# Patient Record
Sex: MALE | Race: WHITE | Employment: OTHER | URBAN - METROPOLITAN AREA
[De-identification: names, ages, dates, MRNs, and addresses within clinical notes are randomized per-mention and may not be internally consistent; named-entity substitution may affect disease eponyms.]

---

## 2017-01-17 ENCOUNTER — OFFICE VISIT (OUTPATIENT)
Dept: INTERNAL MEDICINE CLINIC | Facility: CLINIC | Age: 62
End: 2017-01-17

## 2017-01-17 VITALS
DIASTOLIC BLOOD PRESSURE: 103 MMHG | SYSTOLIC BLOOD PRESSURE: 173 MMHG | WEIGHT: 315 LBS | HEIGHT: 69 IN | TEMPERATURE: 98 F | HEART RATE: 99 BPM | BODY MASS INDEX: 46.65 KG/M2

## 2017-01-17 DIAGNOSIS — IMO0001 COLD: Primary | ICD-10-CM

## 2017-01-17 DIAGNOSIS — E66.01 MORBID OBESITY, UNSPECIFIED OBESITY TYPE (HCC): ICD-10-CM

## 2017-01-17 DIAGNOSIS — I10 ESSENTIAL HYPERTENSION: ICD-10-CM

## 2017-01-17 PROCEDURE — 99213 OFFICE O/P EST LOW 20 MIN: CPT | Performed by: INTERNAL MEDICINE

## 2017-01-17 PROCEDURE — 99212 OFFICE O/P EST SF 10 MIN: CPT | Performed by: INTERNAL MEDICINE

## 2017-01-17 RX ORDER — METOPROLOL SUCCINATE 25 MG/1
25 TABLET, EXTENDED RELEASE ORAL DAILY
Qty: 90 TABLET | Refills: 2 | Status: SHIPPED | OUTPATIENT
Start: 2017-01-17 | End: 2017-10-17

## 2017-01-17 RX ORDER — PHENTERMINE HYDROCHLORIDE 15 MG/1
15 CAPSULE ORAL EVERY MORNING
Qty: 30 CAPSULE | Refills: 0 | Status: SHIPPED | OUTPATIENT
Start: 2017-01-17 | End: 2017-03-28

## 2017-01-17 RX ORDER — AMOXICILLIN AND CLAVULANATE POTASSIUM 500; 125 MG/1; MG/1
1 TABLET, FILM COATED ORAL
Qty: 20 TABLET | Refills: 0 | Status: SHIPPED | OUTPATIENT
Start: 2017-01-17 | End: 2017-02-17 | Stop reason: ALTCHOICE

## 2017-01-17 NOTE — PROGRESS NOTES
Has a cold  Dec 4 lbs  Out of metoprolol for 4 weeks    Blood pressure 173/103, pulse 99, temperature 98.3 °F (36.8 °C), temperature source Oral, height 5' 9\" (1.753 m), weight 322 lb (146.058 kg).     HEENT-NC/AT [facility], eom intact, sclerae non icteric, o

## 2017-01-31 ENCOUNTER — TELEPHONE (OUTPATIENT)
Dept: INTERNAL MEDICINE CLINIC | Facility: CLINIC | Age: 62
End: 2017-01-31

## 2017-02-08 ENCOUNTER — TELEPHONE (OUTPATIENT)
Dept: INTERNAL MEDICINE CLINIC | Facility: CLINIC | Age: 62
End: 2017-02-08

## 2017-02-17 ENCOUNTER — OFFICE VISIT (OUTPATIENT)
Dept: INTERNAL MEDICINE CLINIC | Facility: CLINIC | Age: 62
End: 2017-02-17

## 2017-02-17 VITALS
HEART RATE: 88 BPM | SYSTOLIC BLOOD PRESSURE: 133 MMHG | DIASTOLIC BLOOD PRESSURE: 86 MMHG | HEIGHT: 69 IN | TEMPERATURE: 99 F | BODY MASS INDEX: 46.65 KG/M2 | WEIGHT: 315 LBS | RESPIRATION RATE: 22 BRPM

## 2017-02-17 DIAGNOSIS — Z01.818 PRE-OP EVALUATION: ICD-10-CM

## 2017-02-17 DIAGNOSIS — R60.0 LEG EDEMA, RIGHT: ICD-10-CM

## 2017-02-17 DIAGNOSIS — M17.0 PRIMARY OSTEOARTHRITIS OF BOTH KNEES: Primary | ICD-10-CM

## 2017-02-17 DIAGNOSIS — E11.9 WELL CONTROLLED DIABETES MELLITUS (HCC): ICD-10-CM

## 2017-02-17 PROCEDURE — 99212 OFFICE O/P EST SF 10 MIN: CPT | Performed by: INTERNAL MEDICINE

## 2017-02-17 PROCEDURE — 99213 OFFICE O/P EST LOW 20 MIN: CPT | Performed by: INTERNAL MEDICINE

## 2017-02-17 NOTE — H&P
Pre op  The patient is to have r knee surgery 4/10 with dr Jayesh Magana  Past Medical History   Diagnosis Date   • Obesity, unspecified    • Ear problems    • High blood pressure    • High cholesterol    • Diabetes [facility])    • Pulmonary emphysema ([address] MILD   • and sensation  Mental Status-Appropriate  r leg 1+ edema  1. Primary osteoarthritis of both knees      2. Pre-op evaluation    - CBC With Diff; Future  - CMP; Future  - TSH,  Reflex Free T4; Future  - UA at Lab;  Future  - UA dipstick (POC)  - PTT, Activate

## 2017-02-22 ENCOUNTER — HOSPITAL ENCOUNTER (OUTPATIENT)
Dept: ULTRASOUND IMAGING | Facility: HOSPITAL | Age: 62
Discharge: HOME | End: 2017-02-22
Attending: INTERNAL MEDICINE
Payer: COMMERCIAL

## 2017-02-22 DIAGNOSIS — R60.0 LEG EDEMA, RIGHT: ICD-10-CM

## 2017-02-22 PROCEDURE — 93971 EXTREMITY STUDY: CPT

## 2017-03-09 ENCOUNTER — HOSPITAL ENCOUNTER (OUTPATIENT)
Dept: CV DIAGNOSTICS | Facility: HOSPITAL | Age: 62
Discharge: HOME | End: 2017-03-09
Attending: INTERNAL MEDICINE
Payer: COMMERCIAL

## 2017-03-09 ENCOUNTER — HOSPITAL ENCOUNTER (OUTPATIENT)
Dept: GENERAL RADIOLOGY | Facility: HOSPITAL | Age: 62
Discharge: HOME | End: 2017-03-09
Attending: INTERNAL MEDICINE
Payer: COMMERCIAL

## 2017-03-09 DIAGNOSIS — Z01.818 PRE-OP EVALUATION: ICD-10-CM

## 2017-03-09 DIAGNOSIS — E11.9 WELL CONTROLLED DIABETES MELLITUS (HCC): ICD-10-CM

## 2017-03-09 LAB
ALBUMIN SERPL BCP-MCNC: 3.7 G/DL (ref 3.5–4.8)
ALBUMIN/GLOB SERPL: 0.9 {RATIO} (ref 1–2)
ALP SERPL-CCNC: 78 U/L (ref 32–100)
ALT SERPL-CCNC: 44 U/L (ref 17–63)
ANION GAP SERPL CALC-SCNC: 10 MMOL/L (ref 0–18)
APTT PPP: 31 SECONDS (ref 23.2–35.3)
AST SERPL-CCNC: 27 U/L (ref 15–41)
BASOPHILS # BLD: 0 K/UL (ref 0–0.2)
BASOPHILS NFR BLD: 0 %
BILIRUB SERPL-MCNC: 0.7 MG/DL (ref 0.3–1.2)
BILIRUB UR QL: NEGATIVE
BUN SERPL-MCNC: 20 MG/DL (ref 8–20)
BUN/CREAT SERPL: 15.9 (ref 10–20)
CALCIUM SERPL-MCNC: 9.3 MG/DL (ref 8.5–10.5)
CHLORIDE SERPL-SCNC: 106 MMOL/L (ref 95–110)
CHOLEST SERPL-MCNC: 162 MG/DL (ref 110–200)
CO2 SERPL-SCNC: 23 MMOL/L (ref 22–32)
COLOR UR: YELLOW
CREAT SERPL-MCNC: 1.26 MG/DL (ref 0.5–1.5)
EOSINOPHIL # BLD: 0.2 K/UL (ref 0–0.7)
EOSINOPHIL NFR BLD: 3 %
ERYTHROCYTE [DISTWIDTH] IN BLOOD BY AUTOMATED COUNT: 13 % (ref 11–15)
GLOBULIN PLAS-MCNC: 3.9 G/DL (ref 2.5–3.7)
GLUCOSE SERPL-MCNC: 130 MG/DL (ref 70–99)
GLUCOSE UR-MCNC: NEGATIVE MG/DL
HCT VFR BLD AUTO: 45.1 % (ref 41–52)
HDLC SERPL-MCNC: 36 MG/DL
HGB BLD-MCNC: 15.3 G/DL (ref 13.5–17.5)
HGB UR QL STRIP.AUTO: NEGATIVE
HYALINE CASTS #/AREA URNS AUTO: 3 /LPF
INR BLD: 1 (ref 0.9–1.2)
LDLC SERPL CALC-MCNC: 98 MG/DL (ref 0–99)
LEUKOCYTE ESTERASE UR QL STRIP.AUTO: NEGATIVE
LYMPHOCYTES # BLD: 2.5 K/UL (ref 1–4)
LYMPHOCYTES NFR BLD: 33 %
MCH RBC QN AUTO: 30.9 PG (ref 27–32)
MCHC RBC AUTO-ENTMCNC: 34 G/DL (ref 32–37)
MCV RBC AUTO: 90.9 FL (ref 80–100)
MONOCYTES # BLD: 0.6 K/UL (ref 0–1)
MONOCYTES NFR BLD: 8 %
NEUTROPHILS # BLD AUTO: 4.3 K/UL (ref 1.8–7.7)
NEUTROPHILS NFR BLD: 56 %
NITRITE UR QL STRIP.AUTO: NEGATIVE
NONHDLC SERPL-MCNC: 126 MG/DL
OSMOLALITY UR CALC.SUM OF ELEC: 292 MOSM/KG (ref 275–295)
PH UR: 5 [PH] (ref 5–8)
PLATELET # BLD AUTO: 283 K/UL (ref 140–400)
PMV BLD AUTO: 8.4 FL (ref 7.4–10.3)
POTASSIUM SERPL-SCNC: 4.5 MMOL/L (ref 3.3–5.1)
PROT SERPL-MCNC: 7.6 G/DL (ref 5.9–8.4)
PROT UR-MCNC: 30 MG/DL
PROTHROMBIN TIME: 13.2 SECONDS (ref 11.8–14.5)
RBC # BLD AUTO: 4.96 M/UL (ref 4.5–5.9)
RBC #/AREA URNS AUTO: 1 /HPF
SODIUM SERPL-SCNC: 139 MMOL/L (ref 136–144)
SP GR UR STRIP: 1.03 (ref 1–1.03)
TRIGL SERPL-MCNC: 142 MG/DL (ref 1–149)
TSH SERPL-ACNC: 3.28 UIU/ML (ref 0.34–5.6)
UROBILINOGEN UR STRIP-ACNC: <2
VIT C UR-MCNC: NEGATIVE MG/DL
WBC # BLD AUTO: 7.6 K/UL (ref 4–11)
WBC #/AREA URNS AUTO: 2 /HPF

## 2017-03-09 PROCEDURE — 93010 ELECTROCARDIOGRAM REPORT: CPT

## 2017-03-09 PROCEDURE — 85610 PROTHROMBIN TIME: CPT | Performed by: INTERNAL MEDICINE

## 2017-03-09 PROCEDURE — 84443 ASSAY THYROID STIM HORMONE: CPT

## 2017-03-09 PROCEDURE — 80053 COMPREHEN METABOLIC PANEL: CPT

## 2017-03-09 PROCEDURE — 87086 URINE CULTURE/COLONY COUNT: CPT | Performed by: INTERNAL MEDICINE

## 2017-03-09 PROCEDURE — 80061 LIPID PANEL: CPT

## 2017-03-09 PROCEDURE — 36415 COLL VENOUS BLD VENIPUNCTURE: CPT | Performed by: INTERNAL MEDICINE

## 2017-03-09 PROCEDURE — 93005 ELECTROCARDIOGRAM TRACING: CPT

## 2017-03-09 PROCEDURE — 81001 URINALYSIS AUTO W/SCOPE: CPT

## 2017-03-09 PROCEDURE — 85025 COMPLETE CBC W/AUTO DIFF WBC: CPT

## 2017-03-09 PROCEDURE — 85730 THROMBOPLASTIN TIME PARTIAL: CPT

## 2017-03-09 PROCEDURE — 71020 XR CHEST PA + LAT CHEST (CPT=71020): CPT

## 2017-03-28 ENCOUNTER — OFFICE VISIT (OUTPATIENT)
Dept: INTERNAL MEDICINE CLINIC | Facility: CLINIC | Age: 62
End: 2017-03-28

## 2017-03-28 VITALS
BODY MASS INDEX: 46.65 KG/M2 | SYSTOLIC BLOOD PRESSURE: 150 MMHG | HEART RATE: 76 BPM | DIASTOLIC BLOOD PRESSURE: 93 MMHG | WEIGHT: 315 LBS | RESPIRATION RATE: 18 BRPM | HEIGHT: 69 IN

## 2017-03-28 DIAGNOSIS — E66.01 MORBID OBESITY, UNSPECIFIED OBESITY TYPE (HCC): ICD-10-CM

## 2017-03-28 DIAGNOSIS — M17.0 PRIMARY OSTEOARTHRITIS OF BOTH KNEES: Primary | ICD-10-CM

## 2017-03-28 DIAGNOSIS — I10 ESSENTIAL HYPERTENSION: ICD-10-CM

## 2017-03-28 DIAGNOSIS — Z01.818 PRE-OP EVALUATION: ICD-10-CM

## 2017-03-28 PROCEDURE — 99212 OFFICE O/P EST SF 10 MIN: CPT | Performed by: INTERNAL MEDICINE

## 2017-03-28 PROCEDURE — 99215 OFFICE O/P EST HI 40 MIN: CPT | Performed by: INTERNAL MEDICINE

## 2017-03-28 NOTE — H&P
Pre op for r knee tkr with Dr Laurence Read  Patient has failed non operative care for end stage DJD He is in constant pain  Past Medical History   Diagnosis Date   • Obesity, unspecified    • Ear problems    • High blood pressure    • High cholesterol    • Cici ONETOUCH LANCETS Does not apply Misc, Test twice daily prn, Disp: 100 each, Rfl: 3  •  ibuprofen 600 MG Oral Tab, Take 1 tablet (600 mg total) by mouth every 8 (eight) hours as needed for Pain., Disp: 30 tablet, Rfl: 0    Lamisil [Terbinafin*    Unknown

## 2017-05-02 ENCOUNTER — OFFICE VISIT (OUTPATIENT)
Dept: PHYSICAL THERAPY | Facility: HOSPITAL | Age: 62
End: 2017-05-02
Attending: ORTHOPAEDIC SURGERY
Payer: COMMERCIAL

## 2017-05-02 NOTE — PROGRESS NOTES
Pre-Op Evaluation    Date of surgery: 6/12/17   Procedure: R TKA  Physician: Dr. 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  Magalys

## 2017-05-03 NOTE — DISCHARGE PLANNING
5/3CM-Pre Admission Screening: This Writer met with Patient  for a preadmission screening.  The Patient resides with his wife in [place] in a ranch home with 3 steps to enter.   The Patient has been  driving, doing grocery shopping, errands, and has been

## 2017-05-05 RX ORDER — BLOOD SUGAR DIAGNOSTIC
STRIP MISCELLANEOUS
Qty: 100 STRIP | Refills: 2 | Status: SHIPPED | OUTPATIENT
Start: 2017-05-05 | End: 2017-08-04

## 2017-05-19 RX ORDER — PITAVASTATIN CALCIUM 4.18 MG/1
TABLET, FILM COATED ORAL
Qty: 90 TABLET | Refills: 0 | Status: SHIPPED | OUTPATIENT
Start: 2017-05-19 | End: 2017-12-04

## 2017-06-03 ENCOUNTER — LAB ENCOUNTER (OUTPATIENT)
Dept: LAB | Facility: HOSPITAL | Age: 62
End: 2017-06-03
Attending: ORTHOPAEDIC SURGERY
Payer: COMMERCIAL

## 2017-06-03 DIAGNOSIS — M17.0 PRIMARY OSTEOARTHRITIS OF BOTH KNEES: ICD-10-CM

## 2017-06-03 DIAGNOSIS — Z01.818 PRE-OP EVALUATION: ICD-10-CM

## 2017-06-03 DIAGNOSIS — E11.9 TYPE II DIABETES MELLITUS (HCC): ICD-10-CM

## 2017-06-03 PROCEDURE — 80048 BASIC METABOLIC PNL TOTAL CA: CPT

## 2017-06-03 PROCEDURE — 36415 COLL VENOUS BLD VENIPUNCTURE: CPT

## 2017-06-03 PROCEDURE — 87641 MR-STAPH DNA AMP PROBE: CPT

## 2017-06-03 PROCEDURE — 86901 BLOOD TYPING SEROLOGIC RH(D): CPT

## 2017-06-03 PROCEDURE — 86850 RBC ANTIBODY SCREEN: CPT

## 2017-06-03 PROCEDURE — 83036 HEMOGLOBIN GLYCOSYLATED A1C: CPT

## 2017-06-03 PROCEDURE — 86900 BLOOD TYPING SEROLOGIC ABO: CPT

## 2017-06-05 ENCOUNTER — OFFICE VISIT (OUTPATIENT)
Dept: OPTOMETRY | Facility: CLINIC | Age: 62
End: 2017-06-05

## 2017-06-05 DIAGNOSIS — E11.9 TYPE 2 DIABETES MELLITUS WITHOUT COMPLICATION, WITHOUT LONG-TERM CURRENT USE OF INSULIN (HCC): Primary | ICD-10-CM

## 2017-06-05 DIAGNOSIS — H25.13 AGE-RELATED NUCLEAR CATARACT OF BOTH EYES: ICD-10-CM

## 2017-06-05 PROCEDURE — 92014 COMPRE OPH EXAM EST PT 1/>: CPT | Performed by: OPTOMETRIST

## 2017-06-05 NOTE — PROGRESS NOTES
Sammi Hoff is a 65year old male. HPI:     HPI     Diabetic Eye Exam   Diabetes characteristics include Type 2, controlled with diet and taking oral medications. Duration of 7 years.            Comments   Patient is in for an annual diabetic e hydrocodone-acetaminophen (NORCO) 7.5-325 MG Oral Tab Take 1 tablet by mouth every 4 (four) hours as needed for Pain.  Disp: 60 tablet Rfl: 0   ONETOUCH LANCETS Does not apply Misc Test twice daily prn Disp: 100 each Rfl: 3   ibuprofen 600 MG Oral Tab [facility] Normal no BDR Normal no BDR    Vessels Normal Normal    Periphery Normal Normal            Refraction     Wearing Rx     Type:  Forgot glasses      Manifest Refraction      Sphere Cylinder Axis Dist Add   Right -0.25 -0.75 70 20/25 +2.25   Left [place] -0.75

## 2017-06-05 NOTE — PATIENT INSTRUCTIONS
Astigmatism with presbyopia  Gave copy of current RX from last year he never filled.     Type II diabetes mellitus ([facility]  I advised patient that there is no background diabetic retinopathy in either eye and that they should continue to keep their blood suga

## 2017-06-09 NOTE — H&P
ORTHO SURGERY H&P  Dhaval Manzo is a 65year old male. MRN is [id number].  Admitted: (Not on file)    CC: Right knee pain    HPI: Mr. Santosh Sprague is a 65year old male with a known history of right knee osteoarthritis who presents complaining of ri tablets, one tablet PO BID  Metoprolol Succinate- 25 mg tablets, one tablet PO daily  Norco- 7.5/325 mg tablets, one tablet PO Q4hrs PRN pain                 Review Of Systems:     Musculoskeletal: See HPI. All other systems reviewed and negative.  Denies noted.    Assessment/Plan:    Mr. Sandra Cordova was seen seen and evaluated by Dr. Woody Cheung. He has ongoing right knee pain after an arthroscopic debridement and partial meniscectomy. He has x-rays consistent with moderate degenerative change.  His hip appea

## 2017-06-12 ENCOUNTER — HOSPITAL ENCOUNTER (INPATIENT)
Facility: HOSPITAL | Age: 62
LOS: 2 days | Discharge: HOME HEALTH | DRG: 470 | End: 2017-06-14
Attending: ORTHOPAEDIC SURGERY | Admitting: ORTHOPAEDIC SURGERY
Payer: COMMERCIAL

## 2017-06-12 ENCOUNTER — ANESTHESIA (OUTPATIENT)
Dept: SURGERY | Facility: HOSPITAL | Age: 62
DRG: 470 | End: 2017-06-12
Payer: COMMERCIAL

## 2017-06-12 ENCOUNTER — ANESTHESIA EVENT (OUTPATIENT)
Dept: SURGERY | Facility: HOSPITAL | Age: 62
DRG: 470 | End: 2017-06-12
Payer: COMMERCIAL

## 2017-06-12 ENCOUNTER — SURGERY (OUTPATIENT)
Age: 62
End: 2017-06-12

## 2017-06-12 ENCOUNTER — APPOINTMENT (OUTPATIENT)
Dept: GENERAL RADIOLOGY | Facility: HOSPITAL | Age: 62
DRG: 470 | End: 2017-06-12
Attending: ORTHOPAEDIC SURGERY
Payer: COMMERCIAL

## 2017-06-12 DIAGNOSIS — M17.11 PRIMARY OSTEOARTHRITIS OF RIGHT KNEE: ICD-10-CM

## 2017-06-12 DIAGNOSIS — M17.0 PRIMARY OSTEOARTHRITIS OF BOTH KNEES: ICD-10-CM

## 2017-06-12 DIAGNOSIS — E11.9 TYPE II DIABETES MELLITUS (HCC): ICD-10-CM

## 2017-06-12 DIAGNOSIS — Z01.818 PRE-OP EVALUATION: Primary | ICD-10-CM

## 2017-06-12 PROCEDURE — 73560 X-RAY EXAM OF KNEE 1 OR 2: CPT | Performed by: ORTHOPAEDIC SURGERY

## 2017-06-12 PROCEDURE — 99233 SBSQ HOSP IP/OBS HIGH 50: CPT | Performed by: HOSPITALIST

## 2017-06-12 PROCEDURE — 0SRC0J9 REPLACEMENT OF RIGHT KNEE JOINT WITH SYNTHETIC SUBSTITUTE, CEMENTED, OPEN APPROACH: ICD-10-PCS | Performed by: ORTHOPAEDIC SURGERY

## 2017-06-12 DEVICE — CEMENT BONE ZIM PALICOS R +G: Type: IMPLANTABLE DEVICE | Site: KNEE | Status: FUNCTIONAL

## 2017-06-12 DEVICE — ALL POLY PAT VE 38MM: Type: IMPLANTABLE DEVICE | Site: KNEE | Status: FUNCTIONAL

## 2017-06-12 DEVICE — PSN TIB STM 5 DEG SZ H R: Type: IMPLANTABLE DEVICE | Site: KNEE | Status: FUNCTIONAL

## 2017-06-12 DEVICE — PSN FEM CR CMT CCR STD SZ11 R: Type: IMPLANTABLE DEVICE | Site: KNEE | Status: FUNCTIONAL

## 2017-06-12 RX ORDER — DIPHENHYDRAMINE HYDROCHLORIDE 50 MG/ML
25 INJECTION INTRAMUSCULAR; INTRAVENOUS ONCE AS NEEDED
Status: DISPENSED | OUTPATIENT
Start: 2017-06-12 | End: 2017-06-12

## 2017-06-12 RX ORDER — METOCLOPRAMIDE HYDROCHLORIDE 5 MG/ML
10 INJECTION INTRAMUSCULAR; INTRAVENOUS EVERY 6 HOURS PRN
Status: ACTIVE | OUTPATIENT
Start: 2017-06-12 | End: 2017-06-14

## 2017-06-12 RX ORDER — HYDROCODONE BITARTRATE AND ACETAMINOPHEN 7.5; 325 MG/1; MG/1
1 TABLET ORAL EVERY 6 HOURS PRN
Status: DISPENSED | OUTPATIENT
Start: 2017-06-12 | End: 2017-06-13

## 2017-06-12 RX ORDER — SENNOSIDES 8.6 MG
17.2 TABLET ORAL NIGHTLY
Status: DISCONTINUED | OUTPATIENT
Start: 2017-06-12 | End: 2017-06-14

## 2017-06-12 RX ORDER — SODIUM CHLORIDE, SODIUM LACTATE, POTASSIUM CHLORIDE, CALCIUM CHLORIDE 600; 310; 30; 20 MG/100ML; MG/100ML; MG/100ML; MG/100ML
INJECTION, SOLUTION INTRAVENOUS CONTINUOUS PRN
Status: DISCONTINUED | OUTPATIENT
Start: 2017-06-12 | End: 2017-06-12 | Stop reason: SURG

## 2017-06-12 RX ORDER — DEXTROSE MONOHYDRATE 25 G/50ML
50 INJECTION, SOLUTION INTRAVENOUS AS NEEDED
Status: DISCONTINUED | OUTPATIENT
Start: 2017-06-12 | End: 2017-06-14

## 2017-06-12 RX ORDER — ATORVASTATIN CALCIUM 20 MG/1
20 TABLET, FILM COATED ORAL NIGHTLY
Status: DISCONTINUED | OUTPATIENT
Start: 2017-06-12 | End: 2017-06-14

## 2017-06-12 RX ORDER — HYDROCODONE BITARTRATE AND ACETAMINOPHEN 7.5; 325 MG/1; MG/1
1 TABLET ORAL EVERY 4 HOURS PRN
Status: DISCONTINUED | OUTPATIENT
Start: 2017-06-12 | End: 2017-06-14

## 2017-06-12 RX ORDER — MORPHINE SULFATE 4 MG/ML
4 INJECTION, SOLUTION INTRAMUSCULAR; INTRAVENOUS EVERY 2 HOUR PRN
Status: DISCONTINUED | OUTPATIENT
Start: 2017-06-12 | End: 2017-06-14

## 2017-06-12 RX ORDER — SODIUM CHLORIDE, SODIUM LACTATE, POTASSIUM CHLORIDE, CALCIUM CHLORIDE 600; 310; 30; 20 MG/100ML; MG/100ML; MG/100ML; MG/100ML
INJECTION, SOLUTION INTRAVENOUS CONTINUOUS
Status: DISCONTINUED | OUTPATIENT
Start: 2017-06-12 | End: 2017-06-14

## 2017-06-12 RX ORDER — SODIUM CHLORIDE 0.9 % (FLUSH) 0.9 %
10 SYRINGE (ML) INJECTION AS NEEDED
Status: DISCONTINUED | OUTPATIENT
Start: 2017-06-12 | End: 2017-06-14

## 2017-06-12 RX ORDER — NALBUPHINE HCL 10 MG/ML
2.5 AMPUL (ML) INJECTION EVERY 4 HOURS PRN
Status: ACTIVE | OUTPATIENT
Start: 2017-06-12 | End: 2017-06-13

## 2017-06-12 RX ORDER — METOCLOPRAMIDE 10 MG/1
10 TABLET ORAL ONCE
Status: COMPLETED | OUTPATIENT
Start: 2017-06-12 | End: 2017-06-12

## 2017-06-12 RX ORDER — MELATONIN
325
Status: DISCONTINUED | OUTPATIENT
Start: 2017-06-12 | End: 2017-06-14

## 2017-06-12 RX ORDER — ACETAMINOPHEN 325 MG/1
650 TABLET ORAL ONCE
Status: COMPLETED | OUTPATIENT
Start: 2017-06-12 | End: 2017-06-12

## 2017-06-12 RX ORDER — FAMOTIDINE 20 MG/1
20 TABLET ORAL 2 TIMES DAILY
Status: DISCONTINUED | OUTPATIENT
Start: 2017-06-12 | End: 2017-06-14

## 2017-06-12 RX ORDER — BISACODYL 10 MG
10 SUPPOSITORY, RECTAL RECTAL
Status: DISCONTINUED | OUTPATIENT
Start: 2017-06-12 | End: 2017-06-14

## 2017-06-12 RX ORDER — BUPIVACAINE HYDROCHLORIDE 7.5 MG/ML
INJECTION, SOLUTION INTRASPINAL AS NEEDED
Status: DISCONTINUED | OUTPATIENT
Start: 2017-06-12 | End: 2017-06-12 | Stop reason: SURG

## 2017-06-12 RX ORDER — OXYCODONE HCL 10 MG/1
10 TABLET, FILM COATED, EXTENDED RELEASE ORAL ONCE
Status: COMPLETED | OUTPATIENT
Start: 2017-06-12 | End: 2017-06-12

## 2017-06-12 RX ORDER — GABAPENTIN 100 MG/1
100 CAPSULE ORAL 2 TIMES DAILY
Status: DISCONTINUED | OUTPATIENT
Start: 2017-06-12 | End: 2017-06-14

## 2017-06-12 RX ORDER — HYDROCODONE BITARTRATE AND ACETAMINOPHEN 7.5; 325 MG/1; MG/1
2 TABLET ORAL EVERY 6 HOURS PRN
Status: DISPENSED | OUTPATIENT
Start: 2017-06-12 | End: 2017-06-13

## 2017-06-12 RX ORDER — POLYETHYLENE GLYCOL 3350 17 G/17G
17 POWDER, FOR SOLUTION ORAL DAILY PRN
Status: DISCONTINUED | OUTPATIENT
Start: 2017-06-12 | End: 2017-06-14

## 2017-06-12 RX ORDER — DIPHENHYDRAMINE HYDROCHLORIDE 50 MG/ML
12.5 INJECTION INTRAMUSCULAR; INTRAVENOUS EVERY 4 HOURS PRN
Status: DISPENSED | OUTPATIENT
Start: 2017-06-12 | End: 2017-06-13

## 2017-06-12 RX ORDER — DIPHENHYDRAMINE HYDROCHLORIDE 50 MG/ML
12.5 INJECTION INTRAMUSCULAR; INTRAVENOUS EVERY 4 HOURS PRN
Status: DISCONTINUED | OUTPATIENT
Start: 2017-06-12 | End: 2017-06-14

## 2017-06-12 RX ORDER — ONDANSETRON 2 MG/ML
4 INJECTION INTRAMUSCULAR; INTRAVENOUS ONCE AS NEEDED
Status: ACTIVE | OUTPATIENT
Start: 2017-06-12 | End: 2017-06-12

## 2017-06-12 RX ORDER — GABAPENTIN 100 MG/1
100 CAPSULE ORAL ONCE
Status: COMPLETED | OUTPATIENT
Start: 2017-06-12 | End: 2017-06-12

## 2017-06-12 RX ORDER — ACETAMINOPHEN 325 MG/1
650 TABLET ORAL EVERY 6 HOURS PRN
Status: ACTIVE | OUTPATIENT
Start: 2017-06-12 | End: 2017-06-13

## 2017-06-12 RX ORDER — MORPHINE SULFATE 2 MG/ML
2 INJECTION, SOLUTION INTRAMUSCULAR; INTRAVENOUS EVERY 2 HOUR PRN
Status: DISCONTINUED | OUTPATIENT
Start: 2017-06-12 | End: 2017-06-14

## 2017-06-12 RX ORDER — FAMOTIDINE 10 MG/ML
20 INJECTION, SOLUTION INTRAVENOUS 2 TIMES DAILY
Status: DISCONTINUED | OUTPATIENT
Start: 2017-06-12 | End: 2017-06-14

## 2017-06-12 RX ORDER — HALOPERIDOL 5 MG/ML
0.5 INJECTION INTRAMUSCULAR ONCE AS NEEDED
Status: ACTIVE | OUTPATIENT
Start: 2017-06-12 | End: 2017-06-12

## 2017-06-12 RX ORDER — CELECOXIB 200 MG/1
400 CAPSULE ORAL DAILY
Status: DISCONTINUED | OUTPATIENT
Start: 2017-06-12 | End: 2017-06-14

## 2017-06-12 RX ORDER — FAMOTIDINE 20 MG/1
20 TABLET ORAL ONCE
Status: COMPLETED | OUTPATIENT
Start: 2017-06-12 | End: 2017-06-12

## 2017-06-12 RX ORDER — METOPROLOL SUCCINATE 25 MG/1
25 TABLET, EXTENDED RELEASE ORAL DAILY
Status: DISCONTINUED | OUTPATIENT
Start: 2017-06-12 | End: 2017-06-14

## 2017-06-12 RX ORDER — MIDAZOLAM HYDROCHLORIDE 1 MG/ML
INJECTION INTRAMUSCULAR; INTRAVENOUS AS NEEDED
Status: DISCONTINUED | OUTPATIENT
Start: 2017-06-12 | End: 2017-06-12 | Stop reason: SURG

## 2017-06-12 RX ORDER — HYDROMORPHONE HYDROCHLORIDE 1 MG/ML
0.6 INJECTION, SOLUTION INTRAMUSCULAR; INTRAVENOUS; SUBCUTANEOUS
Status: ACTIVE | OUTPATIENT
Start: 2017-06-12 | End: 2017-06-13

## 2017-06-12 RX ORDER — ONDANSETRON 2 MG/ML
4 INJECTION INTRAMUSCULAR; INTRAVENOUS EVERY 4 HOURS PRN
Status: DISCONTINUED | OUTPATIENT
Start: 2017-06-12 | End: 2017-06-14

## 2017-06-12 RX ORDER — LIDOCAINE HYDROCHLORIDE 10 MG/ML
INJECTION, SOLUTION EPIDURAL; INFILTRATION; INTRACAUDAL; PERINEURAL AS NEEDED
Status: DISCONTINUED | OUTPATIENT
Start: 2017-06-12 | End: 2017-06-12 | Stop reason: SURG

## 2017-06-12 RX ORDER — DIPHENHYDRAMINE HCL 25 MG
25 CAPSULE ORAL EVERY 4 HOURS PRN
Status: ACTIVE | OUTPATIENT
Start: 2017-06-12 | End: 2017-06-13

## 2017-06-12 RX ORDER — DEXTROSE, SODIUM CHLORIDE, AND POTASSIUM CHLORIDE 5; .45; .15 G/100ML; G/100ML; G/100ML
INJECTION INTRAVENOUS CONTINUOUS
Status: DISCONTINUED | OUTPATIENT
Start: 2017-06-12 | End: 2017-06-14

## 2017-06-12 RX ORDER — HYDROMORPHONE HYDROCHLORIDE 1 MG/ML
0.4 INJECTION, SOLUTION INTRAMUSCULAR; INTRAVENOUS; SUBCUTANEOUS
Status: ACTIVE | OUTPATIENT
Start: 2017-06-12 | End: 2017-06-13

## 2017-06-12 RX ORDER — NALOXONE HYDROCHLORIDE 0.4 MG/ML
0.08 INJECTION, SOLUTION INTRAMUSCULAR; INTRAVENOUS; SUBCUTANEOUS
Status: ACTIVE | OUTPATIENT
Start: 2017-06-12 | End: 2017-06-13

## 2017-06-12 RX ORDER — DIPHENHYDRAMINE HCL 25 MG
25 CAPSULE ORAL EVERY 4 HOURS PRN
Status: DISCONTINUED | OUTPATIENT
Start: 2017-06-12 | End: 2017-06-14

## 2017-06-12 RX ORDER — OXYCODONE HCL 10 MG/1
10 TABLET, FILM COATED, EXTENDED RELEASE ORAL EVERY 12 HOURS
Status: DISCONTINUED | OUTPATIENT
Start: 2017-06-12 | End: 2017-06-14

## 2017-06-12 RX ORDER — MORPHINE SULFATE 2 MG/ML
1 INJECTION, SOLUTION INTRAMUSCULAR; INTRAVENOUS EVERY 2 HOUR PRN
Status: DISCONTINUED | OUTPATIENT
Start: 2017-06-12 | End: 2017-06-14

## 2017-06-12 RX ORDER — DOCUSATE SODIUM 100 MG/1
100 CAPSULE, LIQUID FILLED ORAL 2 TIMES DAILY
Status: DISCONTINUED | OUTPATIENT
Start: 2017-06-12 | End: 2017-06-14

## 2017-06-12 RX ADMIN — LIDOCAINE HYDROCHLORIDE 25 MG: 10 INJECTION, SOLUTION EPIDURAL; INFILTRATION; INTRACAUDAL; PERINEURAL at 07:46:00

## 2017-06-12 RX ADMIN — SODIUM CHLORIDE, SODIUM LACTATE, POTASSIUM CHLORIDE, CALCIUM CHLORIDE: 600; 310; 30; 20 INJECTION, SOLUTION INTRAVENOUS at 09:46:00

## 2017-06-12 RX ADMIN — SODIUM CHLORIDE, SODIUM LACTATE, POTASSIUM CHLORIDE, CALCIUM CHLORIDE: 600; 310; 30; 20 INJECTION, SOLUTION INTRAVENOUS at 07:50:00

## 2017-06-12 RX ADMIN — MIDAZOLAM HYDROCHLORIDE 2 MG: 1 INJECTION INTRAMUSCULAR; INTRAVENOUS at 07:30:00

## 2017-06-12 RX ADMIN — BUPIVACAINE HYDROCHLORIDE 1.5 ML: 7.5 INJECTION, SOLUTION INTRASPINAL at 07:39:00

## 2017-06-12 RX ADMIN — LIDOCAINE HYDROCHLORIDE 30 MG: 10 INJECTION, SOLUTION EPIDURAL; INFILTRATION; INTRACAUDAL; PERINEURAL at 07:35:00

## 2017-06-12 NOTE — OPERATIVE REPORT
Operative Note    Patient Name: Carson Chaidez    Preoperative Diagnosis: right knee osteoarthritis primary    Postoperative Diagnosis: right knee osteoarthritis primary    Primary Surgeon: Mari Apple MD     Assistant: Nadeem Ascenc[address]

## 2017-06-12 NOTE — ANESTHESIA PROCEDURE NOTES
Spinal Block  Performed by: Taylor March Authorized by: Taylor March Start Time:  6/12/2017 7:33 AM  End Time:  6/12/2017 7:42 AM  Reason for Block: surgical anesthesia    Anesthesiologist:  Berenice Tubbs  CRNA:  Taylor March

## 2017-06-12 NOTE — ANESTHESIA POSTPROCEDURE EVALUATION
Patient: Kalin Lab    Procedure Summary     Date Anesthesia Start Anesthesia Stop Room / Location    06/12/17 0730 1008 [facility] MAIN OR 12 / 300 [facility] MAIN OR       Procedure Diagnosis Surgeon Responsible Provider    KNEE TOTAL REPLACEMENT (Right Knee) (r

## 2017-06-12 NOTE — DISCHARGE PLANNING
6/12CM-MD orders received in regards to discharge planning- The Patient was seen for a preadmission screening and was agreeable to [facility].  This Writer met with the Patient at bedside and he confirmed his discharge plan was still going home with [facility]

## 2017-06-12 NOTE — ANESTHESIA PREPROCEDURE EVALUATION
Anesthesia PreOp Note    HPI:     Skip Jose is a 65year old male who presents for preoperative consultation requested by: Carleen Myers MD    Date of Surgery: 6/12/2017    Procedure(s):  KNEE TOTAL REPLACEMENT  Indication: right knee osteo as needed for Pain.  Disp: 60 tablet Rfl: 0 6/11/2017 at Unknown time   [facility] LANCETS Does not apply Misc Test twice daily prn Disp: 100 each Rfl: 3 6/11/2017 at Unknown time   ibuprofen 600 MG Oral Tab Take 1 tablet (600 mg total) by mouth every 8 (eigh Yes    Comment: Coffee, 2 cups daily; Social History Narrative       Available pre-op labs reviewed.        Lab Results  Component Value Date    06/03/2017   K 4.3 06/03/2017    06/03/2017   CO2 28 06/03/2017   BUN 13 06/03/2017   SKINNY

## 2017-06-12 NOTE — PROGRESS NOTES
[facility]    Progress Note    Mariela Pena Patient Status:  Inpatient    1955 MRN [id number]   Location [facility] 4W/SW/SE Attending Lyndsey Duckworth MD   Hosp Day # 0 PCP Reyna Olivo MD     Subjective: obesity (HCC)  MONITOR RESPIRATORY AND HEMODYNAMIC STATUS. Essential hypertension  CONT HOME MEDS, MONITOR. Obstructive sleep apnea  NON COMPLIANT WITH CPAP, BRYCE PROTOCOL.          Results:     Lab Results  Component Value Date   WBC 7.6 03/09/20

## 2017-06-12 NOTE — OPERATIVE REPORT
[facility]    PATIENT'S NAME: JESUS SCHULTE   ATTENDING PHYSICIAN: Matteo Ram MD   OPERATING PHYSICIAN: Ricco Hernandez MD   PATIENT ACCOUNT#:   [id number]    LOCATION:  [address] [place] [address] #:   [id number]       DATE OF foot joyce for the [facility] type knee positioner. Next, the extremity was exsanguinated. The tourniquet was inflated to 250 mmHg. A longitudinal incision was then made over the anterior aspect of the knee, approximately 5 inches in length.   A medial para Superior pins were removed and marked. The cutting guide was applied. Additional slope was dialed into the guide and the tibial cut was made. Bone fragment was removed and was sized on the back table. It appeared consistent with the preoperative plan. sponge and instrument counts were reported as correct. The attending physician, Dr. Elio Stinson, was present and performed all critical portions of the procedure. There were no complications. Dictated By Elton Antunez.  Elio Stinson MD  d: 06/12/2017 14:01:21  t:

## 2017-06-12 NOTE — PHYSICAL THERAPY NOTE
PHYSICAL THERAPY KNEE EVALUATION - INPATIENT     Room Number: 268/461-T  Evaluation Date: 6/12/2017  Type of Evaluation: Initial  Physician Order: PT Eval and Treat    Presenting Problem: R TKA  Reason for Therapy: Mobility Dysfunction and Discharge Planni Problems:    Type II diabetes mellitus ([facility]    Morbid obesity [facility]    Essential hypertension      Past Medical History  Past Medical History   Diagnosis Date   • Obesity, unspecified    • Ear problems    • High blood pressure    • High cholesterol    • D the side of the bed?: A Little   How much help from another person does the patient currently need. ..   -   Moving to and from a bed to a chair (including a wheelchair)?: A Little   -   Need to walk in hospital room?: A Little   -   Climbing 3-5 steps with

## 2017-06-12 NOTE — PROGRESS NOTES
06/12/17 1459   Clinical Encounter Type   Visited With Patient   Routine Visit Introduction   Continue Visiting Yes   Surgical Visit Post-op   Patient's Supportive Strategies/Resources Yara   Referral From Nurse   Referral To    [facility]cou

## 2017-06-13 PROCEDURE — 99232 SBSQ HOSP IP/OBS MODERATE 35: CPT | Performed by: HOSPITALIST

## 2017-06-13 NOTE — PROGRESS NOTES
Subjective: No complaints. Pain controlled.     Objective:  Patient Vitals for the past 24 hrs:   BP Temp Temp src Pulse Resp SpO2   06/13/17 0853 140/85 mmHg - - 82 17 -   06/13/17 0423 116/73 mmHg 98.3 °F (36.8 °C) Oral 73 18 94 %   06/13/17 0014 118/84 m

## 2017-06-13 NOTE — PHYSICAL THERAPY NOTE
PHYSICAL THERAPY HIP TREATMENT NOTE - INPATIENT    Room Number: 002/611-H            Presenting Problem: R TKA    Problem List  Principal Problem:    Primary osteoarthritis of right knee  Active Problems:    Type II diabetes mellitus ([address]    Morbid obesit a bed to a chair (including a wheelchair)?: A Little   -   Need to walk in hospital room?: A Little   -   Climbing 3-5 steps with a railing?: A Little    AM-PAC Score:  Raw Score: 18   PT Approx Degree of Impairment Score: 46.58%   Standardized Score (AM-P preparation for discharge.    Goal #6  Current Status IN PROGRESS

## 2017-06-13 NOTE — OCCUPATIONAL THERAPY NOTE
OCCUPATIONAL THERAPY EVALUATION - INPATIENT      Room Number: 593/303-D  Evaluation Date: 6/13/2017  Type of Evaluation: Initial  Presenting Problem:  (R TKA)    Physician Order: IP Consult to Occupational Therapy  Reason for Therapy: ADL/IADL Dysfunction cholesterol    • Diabetes [facility]    • Pulmonary emphysema (HCC) MILD   • Deep vein thrombosis ([facility] 2013   • Sleep apnea      does not tolerate machine   • Osteoarthritis    • Back problem      strain and discomfort       Past Surgical History      Past Leticia ([facility])  -   Bathing (including washing, rinsing, drying)?: A Little ([facility])  -   Toileting, which includes using toilet, bedpan or urinal? : A Little  -   Putting on and taking off regular upper body clothing?: None  -   Taking care of personal grooming such

## 2017-06-13 NOTE — HOME CARE LIAISON
PATIENT IS 65YEAR OLD MALE S/P R TKA. MET WITH PATIENT AT BEDSIDE TO DISCUSS [address] PATIENT IS AGREEABLE TO [address] RN/PT. PATIENT LIVES AT HOME WITH WIFE WHO CAN ASSIST WITH CARE WHEN NEEDED.      HT- 70\" WT- 308 LB

## 2017-06-13 NOTE — PROGRESS NOTES
[facility]    Progress Note    Zechariah Donovan Patient Status:  Inpatient    1955 MRN [id number]   Location [facility] 4W/SW/SE Attending Louis Monroy MD   Hosp Day # 1 PCP Vidhya Kinney MD       Subjective:   Krissy Lr HEMODYNAMIC STATUS.       Essential hypertension  CONT HOME MEDS, MONITOR.      Obstructive sleep apnea  NON COMPLIANT WITH CPAP, BRYCE PROTOCOL.        Ne Alonso MD  06/13/2017

## 2017-06-13 NOTE — PLAN OF CARE
DISCHARGE PLANNING    • Discharge to home or other facility with appropriate resources Progressing    Plan to discharge home with wife and home health tomorrow.        Diabetes/Glucose Control    • Glucose maintained within prescribed range Progressing    G Bed in the locked and lowest position with side rails x2. Call-light and personal belongings within reach. Osmany attempted to get out of bed without supervision. He has been frequently encouraged to use the call-light to ask for assistance being OOB. Wife

## 2017-06-14 VITALS
DIASTOLIC BLOOD PRESSURE: 76 MMHG | WEIGHT: 308.13 LBS | HEIGHT: 70 IN | TEMPERATURE: 98 F | SYSTOLIC BLOOD PRESSURE: 130 MMHG | BODY MASS INDEX: 44.11 KG/M2 | RESPIRATION RATE: 17 BRPM | HEART RATE: 86 BPM | OXYGEN SATURATION: 97 %

## 2017-06-14 PROCEDURE — 99239 HOSP IP/OBS DSCHRG MGMT >30: CPT | Performed by: HOSPITALIST

## 2017-06-14 RX ORDER — CELECOXIB 400 MG/1
400 CAPSULE ORAL DAILY
Qty: 7 CAPSULE | Refills: 0 | Status: SHIPPED | OUTPATIENT
Start: 2017-06-14 | End: 2017-07-26

## 2017-06-14 RX ORDER — OXYCODONE HCL 10 MG/1
10 TABLET, FILM COATED, EXTENDED RELEASE ORAL EVERY 12 HOURS
Qty: 10 TABLET | Refills: 0 | Status: SHIPPED | OUTPATIENT
Start: 2017-06-14 | End: 2017-07-26

## 2017-06-14 RX ORDER — HYDROCODONE BITARTRATE AND ACETAMINOPHEN 7.5; 325 MG/1; MG/1
1 TABLET ORAL EVERY 4 HOURS PRN
Qty: 60 TABLET | Refills: 0 | Status: SHIPPED | OUTPATIENT
Start: 2017-06-14 | End: 2018-06-02

## 2017-06-14 RX ORDER — GABAPENTIN 100 MG/1
100 CAPSULE ORAL 2 TIMES DAILY
Qty: 10 CAPSULE | Refills: 0 | Status: SHIPPED | OUTPATIENT
Start: 2017-06-14 | End: 2017-07-26

## 2017-06-14 NOTE — PHYSICAL THERAPY NOTE
PHYSICAL THERAPY KNEE TREATMENT NOTE - INPATIENT    Room Number: 797/366-O            Presenting Problem: R TKA    Problem List  Principal Problem:    Primary osteoarthritis of right knee  Active Problems:    Type II diabetes mellitus ([facility] [address]    Morbid obesi on the side of the bed?: None   How much help from another person does the patient currently need. ..   -   Moving to and from a bed to a chair (including a wheelchair)?: None   -   Need to walk in hospital room?: None   -   Climbing 3-5 steps with a railin Goal #5   Current Status -5/ 95 deg flex   Goal #6 Patient independently performs home exercise program for ROM/strengthening per the instructions provided in preparation for discharge.    Goal #6  Current Status IN PROGRESS

## 2017-06-14 NOTE — DISCHARGE SUMMARY
[facility]    Discharge Summary    Paula Tipton Patient Status:  Inpatient    1955 MRN [id number]   Location [facility] 4W/SW/SE Attending Aaron Falk MD   Hosp Day # 2 PCP Maddie Ruth MD     Date of Admissi RESPIRATORY AND HEMODYNAMIC STATUS.       Essential hypertension  CONT HOME MEDS, MONITOR.      Obstructive sleep apnea  NON COMPLIANT WITH CPAP, BRYCE PROTOCOL.      Complications: none       Surgical Procedures     Case IDs Date Procedure Surgeon Location S 24 tablet   Refills:  0       celecoxib 400 MG Caps   Last time this was given:  400 mg on 6/14/2017  7:56 AM   Commonly known as:  CELEBREX        Take 1 capsule (400 mg total) by mouth daily.     Quantity:  7 capsule   Refills:  0       gabapentin 100 MG Where to Get Your Medications      Please  your prescriptions at the location directed by your doctor or nurse     Bring a paper prescription for each of these medications    - apixaban 2.5 MG Tabs  - celecoxib 400 MG Caps  - gabapentin 100 MG Cap

## 2017-06-14 NOTE — PROGRESS NOTES
Subjective: No complaints. Pain controlled.     Objective:  Patient Vitals for the past 24 hrs:   BP Temp Temp src Pulse Resp SpO2   06/14/17 0757 130/76 mmHg - - 86 17 -   06/14/17 0428 107/71 mmHg 98.3 °F (36.8 °C) Oral 71 18 95 %   06/14/17 0100 - - - 75

## 2017-06-14 NOTE — OCCUPATIONAL THERAPY NOTE
OCCUPATIONAL THERAPY TREATMENT NOTE - INPATIENT     Room Number: 812/330-U         Presenting Problem:  (R TKA)    Problem List  Principal Problem:    Primary osteoarthritis of right knee  Active Problems:    Type II diabetes mellitus ([facility] [address]    Morbid obesi Score:   Score: 23  Approx Degree of Impairment: 15.86%  Standardized Score (AM-PAC Scale): 51.12  CMS Modifier (G-Code): CI    FUNCTIONAL TRANSFER ASSESSMENT  Supine to Sit : Not tested  Sit to Stand: Supervision    Toilet Transfer: supervision with use of

## 2017-06-14 NOTE — DISCHARGE PLANNING
Plan is for discharge home today 6/14 with [facility]. [facility] is aware of discharge.       Ramya Beyer, [facility] ext [phone]

## 2017-06-14 NOTE — PLAN OF CARE
DISCHARGE PLANNING    • Discharge to home or other facility with appropriate resources Adequate for Discharge        Diabetes/Glucose Control    • Glucose maintained within prescribed range Adequate for Discharge        Impaired Activities of Daily Living plans to be discharged with all belongings including [place] via personal car with friend Teodora Martínez.

## 2017-06-15 ENCOUNTER — TELEPHONE (OUTPATIENT)
Dept: INTERNAL MEDICINE UNIT | Facility: HOSPITAL | Age: 62
End: 2017-06-15

## 2017-06-15 NOTE — TELEPHONE ENCOUNTER
Patient discharged from [facility] on June 14, 2017.  Please call patient to schedule hospital follow-up appointment with NEW PCP, .

## 2017-06-15 NOTE — PAYOR COMM NOTE
REQUESTING 1 ADDITIONAL DAY.    ADMITTED 6/12  D/C'D 6/14  1 DAY APPROVED.   6/13  Objective:  Patient Vitals for the past 24 hrs:    BP  Temp  Temp src  Pulse  Resp  SpO2    06/13/17 0853  140/85 mmHg  -  -  82  17  -    06/13/17 0423  116/73 mmHg  98.3 °F all extremities.   Extremities: No edema.      Results:      Lab Results  Component  Value  Date    [facility] SHEA 11.4*  06/13/2017    HGB  13.5  06/13/2017    HCT  39.0*  06/13/2017    PLT  224  06/13/2017    CREATSERUM  1.03  06/03/2017    BUN  13  06/03/2017    N Risk  29-58 Medium Risk  0-28   Low Risk. Risk of readmission: Oni Gama has Moderate Risk of readmission after discharge from the hospital.  '    Discharge Diagnosis: . Principal Problem:    Primary osteoarthritis of right knee  Active Proble 100 MG Oral Cap  Take 1 capsule (100 mg total) by mouth 2 (two) times daily. Home Meds - Modified    hydrocodone-acetaminophen (NORCO) 7.5-325 MG Oral Tab  Take 1 tablet by mouth every 4 (four) hours as needed for Pain.       Home Meds - Unchanged    L tablet    Refills:  0            CONTINUE taking these medications          Instructions  Prescription details      hydrocodone-acetaminophen 7.5-325 MG Tabs    Last time this was given:  1 tablet on 6/14/2017 12:08 PM    Commonly known as:  Johnnie Card [id number]  [phone]              Follow up with Lj Davis MD.      Specialty:  Internal Medicine      Why:  You have stated that Dr Abby Stevens is retiring ,so you will be choosing a new primary [facility] doctor.  Please call [facility] to assist you

## 2017-07-11 ENCOUNTER — OFFICE VISIT (OUTPATIENT)
Dept: INTERNAL MEDICINE CLINIC | Facility: CLINIC | Age: 62
End: 2017-07-11

## 2017-07-11 VITALS
BODY MASS INDEX: 45.03 KG/M2 | DIASTOLIC BLOOD PRESSURE: 76 MMHG | HEIGHT: 69 IN | WEIGHT: 304 LBS | TEMPERATURE: 98 F | HEART RATE: 78 BPM | SYSTOLIC BLOOD PRESSURE: 120 MMHG

## 2017-07-11 DIAGNOSIS — I10 ESSENTIAL HYPERTENSION: ICD-10-CM

## 2017-07-11 DIAGNOSIS — E78.00 HYPERCHOLESTEROLEMIA: ICD-10-CM

## 2017-07-11 DIAGNOSIS — E11.9 TYPE 2 DIABETES MELLITUS WITHOUT COMPLICATION, WITHOUT LONG-TERM CURRENT USE OF INSULIN (HCC): ICD-10-CM

## 2017-07-11 DIAGNOSIS — M17.0 PRIMARY OSTEOARTHRITIS OF BOTH KNEES: Primary | ICD-10-CM

## 2017-07-11 PROCEDURE — 99212 OFFICE O/P EST SF 10 MIN: CPT | Performed by: INTERNAL MEDICINE

## 2017-07-11 PROCEDURE — 99214 OFFICE O/P EST MOD 30 MIN: CPT | Performed by: INTERNAL MEDICINE

## 2017-07-11 NOTE — PATIENT INSTRUCTIONS
Taking Your Blood Pressure  Blood pressure is the force of blood against the artery wall as it moves from the heart through the blood vessels. You can take your own blood pressure reading using a digital monitor.  Take readings as often as your healthcare · Write down your blood pressure numbers for each reading. Note the date and time. Keep your results in one place, such as a notebook. Even if your monitor has a built-in memory, keep a hard copy of the readings. · Remove the cuff from your arm.  Turn off Avoid: Any other store-bought desserts, or desserts that have fat, whole milk, cream, chocolate, and coconut  Drinks  Ok: Nonfat milk, coffee, tea, fizzy (carbonated) drinks  Avoid: Whole and reduced-fat milk, evaporated and condensed milk, hot chocolate m © 5031-1289 [address], [address] [place] [address]. All rights reserved. This information is not intended as a substitute for professional medical care. Always follow your healthcare professional's instructions.

## 2017-07-11 NOTE — PROGRESS NOTES
Patient ID: Alonso Stephen is a 65year old male. Patient presents with:  Establish Care       HISTORY OF PRESENT ILLNESS:   HPI  Patient presents for above. Patient here to establish care after prior physician retired.   Patient recently underwent •  LIVALO 4 MG Oral Tab, TAKE 1 TABLET BY MOUTH EVERY EVENING, Disp: 90 tablet, Rfl: 0  •  ONETOUCH VERIO In Vitro Strip, TEST TWICE DAILY AS NEEDED, Disp: 100 strip, Rfl: 2  •  METFORMIN  MG Oral Tab, TAKE 1 TABLET (500 MG TOTAL) BY MOUTH 2 (TWO) T Cardiovascular: Normal rate, regular rhythm and normal heart sounds. Pulmonary/Chest: Effort normal and breath sounds normal.   Abdominal: Soft.  Bowel sounds are normal.   Musculoskeletal:        Right knee: He exhibits decreased range of motion and swe

## 2017-07-26 ENCOUNTER — OFFICE VISIT (OUTPATIENT)
Dept: INTERNAL MEDICINE CLINIC | Facility: CLINIC | Age: 62
End: 2017-07-26

## 2017-07-26 VITALS
TEMPERATURE: 98 F | DIASTOLIC BLOOD PRESSURE: 82 MMHG | HEIGHT: 69 IN | BODY MASS INDEX: 44.43 KG/M2 | WEIGHT: 300 LBS | HEART RATE: 90 BPM | SYSTOLIC BLOOD PRESSURE: 135 MMHG

## 2017-07-26 DIAGNOSIS — Z00.00 ANNUAL PHYSICAL EXAM: Primary | ICD-10-CM

## 2017-07-26 DIAGNOSIS — I10 ESSENTIAL HYPERTENSION: ICD-10-CM

## 2017-07-26 DIAGNOSIS — E66.01 OBESITY, CLASS III, BMI 40-49.9 (MORBID OBESITY) (HCC): ICD-10-CM

## 2017-07-26 DIAGNOSIS — E11.9 TYPE 2 DIABETES MELLITUS WITHOUT COMPLICATION, WITHOUT LONG-TERM CURRENT USE OF INSULIN (HCC): ICD-10-CM

## 2017-07-26 DIAGNOSIS — M17.0 PRIMARY OSTEOARTHRITIS OF BOTH KNEES: ICD-10-CM

## 2017-07-26 PROBLEM — Z01.818 PRE-OP EVALUATION: Status: RESOLVED | Noted: 2017-03-28 | Resolved: 2017-07-26

## 2017-07-26 PROCEDURE — 99396 PREV VISIT EST AGE 40-64: CPT | Performed by: INTERNAL MEDICINE

## 2017-07-26 PROCEDURE — 99214 OFFICE O/P EST MOD 30 MIN: CPT | Performed by: INTERNAL MEDICINE

## 2017-07-26 PROCEDURE — 99212 OFFICE O/P EST SF 10 MIN: CPT | Performed by: INTERNAL MEDICINE

## 2017-07-26 NOTE — PROGRESS NOTES
Patient ID: Shashi Bailey is a 65year old male. Patient presents with:  Physical       HISTORY OF PRESENT ILLNESS:   HPI  Patient presents for above. Here for complete physical.  Has not had physical in over 18 months.   Patient with recent statu •  hydrocodone-acetaminophen (NORCO) 7.5-325 MG Oral Tab, Take 1 tablet by mouth every 4 (four) hours as needed for Pain., Disp: 60 tablet, Rfl: 0  •  LIVALO 4 MG Oral Tab, TAKE 1 TABLET BY MOUTH EVERY EVENING, Disp: 90 tablet, Rfl: 0  •  Tanisha Cameron In Right knee: He exhibits decreased range of motion and swelling. He exhibits no effusion, no ecchymosis, no deformity, no laceration, no erythema, normal alignment, no LCL laxity, no bony tenderness, normal meniscus and no MCL laxity. Tenderness found.

## 2017-08-05 NOTE — TELEPHONE ENCOUNTER
Refilled per protocol.    Refill Protocol Appointment Criteria  · Appointment scheduled in the past 12 months or in the next 3 months  Recent Outpatient Visits            1 week ago Annual physical exam    [address], [address], [place]

## 2017-08-07 ENCOUNTER — LAB ENCOUNTER (OUTPATIENT)
Dept: LAB | Age: 62
End: 2017-08-07
Attending: INTERNAL MEDICINE
Payer: COMMERCIAL

## 2017-08-07 DIAGNOSIS — E11.9 TYPE 2 DIABETES MELLITUS WITHOUT COMPLICATION, WITHOUT LONG-TERM CURRENT USE OF INSULIN (HCC): ICD-10-CM

## 2017-08-07 DIAGNOSIS — I10 ESSENTIAL HYPERTENSION: ICD-10-CM

## 2017-08-07 DIAGNOSIS — Z00.00 ANNUAL PHYSICAL EXAM: ICD-10-CM

## 2017-08-07 LAB
ALBUMIN SERPL BCP-MCNC: 3.8 G/DL (ref 3.5–4.8)
ALBUMIN/GLOB SERPL: 1 {RATIO} (ref 1–2)
ALP SERPL-CCNC: 74 U/L (ref 32–100)
ALT SERPL-CCNC: 25 U/L (ref 17–63)
ANION GAP SERPL CALC-SCNC: 6 MMOL/L (ref 0–18)
AST SERPL-CCNC: 18 U/L (ref 15–41)
BASOPHILS # BLD: 0.1 K/UL (ref 0–0.2)
BASOPHILS NFR BLD: 1 %
BILIRUB SERPL-MCNC: 0.6 MG/DL (ref 0.3–1.2)
BILIRUB UR QL: NEGATIVE
BUN SERPL-MCNC: 12 MG/DL (ref 8–20)
BUN/CREAT SERPL: 11.2 (ref 10–20)
CALCIUM SERPL-MCNC: 8.9 MG/DL (ref 8.5–10.5)
CHLORIDE SERPL-SCNC: 106 MMOL/L (ref 95–110)
CHOLEST SERPL-MCNC: 167 MG/DL (ref 110–200)
CLARITY UR: CLEAR
CO2 SERPL-SCNC: 26 MMOL/L (ref 22–32)
COLOR UR: YELLOW
CREAT SERPL-MCNC: 1.07 MG/DL (ref 0.5–1.5)
CREAT UR-MCNC: 106.1 MG/DL
EOSINOPHIL # BLD: 0.1 K/UL (ref 0–0.7)
EOSINOPHIL NFR BLD: 2 %
ERYTHROCYTE [DISTWIDTH] IN BLOOD BY AUTOMATED COUNT: 13.8 % (ref 11–15)
GLOBULIN PLAS-MCNC: 3.9 G/DL (ref 2.5–3.7)
GLUCOSE SERPL-MCNC: 112 MG/DL (ref 70–99)
GLUCOSE UR-MCNC: NEGATIVE MG/DL
HBA1C MFR BLD: 5.6 % (ref 4–6)
HCT VFR BLD AUTO: 42.9 % (ref 41–52)
HDLC SERPL-MCNC: 39 MG/DL
HGB BLD-MCNC: 14.5 G/DL (ref 13.5–17.5)
HGB UR QL STRIP.AUTO: NEGATIVE
KETONES UR-MCNC: NEGATIVE MG/DL
LDLC SERPL CALC-MCNC: 99 MG/DL (ref 0–99)
LEUKOCYTE ESTERASE UR QL STRIP.AUTO: NEGATIVE
LYMPHOCYTES # BLD: 2.3 K/UL (ref 1–4)
LYMPHOCYTES NFR BLD: 28 %
MCH RBC QN AUTO: 30.4 PG (ref 27–32)
MCHC RBC AUTO-ENTMCNC: 33.9 G/DL (ref 32–37)
MCV RBC AUTO: 89.7 FL (ref 80–100)
MICROALBUMIN UR-MCNC: 0.7 MG/DL (ref 0–1.8)
MICROALBUMIN/CREAT UR: 6.6 MG/G{CREAT} (ref 0–20)
MONOCYTES # BLD: 0.7 K/UL (ref 0–1)
MONOCYTES NFR BLD: 8 %
NEUTROPHILS # BLD AUTO: 5 K/UL (ref 1.8–7.7)
NEUTROPHILS NFR BLD: 61 %
NITRITE UR QL STRIP.AUTO: NEGATIVE
NONHDLC SERPL-MCNC: 128 MG/DL
OSMOLALITY UR CALC.SUM OF ELEC: 287 MOSM/KG (ref 275–295)
PH UR: 5 [PH] (ref 5–8)
PLATELET # BLD AUTO: 273 K/UL (ref 140–400)
PMV BLD AUTO: 8.7 FL (ref 7.4–10.3)
POTASSIUM SERPL-SCNC: 4.1 MMOL/L (ref 3.3–5.1)
PROT SERPL-MCNC: 7.7 G/DL (ref 5.9–8.4)
PROT UR-MCNC: NEGATIVE MG/DL
RBC # BLD AUTO: 4.78 M/UL (ref 4.5–5.9)
SODIUM SERPL-SCNC: 138 MMOL/L (ref 136–144)
SP GR UR STRIP: 1.01 (ref 1–1.03)
TRIGL SERPL-MCNC: 143 MG/DL (ref 1–149)
TSH SERPL-ACNC: 2.87 UIU/ML (ref 0.45–5.33)
UROBILINOGEN UR STRIP-ACNC: <2
VIT C UR-MCNC: NEGATIVE MG/DL
WBC # BLD AUTO: 8.2 K/UL (ref 4–11)

## 2017-08-07 PROCEDURE — 85025 COMPLETE CBC W/AUTO DIFF WBC: CPT

## 2017-08-07 PROCEDURE — 36415 COLL VENOUS BLD VENIPUNCTURE: CPT

## 2017-08-07 PROCEDURE — 80061 LIPID PANEL: CPT

## 2017-08-07 PROCEDURE — 80053 COMPREHEN METABOLIC PANEL: CPT

## 2017-08-07 PROCEDURE — 84443 ASSAY THYROID STIM HORMONE: CPT

## 2017-08-07 PROCEDURE — 82570 ASSAY OF URINE CREATININE: CPT

## 2017-08-07 PROCEDURE — 82043 UR ALBUMIN QUANTITATIVE: CPT

## 2017-08-07 PROCEDURE — 83036 HEMOGLOBIN GLYCOSYLATED A1C: CPT

## 2017-08-07 PROCEDURE — 81003 URINALYSIS AUTO W/O SCOPE: CPT

## 2017-08-22 ENCOUNTER — OFFICE VISIT (OUTPATIENT)
Dept: PODIATRY CLINIC | Facility: CLINIC | Age: 62
End: 2017-08-22

## 2017-08-22 DIAGNOSIS — E11.9 TYPE 2 DIABETES MELLITUS WITHOUT COMPLICATION, WITHOUT LONG-TERM CURRENT USE OF INSULIN (HCC): Primary | ICD-10-CM

## 2017-08-22 DIAGNOSIS — B35.3 TINEA PEDIS OF BOTH FEET: ICD-10-CM

## 2017-08-22 PROCEDURE — 99203 OFFICE O/P NEW LOW 30 MIN: CPT | Performed by: PODIATRIST

## 2017-08-22 PROCEDURE — 99212 OFFICE O/P EST SF 10 MIN: CPT | Performed by: PODIATRIST

## 2017-08-22 RX ORDER — LULICONAZOLE 10 MG/G
1 CREAM TOPICAL DAILY
Qty: 1 TUBE | Refills: 1 | Status: SHIPPED | OUTPATIENT
Start: 2017-08-22

## 2017-08-23 NOTE — PROGRESS NOTES
HPI:    Patient ID: Cheri Haas is a 65year old male. HPI  This pleasant 59-year-old male presents as a new patient to me and states that he is self-referred.   He states that he is here for a diabetic foot evaluation and treatment associated w fifth toes and as well the third and fourth. The right foot is significantly worse than the left. There is open area but no evidence of infection there is no active nor exudative draining nor odor. Clearly these areas are consistent with tinea.   I have

## 2017-09-05 ENCOUNTER — OFFICE VISIT (OUTPATIENT)
Dept: PODIATRY CLINIC | Facility: CLINIC | Age: 62
End: 2017-09-05

## 2017-09-05 DIAGNOSIS — B35.3 TINEA PEDIS OF BOTH FEET: Primary | ICD-10-CM

## 2017-09-05 PROCEDURE — 99212 OFFICE O/P EST SF 10 MIN: CPT | Performed by: PODIATRIST

## 2017-09-05 NOTE — PROGRESS NOTES
HPI:    Patient ID: Kiran Moran is a 65year old male. HPI  61-year-old diabetic presents for follow-up in reference to care associated with tinea.   He is accompanied today by his wife and they report a dramatic and significant change associate

## 2017-09-14 ENCOUNTER — OFFICE VISIT (OUTPATIENT)
Dept: GASTROENTEROLOGY | Facility: CLINIC | Age: 62
End: 2017-09-14

## 2017-09-14 ENCOUNTER — TELEPHONE (OUTPATIENT)
Dept: GASTROENTEROLOGY | Facility: CLINIC | Age: 62
End: 2017-09-14

## 2017-09-14 VITALS — HEIGHT: 70 IN | BODY MASS INDEX: 44.52 KG/M2 | WEIGHT: 311 LBS

## 2017-09-14 DIAGNOSIS — Z12.11 SCREENING FOR COLORECTAL CANCER: Primary | ICD-10-CM

## 2017-09-14 DIAGNOSIS — Z12.12 SCREENING FOR COLORECTAL CANCER: Primary | ICD-10-CM

## 2017-09-14 DIAGNOSIS — Z12.11 COLON CANCER SCREENING: Primary | ICD-10-CM

## 2017-09-14 PROCEDURE — 99212 OFFICE O/P EST SF 10 MIN: CPT | Performed by: INTERNAL MEDICINE

## 2017-09-14 PROCEDURE — 99243 OFF/OP CNSLTJ NEW/EST LOW 30: CPT | Performed by: INTERNAL MEDICINE

## 2017-09-14 NOTE — TELEPHONE ENCOUNTER
Scheduled for:  Colonoscopy 81499  Provider Name:   Date:  11/29/17  Location:  [facility]   Sedation:  IV Sedation  Time:  0900 am Esequiel Mimes 0800 am  Prep: Suprep or Colyte  Meds/Allergies Reconciled?:  Physician reviewed  Diagnosis with codes:  Colon Ca. Scr

## 2017-09-14 NOTE — PROGRESS NOTES
HPI:    Patient ID: Evy Fuentes is a 65year old male. HPI  Kasie Mcqueen is seen today with his wife. He presents for a discussion regarding colorectal cancer screening. His last colonoscopy was in March 2004 performed for diverticulitis.   The examin Disp: 60 tablet Rfl: 0   LIVALO 4 MG Oral Tab TAKE 1 TABLET BY MOUTH EVERY EVENING Disp: 90 tablet Rfl: 0   METFORMIN  MG Oral Tab TAKE 1 TABLET (500 MG TOTAL) BY MOUTH 2 (TWO) TIMES DAILY WITH MEALS.  Disp: 180 tablet Rfl: 1   Metoprolol Succinate E 32 - 100 U/L 74   Total Bilirubin      0.3 - 1.2 mg/dL 0.6   TOTAL PROTEIN      5.9 - 8.4 g/dL 7.7   Albumin      3.5 - 4.8 g/dL 3.8   GLOBULIN, TOTAL      2.5 - 3.7 g/dL 3.9 (H)   A/G RATIO      1.0 - 2.0 1.0   ANION GAP      0 - 18 mmol/L 6   BUN/CREA Visit:  Signed Prescriptions Disp Refills    Na Sulfate-K Sulfate-Mg Sulf (SUPREP BOWEL PREP KIT) 17.5-3.13-1.6 GM/180ML Oral Solution 1 Bottle 0      Sig: Take as directed           Imaging & Referrals:  None       PRATIBHA#[id number]

## 2017-10-19 RX ORDER — METOPROLOL SUCCINATE 25 MG/1
25 TABLET, EXTENDED RELEASE ORAL DAILY
Qty: 90 TABLET | Refills: 0 | Status: SHIPPED | OUTPATIENT
Start: 2017-10-19 | End: 2018-01-14

## 2017-11-16 ENCOUNTER — TELEPHONE (OUTPATIENT)
Dept: GASTROENTEROLOGY | Facility: CLINIC | Age: 62
End: 2017-11-16

## 2017-11-16 NOTE — TELEPHONE ENCOUNTER
LM on both numbers that Suprep prep instructions were faxed today to [phone]    To call back with any additional questions on prep    Suprep was sent to pharmacy, do not see notation by staff that his was ever switched to Colyte

## 2017-11-27 NOTE — TELEPHONE ENCOUNTER
Suprep was printed not erxed. Pt called back today to state pharmacy never received.   erx sent again now and will check back with pharmacy to make sure this is covered    Pharmacy contacted and Suprep only $10. Pt aware of all    Aware he can pick this

## 2017-11-29 ENCOUNTER — HOSPITAL ENCOUNTER (OUTPATIENT)
Facility: HOSPITAL | Age: 62
Setting detail: HOSPITAL OUTPATIENT SURGERY
Discharge: HOME | End: 2017-11-29
Attending: INTERNAL MEDICINE | Admitting: INTERNAL MEDICINE
Payer: COMMERCIAL

## 2017-11-29 ENCOUNTER — SURGERY (OUTPATIENT)
Age: 62
End: 2017-11-29

## 2017-11-29 DIAGNOSIS — Z12.11 COLON CANCER SCREENING: ICD-10-CM

## 2017-11-29 PROCEDURE — 45385 COLONOSCOPY W/LESION REMOVAL: CPT | Performed by: INTERNAL MEDICINE

## 2017-11-29 PROCEDURE — 0DBM8ZX EXCISION OF DESCENDING COLON, VIA NATURAL OR ARTIFICIAL OPENING ENDOSCOPIC, DIAGNOSTIC: ICD-10-PCS | Performed by: INTERNAL MEDICINE

## 2017-11-29 PROCEDURE — 99152 MOD SED SAME PHYS/QHP 5/>YRS: CPT | Performed by: INTERNAL MEDICINE

## 2017-11-29 PROCEDURE — 99153 MOD SED SAME PHYS/QHP EA: CPT | Performed by: INTERNAL MEDICINE

## 2017-11-29 RX ORDER — MIDAZOLAM HYDROCHLORIDE 1 MG/ML
INJECTION INTRAMUSCULAR; INTRAVENOUS
Status: DISCONTINUED | OUTPATIENT
Start: 2017-11-29 | End: 2017-11-29

## 2017-11-29 RX ORDER — MIDAZOLAM HYDROCHLORIDE 1 MG/ML
1 INJECTION INTRAMUSCULAR; INTRAVENOUS EVERY 5 MIN PRN
Status: DISCONTINUED | OUTPATIENT
Start: 2017-11-29 | End: 2017-11-29

## 2017-11-29 RX ORDER — SODIUM CHLORIDE, SODIUM LACTATE, POTASSIUM CHLORIDE, CALCIUM CHLORIDE 600; 310; 30; 20 MG/100ML; MG/100ML; MG/100ML; MG/100ML
INJECTION, SOLUTION INTRAVENOUS CONTINUOUS
Status: DISCONTINUED | OUTPATIENT
Start: 2017-11-29 | End: 2017-11-29

## 2017-11-29 RX ORDER — SODIUM CHLORIDE 0.9 % (FLUSH) 0.9 %
10 SYRINGE (ML) INJECTION AS NEEDED
Status: DISCONTINUED | OUTPATIENT
Start: 2017-11-29 | End: 2017-11-29

## 2017-11-29 NOTE — OPERATIVE REPORT
[facility] Report      Date of Procedure:  11/29/17      Preoperative Diagnosis:  Colorectal cancer screening      Postoperative Diagnosis:  1. Descending colon polyps  2.   Sigmoid colon diverticulosis      Procedure:    Colonosc Small descending colon polyps  2. Sigmoid colon diverticulosis currently uncomplicated    Recommendations:  1. Continue high-fiber diet. 2.  Follow-up biopsy results. Polyp histology to determine recommendations regarding follow-up.         Pamla Dural

## 2017-11-29 NOTE — H&P
History & Physical Examination    Patient Name: Sammi Hoff  MRN: [id number]  CSN: [id number]  YOB: 1955    Diagnosis: Colorectal cancer screening        Prescriptions Prior to Admission:  Na Sulfate-K Sulfate-Mg Sulf (SUPREP BOWEL P Disease Father    • Cancer Mother      Lung cancer   • Hypertension Mother    • Diabetes Brother    • Lipids Other      Family H/o: Hyperlipidemia   • Glaucoma Neg         Smoking status: Former Smoker  1.50 Packs/day  For 30.00 Years     Quit date: 8/14/2

## 2017-11-30 ENCOUNTER — TELEPHONE (OUTPATIENT)
Dept: INTERNAL MEDICINE CLINIC | Facility: CLINIC | Age: 62
End: 2017-11-30

## 2017-11-30 VITALS
OXYGEN SATURATION: 96 % | DIASTOLIC BLOOD PRESSURE: 88 MMHG | HEART RATE: 79 BPM | BODY MASS INDEX: 46.65 KG/M2 | HEIGHT: 69 IN | RESPIRATION RATE: 14 BRPM | SYSTOLIC BLOOD PRESSURE: 138 MMHG | WEIGHT: 315 LBS

## 2017-11-30 NOTE — TELEPHONE ENCOUNTER
Pt called in stating he was using MyChart and getting familiar with it and noticed that the 3 medications below were stating they were removed.  Pt states he wanted to make sure medications were still active so he would be able to request refills via [employer]

## 2017-12-04 ENCOUNTER — TELEPHONE (OUTPATIENT)
Dept: GASTROENTEROLOGY | Facility: CLINIC | Age: 62
End: 2017-12-04

## 2017-12-04 NOTE — TELEPHONE ENCOUNTER
Received call from patient who reports he already picked up the metformin from the pharmacy and will  the Livalo. Patient denied having any other concerns at the moment.

## 2017-12-04 NOTE — TELEPHONE ENCOUNTER
Refills on Metformin and Livalo, he is almost out of metformin and Livalo he is completely out of. pls advise

## 2017-12-04 NOTE — TELEPHONE ENCOUNTER
Pharmacy     [id number], [address]. AT [address] [facility] [address], [phone], [phone]   Medication Detail      Disp Refills Start End    Pitavastatin Calcium (LIVALO) 4 MG Oral Tab 90 tablet 3 12/4/2017     Sig: TAKE 1 TABLET BY MOUTH EVERY EVENING    E-Prescribing Status: Receipt confirmed by pharmacy (12/4/2017 10:45 AM CST)    Print [place]     Medication Detail      Disp Refills Start End    MetFORMIN HCl 500 MG Oral Tab 180 tablet 3 12/4/2017     Sig: TAKE 1 TABLET (500 MG TOTAL) BY MOUTH 2 (TWO) TIMES DAILY WITH MEALS.     E-Prescribing Status: Receipt confirmed by pharmacy (12/4/2017 10:45 AM CST)

## 2017-12-04 NOTE — TELEPHONE ENCOUNTER
----- Message from Melissa Briscoe MD sent at 12/2/2017  1:15 PM CST -----  Please see the following My Chart message sent to the patient:     I \"left a message on your voicemail.     Your recent colonoscopy revealed #2 small polyps which were removed

## 2017-12-04 NOTE — TELEPHONE ENCOUNTER
From: Rafael Will  Sent: 12/4/2017 12:34 PM CST  Subject: Medication Renewal Request    [place] Saji would like a refill of the following medications:     Pitavastatin Calcium (LIVALO) 4 MG Oral Tab Courtney Wilson MD]     MetFORMIN HCl 500 MG Oral Tab Courtney Wilson MD]    Preferred pharmacy: [id number] - [place] - [address]  AT [address] [place] [address], [phone], [phone]

## 2018-01-02 ENCOUNTER — OFFICE VISIT (OUTPATIENT)
Dept: INTERNAL MEDICINE CLINIC | Facility: CLINIC | Age: 63
End: 2018-01-02

## 2018-01-02 VITALS
TEMPERATURE: 98 F | WEIGHT: 315 LBS | HEIGHT: 70 IN | HEART RATE: 85 BPM | BODY MASS INDEX: 45.1 KG/M2 | SYSTOLIC BLOOD PRESSURE: 155 MMHG | OXYGEN SATURATION: 95 % | DIASTOLIC BLOOD PRESSURE: 89 MMHG

## 2018-01-02 DIAGNOSIS — J40 BRONCHITIS: Primary | ICD-10-CM

## 2018-01-02 PROCEDURE — 99212 OFFICE O/P EST SF 10 MIN: CPT | Performed by: INTERNAL MEDICINE

## 2018-01-02 PROCEDURE — 99213 OFFICE O/P EST LOW 20 MIN: CPT | Performed by: INTERNAL MEDICINE

## 2018-01-02 NOTE — PATIENT INSTRUCTIONS
1.  Take hot showers, steams, drink plenty of fluids, cough suppressants. What Is Acute Bronchitis? Acute bronchitis is when the airways in your lungs (bronchial tubes) become red and swollen (inflamed). It is usually caused by a viral infection.  But · A nasal or throat swab. This tests to see if you have a bacterial infection. · A chest X-ray. This is done if your healthcare provider thinks you have pneumonia. · Tests to check for an underlying condition.  Other tests may be done to check for things · Take all of the medicine. Take the medicine until it is used up, even if symptoms have improved. If you don’t, the bronchitis may come back. · Take the medicines as directed. For instance, some medicines should be taken with food.   · Ask about side effe

## 2018-01-02 NOTE — PROGRESS NOTES
Patient ID: Cristóbal Galaviz is a 65year old male. Patient presents with:  Cough: Began yesterday.        HISTORY OF PRESENT ILLNESS:   HPI  2 day(s) ago  Fever - No, Tmax (N/A)  Cough - Yes, productive - Yes, color - white  Sick contacts - No  Tra WITH MEALS., Disp: 180 tablet, Rfl: 3  •  METOPROLOL SUCCINATE ER 25 MG Oral Tablet 24 Hr, TAKE 1 TABLET (25 MG TOTAL) BY MOUTH DAILY. , Disp: 90 tablet, Rfl: 0  •  Luliconazole (LUZU) 1 % External Cream, Apply 1 Application topically daily. , Disp: 1 Tube, Bronchitis  · Symptomatic treatment with hot showers, steams, cough suppressants, warm fluids, saltwater gargling. · No antibiotics needed. · Told to keep chest and head covered in this cold weather. Return if symptoms worsen or fail to improve.     Am

## 2018-01-04 ENCOUNTER — TELEPHONE (OUTPATIENT)
Dept: OTHER | Age: 63
End: 2018-01-04

## 2018-01-04 RX ORDER — AZITHROMYCIN 250 MG/1
TABLET, FILM COATED ORAL
Qty: 6 TABLET | Refills: 0 | Status: SHIPPED | OUTPATIENT
Start: 2018-01-04 | End: 2018-06-02

## 2018-01-04 NOTE — TELEPHONE ENCOUNTER
Pt returned call and was informed of Rx sent in as requested. Advised to call back if no improvement. Pt thankful and denies further questions/concerns at this time.

## 2018-01-04 NOTE — TELEPHONE ENCOUNTER
Esperanza Sebastian Pt calling us today and stated that he was seen yesterday but he now feels that his congestion has gone down to his chest. He now has chest congestion and is requesting a abx to be called sent to the pharmacy. Pharmacy has been verified.  No other

## 2018-01-15 RX ORDER — METOPROLOL SUCCINATE 25 MG/1
25 TABLET, EXTENDED RELEASE ORAL DAILY
Qty: 90 TABLET | Refills: 0 | Status: SHIPPED | OUTPATIENT
Start: 2018-01-15 | End: 2018-09-30

## 2018-01-15 NOTE — TELEPHONE ENCOUNTER
METOPROLOL Medication refilled for 90 days per protocol.     Hypertensive Medications  Protocol Criteria:  · Appointment scheduled in the past 6 months or in the next 3 months  · BMP or CMP in the past 12 months  · Creatinine result < 2  Recent Outpatient V

## 2018-02-20 ENCOUNTER — OFFICE VISIT (OUTPATIENT)
Dept: INTERNAL MEDICINE CLINIC | Facility: CLINIC | Age: 63
End: 2018-02-20

## 2018-02-20 VITALS
TEMPERATURE: 98 F | HEIGHT: 70 IN | DIASTOLIC BLOOD PRESSURE: 72 MMHG | BODY MASS INDEX: 45.1 KG/M2 | SYSTOLIC BLOOD PRESSURE: 128 MMHG | WEIGHT: 315 LBS | HEART RATE: 81 BPM

## 2018-02-20 DIAGNOSIS — M17.0 PRIMARY OSTEOARTHRITIS OF BOTH KNEES: ICD-10-CM

## 2018-02-20 DIAGNOSIS — I10 ESSENTIAL HYPERTENSION: Primary | ICD-10-CM

## 2018-02-20 DIAGNOSIS — E66.01 MORBID OBESITY, UNSPECIFIED OBESITY TYPE (HCC): ICD-10-CM

## 2018-02-20 PROCEDURE — 99214 OFFICE O/P EST MOD 30 MIN: CPT | Performed by: INTERNAL MEDICINE

## 2018-02-20 PROCEDURE — 99212 OFFICE O/P EST SF 10 MIN: CPT | Performed by: INTERNAL MEDICINE

## 2018-02-20 NOTE — PATIENT INSTRUCTIONS
1.  Get new blood pressure machine. 2.  Weight loss is a must.  Make short attainable goals. Osteoarthritis  Osteoarthritis (also called degenerative joint disease) happens when the cartilage in a joint becomes damaged and worn.  This may be due to ag Follow up with your healthcare provider as advised by our staff.   When to seek medical advice  Call your healthcare provider right away if any of these occur:  · Redness or swelling of a painful joint  · Discharge or pus from a painful joint  · Fever of 10 Fact: This seems like it should be true, but it’s not. When you eat too few calories, your body acts as if it’s on a desert [address]. It thinks food is scarce, so it slows down your metabolism (how fast you burn calories) to save energy.  By eating too few ca Your goal doesn't even have to be a specific weight. You may decide on a fitness goal (such as being able to walk 10 miles a week), or a health goal (such as lowering your blood pressure).  Choose a goal that is measurable and reasonable, so you know when y This diet removes foods that are high in salt. It also limits the amount of salt you use when cooking. It is most often used for people with high blood pressure, edema (fluid retention), and kidney, liver, or heart disease.   Table salt contains the mineral Avoid: Flavored coffees, electrolyte replacement drinks, sports drinks  Meats  Ok: All fresh meat, fish, poultry, low-salt tuna, eggs, egg substitute  Avoid: Smoked, pickled, brine-cured, or salted meats and fish.  This includes philip, chipped beef, corned

## 2018-02-20 NOTE — PROGRESS NOTES
Patient ID: Danay Ochoa is a 59year old male. Patient presents with: Follow - Up: BP        HISTORY OF PRESENT ILLNESS:   HPI  Patient presents for above. Here for follow-up of multiple issues.   Has been tracking his blood pressure at home Tablet 24 Hr, TAKE 1 TABLET (25 MG TOTAL) BY MOUTH DAILY. , Disp: 90 tablet, Rfl: 0  •  Pitavastatin Calcium (LIVALO) 4 MG Oral Tab, TAKE 1 TABLET BY MOUTH EVERY EVENING, Disp: 90 tablet, Rfl: 3  •  MetFORMIN HCl 500 MG Oral Tab, TAKE 1 TABLET (500 MG TOTAL Effort normal and breath sounds normal. No respiratory distress. Musculoskeletal:        Right knee: He exhibits decreased range of motion. Left knee: He exhibits decreased range of motion.    Neurological: He is alert and oriented to person, place

## 2018-06-02 ENCOUNTER — OFFICE VISIT (OUTPATIENT)
Dept: INTERNAL MEDICINE CLINIC | Facility: CLINIC | Age: 63
End: 2018-06-02

## 2018-06-02 VITALS
SYSTOLIC BLOOD PRESSURE: 145 MMHG | BODY MASS INDEX: 45.1 KG/M2 | HEART RATE: 92 BPM | WEIGHT: 315 LBS | HEIGHT: 70 IN | DIASTOLIC BLOOD PRESSURE: 90 MMHG

## 2018-06-02 DIAGNOSIS — I10 ESSENTIAL HYPERTENSION: ICD-10-CM

## 2018-06-02 DIAGNOSIS — E66.01 OBESITY, CLASS III, BMI 40-49.9 (MORBID OBESITY) (HCC): Primary | ICD-10-CM

## 2018-06-02 PROCEDURE — 99214 OFFICE O/P EST MOD 30 MIN: CPT | Performed by: INTERNAL MEDICINE

## 2018-06-02 PROCEDURE — 99212 OFFICE O/P EST SF 10 MIN: CPT | Performed by: INTERNAL MEDICINE

## 2018-06-02 NOTE — PROGRESS NOTES
Patient ID: Cathleen Escudero is a 59year old male. Patient presents with:  HTN: did not take blood pressure meds this morning        HISTORY OF PRESENT ILLNESS:   HPI  Patient presents for above. Here for follow-up of multiple issues.   Has contin REPLACEMENT      Comment: R Total Knee june 12, 2017      Current Outpatient Prescriptions:   •  METOPROLOL SUCCINATE ER 25 MG Oral Tablet 24 Hr, TAKE 1 TABLET (25 MG TOTAL) BY MOUTH DAILY. , Disp: 90 tablet, Rfl: 0  •  Pitavastatin Calcium (LIVALO) 4 MG Or ASSESSMENT/PLAN:   1. Obesity, Class III, BMI 40-49.9 (morbid obesity) (HCC)  · BARIATRICS - INTERNAL  · Diet and exercise discussed. · Weight loss handout given.     2. Essential hypertension  · Explained importance of taking medications on a daily basi

## 2018-06-02 NOTE — PATIENT INSTRUCTIONS
Losing Weight for Heart Health  Excess weight is a major risk factor for heart disease.  Losing weight has many benefits including lowering your blood pressure, improving your cholesterol level, and decreasing your risk for diseases such as diabetes and h · Add more time and speed to your walk. Build up as you feel able. · Aim for 3 to 4 sessions of aerobic exercise a week. Each session should last about 40 minutes and include moderate to vigorous physical activity.   · The most important part of the [place] · Eat a variety of foods, not just a few favorites. · If you find yourself eating when you’re not hungry, ask yourself why. Many of us eat when we’re bored, stressed, or just to be polite. Listen to your body.  If you’re not hungry, get busy doing somethin

## 2018-06-07 ENCOUNTER — OFFICE VISIT (OUTPATIENT)
Dept: SURGERY | Facility: CLINIC | Age: 63
End: 2018-06-07

## 2018-06-07 VITALS
OXYGEN SATURATION: 97 % | TEMPERATURE: 98 F | HEIGHT: 70 IN | WEIGHT: 315 LBS | BODY MASS INDEX: 45.1 KG/M2 | DIASTOLIC BLOOD PRESSURE: 82 MMHG | SYSTOLIC BLOOD PRESSURE: 130 MMHG | HEART RATE: 80 BPM | RESPIRATION RATE: 18 BRPM

## 2018-06-07 DIAGNOSIS — I10 ESSENTIAL HYPERTENSION: ICD-10-CM

## 2018-06-07 DIAGNOSIS — M17.0 PRIMARY OSTEOARTHRITIS OF BOTH KNEES: ICD-10-CM

## 2018-06-07 DIAGNOSIS — E78.5 DYSLIPIDEMIA: ICD-10-CM

## 2018-06-07 DIAGNOSIS — E66.01 OBESITY, CLASS III, BMI 40-49.9 (MORBID OBESITY) (HCC): Primary | ICD-10-CM

## 2018-06-07 DIAGNOSIS — E11.9 TYPE 2 DIABETES MELLITUS WITHOUT COMPLICATION, WITHOUT LONG-TERM CURRENT USE OF INSULIN (HCC): ICD-10-CM

## 2018-06-07 PROCEDURE — 99203 OFFICE O/P NEW LOW 30 MIN: CPT | Performed by: NURSE PRACTITIONER

## 2018-06-07 NOTE — PROGRESS NOTES
[facility] Consultation Note       Date of Consult:  2018    Patient:  Alyssa Cazares  :      1955  MRN:      [id number]    Referring Provider: Dr. Keyla Goldstein    Chief Complaint:  Patient presents with:  Consult  Obesity lb   SpO2 97%   BMI 47.06 kg/m²      Patient Medications:      Current Outpatient Prescriptions:  METOPROLOL SUCCINATE ER 25 MG Oral Tablet 24 Hr TAKE 1 TABLET (25 MG TOTAL) BY MOUTH DAILY.  Disp: 90 tablet Rfl: 0   Pitavastatin Calcium (LIVALO) 4 MG Oral T Problem Relation Age of Onset   • Heart Disease Father    • Cancer Mother      Lung cancer   • Hypertension Mother    • Diabetes Brother    • Lipids Other      Family H/o: Hyperlipidemia   • Glaucoma Neg            Typical Dietary Intake:  Breakfast Lunc to person, place, and time. He appears well-developed and well-nourished. No distress. HENT:   Head: Normocephalic and atraumatic. Neck: Neck supple. Pulmonary/Chest: Effort normal. No respiratory distress. Abdominal: Soft.    Musculoskeletal: Elza Fix day      Avoid soda/juice     Increase exercise- walk 3-5 times per week for 20-30 minutes at a time (goal is 150 minutes exercise per week)     Advised Patient not to skip meals    Eat 3 balanced meals per day    Provided Patient a list of healthier snack

## 2018-07-05 ENCOUNTER — OFFICE VISIT (OUTPATIENT)
Dept: SURGERY | Facility: CLINIC | Age: 63
End: 2018-07-05

## 2018-07-05 VITALS
OXYGEN SATURATION: 95 % | SYSTOLIC BLOOD PRESSURE: 120 MMHG | BODY MASS INDEX: 45.1 KG/M2 | HEIGHT: 70 IN | DIASTOLIC BLOOD PRESSURE: 70 MMHG | RESPIRATION RATE: 16 BRPM | WEIGHT: 315 LBS | HEART RATE: 68 BPM

## 2018-07-05 DIAGNOSIS — I10 ESSENTIAL HYPERTENSION: ICD-10-CM

## 2018-07-05 DIAGNOSIS — E66.01 OBESITY, CLASS III, BMI 40-49.9 (MORBID OBESITY) (HCC): Primary | ICD-10-CM

## 2018-07-05 DIAGNOSIS — E78.5 DYSLIPIDEMIA: ICD-10-CM

## 2018-07-05 DIAGNOSIS — E11.9 TYPE 2 DIABETES MELLITUS WITHOUT COMPLICATION, WITHOUT LONG-TERM CURRENT USE OF INSULIN (HCC): ICD-10-CM

## 2018-07-05 DIAGNOSIS — M17.0 PRIMARY OSTEOARTHRITIS OF BOTH KNEES: ICD-10-CM

## 2018-07-05 PROCEDURE — 99213 OFFICE O/P EST LOW 20 MIN: CPT | Performed by: NURSE PRACTITIONER

## 2018-07-05 NOTE — PROGRESS NOTES
[address] [place] [address], [address] [address] [address], [address]  [address]  Dept: [phone]     Date:   2018    Patient:  Mariah Hurtado  :      1955  MRN:      [id number]    Deshawn Yan [Terbinafine]     Social History:      Social History  Social History   Marital status:   Spouse name: N/A    Years of education: N/A  Number of children: N/A     Occupational History  None on file     Social History Main Topics   Smoking status: Cu carbohydrates to 100 gms per day, Eat 100-200 calories within 1 hour of waking  and Eat 3-4 cups of fresh fruits or vegetables daily    Behavior Modifications Reviewed and Discussed  Eat breakfast, Eat 3 meals per day, Plan meals in advance, Read nutrition medications. No interval change in antihypertensive medication; per Dr. Jacky Hernández notes- has hx of non-compliance with BP meds     DYSLIPIDEMIA: Stable on the above prescribed meal plan and medication.  Liver function stable.        Lab Results  Component Yessy

## 2018-07-05 NOTE — PATIENT INSTRUCTIONS
Goals for next month:  1. Keep a food log using [facility]  2. Drink 48-64 ounces of water per day. No fruit juices or regular soda. 3. Increase aerobic exercises (goal is 150 minutes per week)  4. Increase fruit and vegetable servings/day  5.  Keep carbs at or b

## 2018-08-02 ENCOUNTER — OFFICE VISIT (OUTPATIENT)
Dept: OPTOMETRY | Facility: CLINIC | Age: 63
End: 2018-08-02
Payer: COMMERCIAL

## 2018-08-02 DIAGNOSIS — E11.9 TYPE 2 DIABETES MELLITUS WITHOUT COMPLICATION, WITHOUT LONG-TERM CURRENT USE OF INSULIN (HCC): Primary | ICD-10-CM

## 2018-08-02 DIAGNOSIS — H25.13 AGE-RELATED NUCLEAR CATARACT OF BOTH EYES: ICD-10-CM

## 2018-08-02 DIAGNOSIS — H52.4 ASTIGMATISM OF BOTH EYES WITH PRESBYOPIA: ICD-10-CM

## 2018-08-02 DIAGNOSIS — H52.203 ASTIGMATISM OF BOTH EYES WITH PRESBYOPIA: ICD-10-CM

## 2018-08-02 PROCEDURE — 92014 COMPRE OPH EXAM EST PT 1/>: CPT | Performed by: OPTOMETRIST

## 2018-08-02 RX ORDER — CYCLOBENZAPRINE HCL 10 MG
TABLET ORAL
Refills: 3 | COMMUNITY
Start: 2018-06-04 | End: 2021-09-29 | Stop reason: ALTCHOICE

## 2018-08-02 NOTE — PATIENT INSTRUCTIONS
Astigmatism with presbyopia  Patient will stay with OTC  Reading glasses --does not want an RX. Senile cataract  No treatment is required. Will continue to observe.     Type II diabetes mellitus ([facility] [address]  I advised patient that there is no background diabet

## 2018-08-02 NOTE — PROGRESS NOTES
Marcela Cole is a 59year old male. HPI:     HPI     Diabetic Eye Exam   Diabetes characteristics include Type 2, controlled with diet and taking oral medications. Duration of 8 years. Number of years on pills 8.   Number of years on insulin Prescriptions:  Cyclobenzaprine HCl 10 MG Oral Tab TAKE 1 TABLET(S) BY MOUTH EVERY 8 HOURS AS NEEDED FOR MUSCLE SPASMS Disp:  Rfl: 3   METOPROLOL SUCCINATE ER 25 MG Oral Tablet 24 Hr TAKE 1 TABLET (25 MG TOTAL) BY MOUTH DAILY.  Disp: 90 tablet Rfl: 0   Soraya Exam       Right Left    Lids/Lashes Meibomian gland dysfunction Meibomian gland dysfunction    Conjunctiva/Sclera Nasal/temp pinguecula Nasal/temp pinguecula    Cornea Clear Clear    Anterior Chamber Deep and quiet Deep and quiet    Iris Normal Normal

## 2018-08-08 ENCOUNTER — OFFICE VISIT (OUTPATIENT)
Dept: INTERNAL MEDICINE CLINIC | Facility: CLINIC | Age: 63
End: 2018-08-08
Payer: COMMERCIAL

## 2018-08-08 VITALS
WEIGHT: 315 LBS | TEMPERATURE: 99 F | HEART RATE: 76 BPM | SYSTOLIC BLOOD PRESSURE: 132 MMHG | BODY MASS INDEX: 45.1 KG/M2 | DIASTOLIC BLOOD PRESSURE: 72 MMHG | HEIGHT: 70 IN

## 2018-08-08 DIAGNOSIS — I10 ESSENTIAL HYPERTENSION: Primary | ICD-10-CM

## 2018-08-08 PROCEDURE — 99213 OFFICE O/P EST LOW 20 MIN: CPT | Performed by: INTERNAL MEDICINE

## 2018-08-08 PROCEDURE — 99212 OFFICE O/P EST SF 10 MIN: CPT | Performed by: INTERNAL MEDICINE

## 2018-08-08 NOTE — PROGRESS NOTES
Patient ID: Clement Arndt is a 59year old male. Patient presents with:  HTN: 2 month follow up       HISTORY OF PRESENT ILLNESS:   HPI  Patient resents for above. Here for a blood pressure follow-up. Taking medications as prescribed.   Trying MUSCLE SPASMS, Disp: , Rfl: 3  •  METOPROLOL SUCCINATE ER 25 MG Oral Tablet 24 Hr, TAKE 1 TABLET (25 MG TOTAL) BY MOUTH DAILY. , Disp: 90 tablet, Rfl: 0  •  Pitavastatin Calcium (LIVALO) 4 MG Oral Tab, TAKE 1 TABLET BY MOUTH EVERY EVENING, Disp: 90 tablet, normal and breath sounds normal. No respiratory distress. Abdominal: Soft. Bowel sounds are normal.   Neurological: He is alert and oriented to person, place, and time. Psychiatric: He has a normal mood and affect. ASSESSMENT/PLAN:   1.  Sheryl

## 2018-08-08 NOTE — PATIENT INSTRUCTIONS
Low-Salt Diet  This diet removes foods that are high in salt. It also limits the amount of salt you use when cooking. It is most often used for people with high blood pressure, edema (fluid retention), and kidney, liver, or heart disease.   Table salt con Avoid: Flavored coffees, electrolyte replacement drinks, sports drinks  Meats  Ok: All fresh meat, fish, poultry, low-salt tuna, eggs, egg substitute  Avoid: Smoked, pickled, brine-cured, or salted meats and fish.  This includes philip, chipped beef, corned

## 2018-08-09 ENCOUNTER — OFFICE VISIT (OUTPATIENT)
Dept: SURGERY | Facility: CLINIC | Age: 63
End: 2018-08-09
Payer: COMMERCIAL

## 2018-08-09 VITALS
OXYGEN SATURATION: 98 % | RESPIRATION RATE: 16 BRPM | HEIGHT: 70 IN | DIASTOLIC BLOOD PRESSURE: 72 MMHG | BODY MASS INDEX: 45.1 KG/M2 | HEART RATE: 78 BPM | WEIGHT: 315 LBS | SYSTOLIC BLOOD PRESSURE: 130 MMHG

## 2018-08-09 DIAGNOSIS — E66.01 OBESITY, CLASS III, BMI 40-49.9 (MORBID OBESITY) (HCC): Primary | ICD-10-CM

## 2018-08-09 DIAGNOSIS — E78.5 DYSLIPIDEMIA: ICD-10-CM

## 2018-08-09 DIAGNOSIS — M17.0 PRIMARY OSTEOARTHRITIS OF BOTH KNEES: ICD-10-CM

## 2018-08-09 DIAGNOSIS — I10 ESSENTIAL HYPERTENSION: ICD-10-CM

## 2018-08-09 DIAGNOSIS — E11.9 TYPE 2 DIABETES MELLITUS WITHOUT COMPLICATION, WITHOUT LONG-TERM CURRENT USE OF INSULIN (HCC): ICD-10-CM

## 2018-08-09 PROCEDURE — 99213 OFFICE O/P EST LOW 20 MIN: CPT | Performed by: NURSE PRACTITIONER

## 2018-08-09 NOTE — PATIENT INSTRUCTIONS
Goals for next month:  1. Keep a food log using [facility]  2. Drink 48-64 ounces of water per day. No fruit juices or regular soda. 3. Increase aerobic exercises (goal is 150 minutes per week)  4. Increase fruit and vegetable servings/day  5.  Keep carbs at or b

## 2018-08-09 NOTE — PROGRESS NOTES
[address] [place] [address] Brenda Carcamo, [address] [address] [address], [address]  [address]  Dept: [phone]     Date:   18    Patient:  Kareem Oates  :      1955  MRN:      [id number]    Chief 100 strip Rfl: 1   ONETOUCH LANCETS Does not apply Misc Test twice daily prn Disp: 100 each Rfl: 3     Allergies:  Lamisil [Terbinafine]     Social History:      Social History  Social History   Marital status:   Spouse name: N/A    Years of educati per day:  <60 oz  · Drinking between meals only:  no  · Toughest challenge:  exercise    Nutritional Goals  Limit carbohydrates to 100 gms per day, Eat 100-200 calories within 1 hour of waking  and Eat 3-4 cups of fresh fruits or vegetables daily    [employer] desires to pursue traditional weight loss at this time.       HYPERTENSION: Blood pressure stable on the above medications.  No interval change in antihypertensive medication; per Dr. Gabrielle Hernandez notes- has hx of non-compliance with BP meds     DYSLIPIDEMIA: St

## 2018-08-14 ENCOUNTER — ANESTHESIA (OUTPATIENT)
Dept: MRI IMAGING | Facility: HOSPITAL | Age: 63
End: 2018-08-14

## 2018-08-14 ENCOUNTER — ANESTHESIA EVENT (OUTPATIENT)
Dept: MRI IMAGING | Facility: HOSPITAL | Age: 63
End: 2018-08-14

## 2018-08-14 ENCOUNTER — HOSPITAL ENCOUNTER (OUTPATIENT)
Dept: MRI IMAGING | Facility: HOSPITAL | Age: 63
Discharge: HOME | End: 2018-08-14
Attending: NURSE PRACTITIONER
Payer: COMMERCIAL

## 2018-08-14 VITALS
SYSTOLIC BLOOD PRESSURE: 145 MMHG | DIASTOLIC BLOOD PRESSURE: 80 MMHG | OXYGEN SATURATION: 96 % | BODY MASS INDEX: 45.1 KG/M2 | RESPIRATION RATE: 18 BRPM | HEART RATE: 96 BPM | TEMPERATURE: 98 F | WEIGHT: 315 LBS | HEIGHT: 70 IN

## 2018-08-14 DIAGNOSIS — M54.50 LOW BACK PAIN: ICD-10-CM

## 2018-08-14 DIAGNOSIS — M54.16 LUMBAR RADICULOPATHY: ICD-10-CM

## 2018-08-14 LAB
GLUCOSE BLDC GLUCOMTR-MCNC: 101 MG/DL (ref 70–99)
GLUCOSE BLDC GLUCOMTR-MCNC: 104 MG/DL (ref 70–99)

## 2018-08-14 PROCEDURE — 99212 OFFICE O/P EST SF 10 MIN: CPT | Performed by: HOSPITALIST

## 2018-08-14 RX ORDER — DEXAMETHASONE SODIUM PHOSPHATE 4 MG/ML
VIAL (ML) INJECTION AS NEEDED
Status: DISCONTINUED | OUTPATIENT
Start: 2018-08-14 | End: 2018-08-14 | Stop reason: SURG

## 2018-08-14 RX ORDER — ROCURONIUM BROMIDE 10 MG/ML
INJECTION, SOLUTION INTRAVENOUS AS NEEDED
Status: DISCONTINUED | OUTPATIENT
Start: 2018-08-14 | End: 2018-08-14 | Stop reason: SURG

## 2018-08-14 RX ORDER — NALOXONE HYDROCHLORIDE 0.4 MG/ML
80 INJECTION, SOLUTION INTRAMUSCULAR; INTRAVENOUS; SUBCUTANEOUS AS NEEDED
Status: ACTIVE | OUTPATIENT
Start: 2018-08-14 | End: 2018-08-14

## 2018-08-14 RX ORDER — GLYCOPYRROLATE 0.2 MG/ML
INJECTION INTRAMUSCULAR; INTRAVENOUS AS NEEDED
Status: DISCONTINUED | OUTPATIENT
Start: 2018-08-14 | End: 2018-08-14 | Stop reason: SURG

## 2018-08-14 RX ORDER — ACETAMINOPHEN 500 MG
1000 TABLET ORAL ONCE
Status: DISCONTINUED | OUTPATIENT
Start: 2018-08-14 | End: 2018-08-16

## 2018-08-14 RX ORDER — ONDANSETRON 2 MG/ML
4 INJECTION INTRAMUSCULAR; INTRAVENOUS ONCE AS NEEDED
Status: ACTIVE | OUTPATIENT
Start: 2018-08-14 | End: 2018-08-14

## 2018-08-14 RX ORDER — HYDROCODONE BITARTRATE AND ACETAMINOPHEN 5; 325 MG/1; MG/1
1 TABLET ORAL AS NEEDED
Status: DISCONTINUED | OUTPATIENT
Start: 2018-08-14 | End: 2018-08-16

## 2018-08-14 RX ORDER — METOPROLOL TARTRATE 5 MG/5ML
2.5 INJECTION INTRAVENOUS ONCE
Status: DISCONTINUED | OUTPATIENT
Start: 2018-08-14 | End: 2018-08-16

## 2018-08-14 RX ORDER — MORPHINE SULFATE 2 MG/ML
2 INJECTION, SOLUTION INTRAMUSCULAR; INTRAVENOUS EVERY 10 MIN PRN
Status: DISCONTINUED | OUTPATIENT
Start: 2018-08-14 | End: 2018-08-16

## 2018-08-14 RX ORDER — HYDROCODONE BITARTRATE AND ACETAMINOPHEN 5; 325 MG/1; MG/1
2 TABLET ORAL AS NEEDED
Status: DISCONTINUED | OUTPATIENT
Start: 2018-08-14 | End: 2018-08-16

## 2018-08-14 RX ORDER — METOCLOPRAMIDE 10 MG/1
10 TABLET ORAL ONCE
Status: DISCONTINUED | OUTPATIENT
Start: 2018-08-14 | End: 2018-08-16

## 2018-08-14 RX ORDER — MORPHINE SULFATE 10 MG/ML
6 INJECTION, SOLUTION INTRAMUSCULAR; INTRAVENOUS EVERY 10 MIN PRN
Status: DISCONTINUED | OUTPATIENT
Start: 2018-08-14 | End: 2018-08-16

## 2018-08-14 RX ORDER — MORPHINE SULFATE 4 MG/ML
4 INJECTION, SOLUTION INTRAMUSCULAR; INTRAVENOUS EVERY 10 MIN PRN
Status: DISCONTINUED | OUTPATIENT
Start: 2018-08-14 | End: 2018-08-16

## 2018-08-14 RX ORDER — SODIUM CHLORIDE, SODIUM LACTATE, POTASSIUM CHLORIDE, CALCIUM CHLORIDE 600; 310; 30; 20 MG/100ML; MG/100ML; MG/100ML; MG/100ML
INJECTION, SOLUTION INTRAVENOUS CONTINUOUS
Status: DISCONTINUED | OUTPATIENT
Start: 2018-08-14 | End: 2018-08-16

## 2018-08-14 RX ORDER — DEXTROSE MONOHYDRATE 25 G/50ML
50 INJECTION, SOLUTION INTRAVENOUS
Status: DISCONTINUED | OUTPATIENT
Start: 2018-08-14 | End: 2018-08-16

## 2018-08-14 RX ORDER — ONDANSETRON 2 MG/ML
INJECTION INTRAMUSCULAR; INTRAVENOUS AS NEEDED
Status: DISCONTINUED | OUTPATIENT
Start: 2018-08-14 | End: 2018-08-14 | Stop reason: SURG

## 2018-08-14 RX ORDER — LIDOCAINE HYDROCHLORIDE 10 MG/ML
INJECTION, SOLUTION EPIDURAL; INFILTRATION; INTRACAUDAL; PERINEURAL AS NEEDED
Status: DISCONTINUED | OUTPATIENT
Start: 2018-08-14 | End: 2018-08-14 | Stop reason: SURG

## 2018-08-14 RX ORDER — FAMOTIDINE 20 MG/1
20 TABLET ORAL ONCE
Status: DISCONTINUED | OUTPATIENT
Start: 2018-08-14 | End: 2018-08-16

## 2018-08-14 RX ADMIN — SODIUM CHLORIDE, SODIUM LACTATE, POTASSIUM CHLORIDE, CALCIUM CHLORIDE: 600; 310; 30; 20 INJECTION, SOLUTION INTRAVENOUS at 14:18:00

## 2018-08-14 RX ADMIN — ONDANSETRON 4 MG: 2 INJECTION INTRAMUSCULAR; INTRAVENOUS at 13:48:00

## 2018-08-14 RX ADMIN — DEXAMETHASONE SODIUM PHOSPHATE 4 MG: 4 MG/ML VIAL (ML) INJECTION at 13:48:00

## 2018-08-14 RX ADMIN — ROCURONIUM BROMIDE 10 MG: 10 INJECTION, SOLUTION INTRAVENOUS at 13:43:00

## 2018-08-14 RX ADMIN — GLYCOPYRROLATE 0.2 MG: 0.2 INJECTION INTRAMUSCULAR; INTRAVENOUS at 13:48:00

## 2018-08-14 RX ADMIN — LIDOCAINE HYDROCHLORIDE 50 MG: 10 INJECTION, SOLUTION EPIDURAL; INFILTRATION; INTRACAUDAL; PERINEURAL at 13:43:00

## 2018-08-14 RX ADMIN — SODIUM CHLORIDE, SODIUM LACTATE, POTASSIUM CHLORIDE, CALCIUM CHLORIDE: 600; 310; 30; 20 INJECTION, SOLUTION INTRAVENOUS at 13:37:00

## 2018-08-14 RX ADMIN — SODIUM CHLORIDE, SODIUM LACTATE, POTASSIUM CHLORIDE, CALCIUM CHLORIDE: 600; 310; 30; 20 INJECTION, SOLUTION INTRAVENOUS at 12:49:00

## 2018-08-14 NOTE — H&P
[facility] Patient Status:  Outpatient    1955 MRN [id number]   Location [facility] MRI Attending Anahi Wang NP   Hosp Day # 0 PCP Tavia Raymond MD     Date:  2018 8/14/2016  Smokeless tobacco: Former User                     Alcohol use: Yes           0.0 oz/week     Comment: Weekly, 4 Beers; Allergies/Medications:    Allergies:   Lamisil [Terbinafin*    SWELLING    (Not in a hospital admission)    Review of Syst

## 2018-08-14 NOTE — ANESTHESIA PROCEDURE NOTES
Airway  Urgency: elective      General Information and Staff    Patient location during procedure: OR  Anesthesiologist: Duane Chan  Resident/CRNA: Estelita Luis  Performed: anesthesiologist and CRNA     Indications and Patient Condition  Indicat

## 2018-08-14 NOTE — ANESTHESIA POSTPROCEDURE EVALUATION
Patient: Errol Molina    Procedure Summary     Date:  08/14/18 Room / Location:  [facility] MRI; [address] [facility] Anesthesia Care Unit    Anesthesia Start:  7928 Anesthesia Stop:  5588    Procedure:  MRI SPINE LUMBAR (CPT=72148) Diag

## 2018-08-14 NOTE — ANESTHESIA PREPROCEDURE EVALUATION
Anesthesia PreOp Note    HPI:     Marcela Cole is a 59year old male who presents for preoperative consultation requested by: * No surgeons listed *    Date of Surgery: 8/14/2018    * No procedures listed *  Indication: * No pre-op diagnosis ente Outpatient Prescriptions Ordered in Epic:  Cyclobenzaprine HCl 10 MG Oral Tab TAKE 1 TABLET(S) BY MOUTH EVERY 8 HOURS AS NEEDED FOR MUSCLE SPASMS Disp:  Rfl: 3   METOPROLOL SUCCINATE ER 25 MG Oral Tablet 24 Hr TAKE 1 TABLET (25 MG TOTAL) BY MOUTH DAILY.  Rhiannon Mcdonald Comment: Weekly, 4 Beers;     Drug use: No    Sexual activity: Not on file     Other Topics Concern    Caffeine Concern Yes    Comment: Coffee, 2 cups daily; Social History Narrative   None on file       Available pre-op labs reviewed.        Lab Resul HERO BOURGEOIS  8/14/2018 1:35 PM

## 2018-08-22 ENCOUNTER — TELEPHONE (OUTPATIENT)
Dept: PAIN CLINIC | Facility: HOSPITAL | Age: 63
End: 2018-08-22

## 2018-08-30 ENCOUNTER — DOCUMENTATION ONLY (OUTPATIENT)
Dept: PAIN CLINIC | Facility: HOSPITAL | Age: 63
End: 2018-08-30

## 2018-08-30 ENCOUNTER — OFFICE VISIT (OUTPATIENT)
Dept: PAIN CLINIC | Facility: HOSPITAL | Age: 63
End: 2018-08-30
Attending: ANESTHESIOLOGY
Payer: COMMERCIAL

## 2018-08-30 VITALS
HEART RATE: 79 BPM | SYSTOLIC BLOOD PRESSURE: 148 MMHG | HEIGHT: 70 IN | BODY MASS INDEX: 45.1 KG/M2 | DIASTOLIC BLOOD PRESSURE: 82 MMHG | RESPIRATION RATE: 18 BRPM | WEIGHT: 315 LBS

## 2018-08-30 DIAGNOSIS — M51.36 DEGENERATIVE DISC DISEASE, LUMBAR: Primary | Chronic | ICD-10-CM

## 2018-08-30 DIAGNOSIS — M48.061 SPINAL STENOSIS OF LUMBAR REGION AT MULTIPLE LEVELS: Chronic | ICD-10-CM

## 2018-08-30 PROCEDURE — 99201 HC OUTPT EVAL AND MGNT NEW PT LEVEL 1: CPT

## 2018-08-30 RX ORDER — NAPROXEN SODIUM 220 MG
1-2 TABLET ORAL 2 TIMES DAILY
COMMUNITY

## 2018-08-30 NOTE — PROGRESS NOTES
INITIAL CONSULT:  08/30/18  PRESENTS AMBULATORY USING HIS UP RIGHT WALKER;  ACCOMPANIED BY HIS WIFE;  NEW CONSULT C/O LBP;  PT REPORTS HE WAS A   [id number]-KWOICPCS AROUND IN THE CAB,  LIFTING HEAVY MACHINERY;  \"WEAR & TEAR ON MY BODY\";  PT IS AL

## 2018-08-30 NOTE — CHRONIC PAIN
[facility]  Pain Clinic   New Consult       Patient name: Husam Chappell 59year old male  : 1955  MRN: [id number]  Referring MD: Maricruz Griffin MD    COMPLAINT:  Referred to the pain clinic by Dr. Maricruz Griffin MD for low back Lung cancer   • Hypertension Mother    • Diabetes Brother    • Lipids Other      Family H/o: Hyperlipidemia   • Glaucoma Neg      SOCIAL HISTORY:     Smoking status: Current Some Day Smoker  1.50 Packs/day  For 30.00 Years     Last attempt to quit: 8/1 Ht 5' 10\" (1.778 m)   Wt (!) 327 lb (148.3 kg)   BMI 46.92 kg/m²   General: Alert and oriented x3, NAD, appears stated age, appropriate disposition and demeanor, answers questions appropriately   Head: normocephalic, atraumatic  Eyes: anicteric; no injec Ligamentum flavum redundancy with bilateral facet arthropathy/hypertrophy. Minimal spinal canal stenosis. Moderate to severe left   and moderate right lateral recess stenosis.  Moderate to severe left greater than right neural foraminal stenosis.     ===== a comprehensive interactive discussion. All questions were answered during extended questions and answer session. Patient agreeable to discussion plan.  Greater than 50% of the time was spent with counseling (nature of discussion centered around pain, thera

## 2018-09-12 ENCOUNTER — DOCUMENTATION ONLY (OUTPATIENT)
Dept: PAIN CLINIC | Facility: HOSPITAL | Age: 63
End: 2018-09-12

## 2018-09-13 ENCOUNTER — TELEPHONE (OUTPATIENT)
Dept: INTERNAL MEDICINE CLINIC | Facility: CLINIC | Age: 63
End: 2018-09-13

## 2018-09-13 NOTE — TELEPHONE ENCOUNTER
Patient states that Dr. Krissy Marie ordered Temp handicapped placard for the patient and its expiring needs another one ordered     The placard will be  at the end of the month

## 2018-09-18 NOTE — TELEPHONE ENCOUNTER
Patient informed form is completed and ready for  at the [facility] second floor. Patient verbalized understanding.

## 2018-09-25 ENCOUNTER — OFFICE VISIT (OUTPATIENT)
Dept: SURGERY | Facility: CLINIC | Age: 63
End: 2018-09-25
Payer: COMMERCIAL

## 2018-09-25 VITALS
RESPIRATION RATE: 16 BRPM | HEIGHT: 70 IN | DIASTOLIC BLOOD PRESSURE: 70 MMHG | OXYGEN SATURATION: 96 % | SYSTOLIC BLOOD PRESSURE: 120 MMHG | BODY MASS INDEX: 45.1 KG/M2 | HEART RATE: 84 BPM | WEIGHT: 315 LBS

## 2018-09-25 DIAGNOSIS — E78.5 DYSLIPIDEMIA: ICD-10-CM

## 2018-09-25 DIAGNOSIS — E11.9 TYPE 2 DIABETES MELLITUS WITHOUT COMPLICATION, WITHOUT LONG-TERM CURRENT USE OF INSULIN (HCC): ICD-10-CM

## 2018-09-25 DIAGNOSIS — I10 ESSENTIAL HYPERTENSION: ICD-10-CM

## 2018-09-25 DIAGNOSIS — E66.01 OBESITY, CLASS III, BMI 40-49.9 (MORBID OBESITY) (HCC): Primary | ICD-10-CM

## 2018-09-25 DIAGNOSIS — M17.0 PRIMARY OSTEOARTHRITIS OF BOTH KNEES: ICD-10-CM

## 2018-09-25 PROCEDURE — 99213 OFFICE O/P EST LOW 20 MIN: CPT | Performed by: NURSE PRACTITIONER

## 2018-09-25 NOTE — PROGRESS NOTES
[address] [place] [address] Lilly Dahl, [address] [address] [address]  [address]  Dept: [phone]     Date:   18    Patient:  Ricardo Meyers  :      1955  MRN:      [id number]    Chief Cream Apply 1 Application topically daily.  Disp: 1 Tube Rfl: 1   ONETOUCH ULTRA BLUE In Vitro Strip TEST TWICE DAILY AS NEEDED Disp: 100 strip Rfl: 1   ONETOUCH LANCETS Does not apply Misc Test twice daily prn Disp: 100 each Rfl: 3     Allergies:  Lamisil ago  11/29/2017: COLONOSCOPY; N/A      Comment:  Procedure: COLONOSCOPY;  Surgeon: Manda Veronica MD;  Location: [facility]  11/29/2017: COLONOSCOPY; N/A      Comment:  Performed by Manda Veronica MD at [facility]  10/17/ complete each meal    Exercise Goals Reviewed and Discussed    Chair exercises for  30 Minutes    ROS:    Review of Systems   Constitutional: Negative. Respiratory: Negative. Cardiovascular: Negative. Gastrointestinal: Negative.     Genitourinary: pain: has seen Ortho    Increase water intake    Limit/avoid starchy carbs    Goals for next month:  1. Keep a food log using [facility]  2. Drink 48-64 ounces of water per day. No fruit juices or regular soda.   3. Increase aerobic exercises (goal is 150 minutes

## 2018-09-25 NOTE — PATIENT INSTRUCTIONS
Goals for next month:  1. Keep a food log using [facility]  2. Drink 48-64 ounces of water per day. No fruit juices or regular soda. 3. Increase aerobic exercises (goal is 150 minutes per week)  4. Increase fruit and vegetable servings/day  5.  Keep carbs at or b

## 2018-10-01 RX ORDER — METOPROLOL SUCCINATE 25 MG/1
25 TABLET, EXTENDED RELEASE ORAL DAILY
Qty: 90 TABLET | Refills: 0 | Status: SHIPPED | OUTPATIENT
Start: 2018-10-01 | End: 2018-12-29

## 2018-10-01 NOTE — TELEPHONE ENCOUNTER
Refill protocol failed because the patient did not meet the protocol criteria.     Hypertensive Medications  Protocol Criteria:  · Appointment scheduled in the past 6 months or in the next 3 months  · BMP or CMP in the past 12 months  · Creatinine result < 287 08/07/2017

## 2018-10-05 NOTE — TELEPHONE ENCOUNTER
Requested Prescriptions     Pending Prescriptions Disp Refills   • MetFORMIN HCl 500 MG Oral Tab 180 tablet 3     Sig: TAKE 1 TABLET (500 MG TOTAL) BY MOUTH 2 (TWO) TIMES DAILY WITH MEALS.        Last Office Visit with PCP: 8/8/2018  Last Blood Pressures:

## 2018-10-10 ENCOUNTER — DOCUMENTATION ONLY (OUTPATIENT)
Dept: PAIN CLINIC | Facility: HOSPITAL | Age: 63
End: 2018-10-10

## 2018-10-10 ENCOUNTER — OFFICE VISIT (OUTPATIENT)
Dept: PAIN CLINIC | Facility: HOSPITAL | Age: 63
End: 2018-10-10
Attending: NURSE PRACTITIONER
Payer: COMMERCIAL

## 2018-10-10 DIAGNOSIS — M48.061 SPINAL STENOSIS OF LUMBAR REGION AT MULTIPLE LEVELS: Primary | Chronic | ICD-10-CM

## 2018-10-10 DIAGNOSIS — M51.36 DEGENERATIVE DISC DISEASE, LUMBAR: Chronic | ICD-10-CM

## 2018-10-10 PROCEDURE — 99211 OFF/OP EST MAY X REQ PHY/QHP: CPT

## 2018-10-10 NOTE — CHRONIC PAIN
Follow-up Note  CC: S/P LESI L4-L5 9/14/18  HISTORY OF PRESENT ILLNESS:  Kiran Clements is a 59year old old male, originally referred to the pain clinic by Jeff Davila, with history of Spinal stenosis of lumbar region at multiple levels  (primary enc 100 each Rfl: 3        REVIEW OF SYSTEMS:   Bowel/Bladder Incontinence: as above  Coughing/sneezing/straining does not exacerbate the pain.   Numbness/tingling: as above  Weakness: as above  Weight Loss: Negative   Fever: Negative   Cardiovascular:  No curr REPLACEMENT SURGERY     • KNEE TOTAL REPLACEMENT Right 6/12/2017    Performed by Cait Negron MD at [facility] OR   • TOTAL KNEE REPLACEMENT      R Total Knee june 12, 2017       FAMILY HISTORY:  Family History   Problem Relation Age of Onset   • Heart NOT discussed. PHYSICAL EXAMINATION:  There were no vitals filed for this visit.    General: Alert and oriented x3, NAD, appears stated age, appropriate disposition and demeanor, answers questions appropriately   Head: normocephalic, atraumatic  Eyes: anic Risks and benefits of all options were discussed at length to patients satisfaction during a comprehensive interactive discussion. All questions were answered during extended questions and answer session. Patient agreeable to discussion plan.  Greater than

## 2018-10-10 NOTE — PROGRESS NOTES
Patient presents to [facility] ambulatory for follow up MICHAEL done 9-14. He reports 100% relief for 1 1/2 weeks. Now the pain has returned. When he is sitting he has no pain at all. His pain is worse when standing and walking.  He would like to have another inj

## 2018-10-11 ENCOUNTER — DOCUMENTATION ONLY (OUTPATIENT)
Dept: PAIN CLINIC | Facility: HOSPITAL | Age: 63
End: 2018-10-11

## 2018-10-21 NOTE — ASSESSMENT & PLAN NOTE
I advised patient that there is no background diabetic retinopathy in either eye and that they should continue to keep their blood sugar under control and continue to see their physician as directed. I stressed the importance of yearly diabetic eye exams. patient representative

## 2018-10-30 ENCOUNTER — OFFICE VISIT (OUTPATIENT)
Dept: PAIN CLINIC | Facility: HOSPITAL | Age: 63
End: 2018-10-30
Attending: NURSE PRACTITIONER
Payer: COMMERCIAL

## 2018-10-30 DIAGNOSIS — M48.061 SPINAL STENOSIS OF LUMBAR REGION AT MULTIPLE LEVELS: Primary | ICD-10-CM

## 2018-10-30 PROCEDURE — 99211 OFF/OP EST MAY X REQ PHY/QHP: CPT

## 2018-10-30 NOTE — PROGRESS NOTES
Patient presents to [facility] ambulatory for follow up MICHAEL. He reports 50% relief. He is able to tolerate activity for longer periods of time. He has no pain when sitting but with walking too long it can get up to a 8. RONI Klein in to see patient.  See provi

## 2018-10-30 NOTE — CHRONIC PAIN
Follow-up Note  HISTORY OF PRESENT ILLNESS:  Cathleen Escudero is a 59year old old male, originally referred to the pain clinic by Dr. Kam Escalera, with history oHistory of spinal stenosis of lumbar region at multiple levels returns to the clinic for lj pain or palpitations   Respiratory:  No current shortness of breath   Gastrointestinal:  No active ulcer  Genitourinary:  Negative  Integumentary :  Negative  Psychiatric:  Negative  Hematologic: No active bleeding  Lymphatic: No current lymphedema  Allerg Father    • Cancer Mother         Lung cancer   • Hypertension Mother    • Diabetes Brother    • Lipids Other         Family H/o: Hyperlipidemia   • Glaucoma Neg        SOCIAL HISTORY:  Social History    Socioeconomic History      Marital status:  normocephalic, atraumatic  Eyes: anicteric; no injection  Ears: no obvious deformities noted   Nose: externally grossly within normal limits, no unusual discharge or rhinorrhea   Throat: lips grossly within normal limits by visual exam externally  Neck: scott discussion centered around pain, therapy, and treatment options), face to face time, time spent reviewing data, obtaining patient information and discussing the care with the patients health care providers.

## 2018-11-07 ENCOUNTER — OFFICE VISIT (OUTPATIENT)
Dept: SURGERY | Facility: CLINIC | Age: 63
End: 2018-11-07
Payer: COMMERCIAL

## 2018-11-07 VITALS
BODY MASS INDEX: 45.1 KG/M2 | SYSTOLIC BLOOD PRESSURE: 150 MMHG | WEIGHT: 315 LBS | HEART RATE: 88 BPM | OXYGEN SATURATION: 94 % | DIASTOLIC BLOOD PRESSURE: 92 MMHG | RESPIRATION RATE: 18 BRPM | HEIGHT: 70 IN

## 2018-11-07 DIAGNOSIS — M17.0 PRIMARY OSTEOARTHRITIS OF BOTH KNEES: ICD-10-CM

## 2018-11-07 DIAGNOSIS — I10 ESSENTIAL HYPERTENSION: Primary | ICD-10-CM

## 2018-11-07 DIAGNOSIS — E11.9 TYPE 2 DIABETES MELLITUS WITHOUT COMPLICATION, WITHOUT LONG-TERM CURRENT USE OF INSULIN (HCC): ICD-10-CM

## 2018-11-07 DIAGNOSIS — R73.09 ABNORMAL BLOOD SUGAR: ICD-10-CM

## 2018-11-07 DIAGNOSIS — E66.01 OBESITY, CLASS III, BMI 40-49.9 (MORBID OBESITY) (HCC): ICD-10-CM

## 2018-11-07 DIAGNOSIS — E55.9 VITAMIN D DEFICIENCY: ICD-10-CM

## 2018-11-07 PROCEDURE — 99214 OFFICE O/P EST MOD 30 MIN: CPT | Performed by: INTERNAL MEDICINE

## 2018-11-07 NOTE — PROGRESS NOTES
[address] [place] [address] Rekha Galicia, [address] [address] [address]  [address]  Dept: [phone]     Date:   18    Patient:  Lisa Ye  :      1955  MRN:      [id number]    Chief 1 TABLET(S) BY MOUTH EVERY 8 HOURS AS NEEDED FOR MUSCLE SPASMS Disp:  Rfl: 3   Pitavastatin Calcium (LIVALO) 4 MG Oral Tab TAKE 1 TABLET BY MOUTH EVERY EVENING Disp: 90 tablet Rfl: 3   Luliconazole (LUZU) 1 % External Cream Apply 1 Application topically da Not on file    Surgical History:    Past Surgical History:   Procedure Laterality Date   • ARTHROSCOPY OF JOINT UNLISTED Left 2013   • ARTHROSCOPY OF JOINT UNLISTED Right    • COLONOSCOPY      last colonoscopy 10 yrs ago   • COLONOSCOPY N/A 11/29/2017    P portion control strategies to reduce calorie intake, Identify triggers for eating and manage cues and Eat slowly and take 20 to 30 minutes to complete each meal    Exercise Goals Reviewed and Discussed    Chair exercises for  30 Minutes    ROS:    Review o 09:06 AM   TRIG 143 08/07/2017 09:06 AM         DM: on Metformin; last A1c was <5.7%; sugars at home range from 109-265     BRYCE: does not tolerate machine     Bilateral knee pain: has seen Ortho    Increase water intake    Limit/avoid starchy carbs    Goal

## 2018-11-08 ENCOUNTER — OFFICE VISIT (OUTPATIENT)
Dept: INTERNAL MEDICINE CLINIC | Facility: CLINIC | Age: 63
End: 2018-11-08
Payer: COMMERCIAL

## 2018-11-08 VITALS
DIASTOLIC BLOOD PRESSURE: 78 MMHG | TEMPERATURE: 99 F | HEART RATE: 80 BPM | HEIGHT: 70 IN | BODY MASS INDEX: 45.1 KG/M2 | SYSTOLIC BLOOD PRESSURE: 127 MMHG | WEIGHT: 315 LBS

## 2018-11-08 DIAGNOSIS — E66.01 OBESITY, CLASS III, BMI 40-49.9 (MORBID OBESITY) (HCC): ICD-10-CM

## 2018-11-08 DIAGNOSIS — I10 ESSENTIAL HYPERTENSION: Primary | ICD-10-CM

## 2018-11-08 DIAGNOSIS — Z23 NEED FOR VACCINATION: ICD-10-CM

## 2018-11-08 PROCEDURE — 90471 IMMUNIZATION ADMIN: CPT | Performed by: INTERNAL MEDICINE

## 2018-11-08 PROCEDURE — 99214 OFFICE O/P EST MOD 30 MIN: CPT | Performed by: INTERNAL MEDICINE

## 2018-11-08 PROCEDURE — 90686 IIV4 VACC NO PRSV 0.5 ML IM: CPT | Performed by: INTERNAL MEDICINE

## 2018-11-08 PROCEDURE — 99212 OFFICE O/P EST SF 10 MIN: CPT | Performed by: INTERNAL MEDICINE

## 2018-11-08 NOTE — PROGRESS NOTES
Patient ID: Jim Cabrera is a 59year old male. Patient presents with:  HTN: 3 month f/u. Is currently seeing Dr. Martha Coleman for weight loss. Flu vaccine today        HISTORY OF PRESENT ILLNESS:   HPI  Patient presents for above.   Here for follow-u [facility] MAIN OR   • KNEE REPLACEMENT SURGERY     • KNEE TOTAL REPLACEMENT Right 6/12/2017    Performed by Ismael Manzo MD at [address] [facility] [address]   • TOTAL KNEE REPLACEMENT      R Total Knee june 12, 2017         Current Outpatient Medications:   •  Liraglutide ( file    Occupational History      Not on file    Tobacco Use      Smoking status: Current Some Day Smoker        Packs/day: 1.50        Years: 30.00        Pack years: 39        Quit date: 2016        Years since quittin.2      Smokeless tobacco: diet.    2. Obesity, Class III, BMI 40-49.9 (morbid obesity) ([facility] [address]  · Follow-up with bariatric specialist.  · Diet and exercise.     3. Need for vaccination  · FLULAVAL INFLUENZA VACCINE QUAD PRESERVATIVE FREE 0.5 ML    Return in about 3 months (around 2/8/

## 2018-11-12 ENCOUNTER — LAB ENCOUNTER (OUTPATIENT)
Dept: LAB | Age: 63
End: 2018-11-12
Attending: INTERNAL MEDICINE
Payer: COMMERCIAL

## 2018-11-12 DIAGNOSIS — E11.9 TYPE 2 DIABETES MELLITUS WITHOUT COMPLICATION, WITHOUT LONG-TERM CURRENT USE OF INSULIN (HCC): ICD-10-CM

## 2018-11-12 DIAGNOSIS — E66.01 OBESITY, CLASS III, BMI 40-49.9 (MORBID OBESITY) (HCC): ICD-10-CM

## 2018-11-12 DIAGNOSIS — I10 ESSENTIAL HYPERTENSION: ICD-10-CM

## 2018-11-12 DIAGNOSIS — M17.0 PRIMARY OSTEOARTHRITIS OF BOTH KNEES: ICD-10-CM

## 2018-11-12 DIAGNOSIS — R73.09 ABNORMAL BLOOD SUGAR: ICD-10-CM

## 2018-11-12 DIAGNOSIS — E55.9 VITAMIN D DEFICIENCY: ICD-10-CM

## 2018-11-12 PROCEDURE — 80053 COMPREHEN METABOLIC PANEL: CPT

## 2018-11-12 PROCEDURE — 82397 CHEMILUMINESCENT ASSAY: CPT

## 2018-11-12 PROCEDURE — 85025 COMPLETE CBC W/AUTO DIFF WBC: CPT

## 2018-11-12 PROCEDURE — 84403 ASSAY OF TOTAL TESTOSTERONE: CPT

## 2018-11-12 PROCEDURE — 80061 LIPID PANEL: CPT

## 2018-11-12 PROCEDURE — 83036 HEMOGLOBIN GLYCOSYLATED A1C: CPT

## 2018-11-12 PROCEDURE — 82306 VITAMIN D 25 HYDROXY: CPT

## 2018-11-12 PROCEDURE — 36415 COLL VENOUS BLD VENIPUNCTURE: CPT

## 2018-11-12 PROCEDURE — 84443 ASSAY THYROID STIM HORMONE: CPT

## 2018-11-14 ENCOUNTER — TELEPHONE (OUTPATIENT)
Dept: SURGERY | Facility: CLINIC | Age: 63
End: 2018-11-14

## 2018-11-14 RX ORDER — ERGOCALCIFEROL (VITAMIN D2) 1250 MCG
50000 CAPSULE ORAL WEEKLY
Qty: 12 CAPSULE | Refills: 1 | Status: SHIPPED | OUTPATIENT
Start: 2018-11-14 | End: 2019-05-20

## 2018-11-19 ENCOUNTER — TELEPHONE (OUTPATIENT)
Dept: SURGERY | Facility: CLINIC | Age: 63
End: 2018-11-19

## 2018-11-19 NOTE — TELEPHONE ENCOUNTER
Called patient to discuss blood work    Went over A1C    Currently on Victoza and blood sugars are improving    Would recommend starting topiramate as well, but wants to hold off till next visit.     Follow up with PCP as scheduled

## 2018-12-04 DIAGNOSIS — R79.89 LOW TESTOSTERONE IN MALE: Primary | ICD-10-CM

## 2018-12-07 ENCOUNTER — OFFICE VISIT (OUTPATIENT)
Dept: SURGERY | Facility: CLINIC | Age: 63
End: 2018-12-07
Payer: COMMERCIAL

## 2018-12-07 VITALS
SYSTOLIC BLOOD PRESSURE: 150 MMHG | HEIGHT: 70 IN | OXYGEN SATURATION: 95 % | BODY MASS INDEX: 45.1 KG/M2 | RESPIRATION RATE: 18 BRPM | WEIGHT: 315 LBS | HEART RATE: 93 BPM | DIASTOLIC BLOOD PRESSURE: 93 MMHG

## 2018-12-07 DIAGNOSIS — E11.9 TYPE 2 DIABETES MELLITUS WITHOUT COMPLICATION, WITHOUT LONG-TERM CURRENT USE OF INSULIN (HCC): ICD-10-CM

## 2018-12-07 DIAGNOSIS — E66.01 OBESITY, CLASS III, BMI 40-49.9 (MORBID OBESITY) (HCC): ICD-10-CM

## 2018-12-07 DIAGNOSIS — M17.0 PRIMARY OSTEOARTHRITIS OF BOTH KNEES: ICD-10-CM

## 2018-12-07 DIAGNOSIS — I10 ESSENTIAL HYPERTENSION: Primary | ICD-10-CM

## 2018-12-07 DIAGNOSIS — Z51.81 ENCOUNTER FOR THERAPEUTIC DRUG MONITORING: ICD-10-CM

## 2018-12-07 PROCEDURE — 99213 OFFICE O/P EST LOW 20 MIN: CPT | Performed by: INTERNAL MEDICINE

## 2018-12-07 NOTE — PROGRESS NOTES
[address], [address] [place] [address] [facility], [address] [address] [address]  [address]  Dept: [phone]     Date:   18    Patient:  Lakesha Patel  :      1955  MRN:      [id number]    Chief Misc Use a new needle with each use Disp: 30 each Rfl: 3   MetFORMIN HCl 500 MG Oral Tab TAKE 1 TABLET (500 MG TOTAL) BY MOUTH 2 (TWO) TIMES DAILY WITH MEALS.  Disp: 180 tablet Rfl: 3   METOPROLOL SUCCINATE ER 25 MG Oral Tablet 24 Hr TAKE 1 TABLET (25 MG TO Asked        Caffeine Concern: Yes          Coffee, 2 cups daily;          Occupational Exposure: Not Asked        Hobby Hazards: Not Asked        Sleep Concern: Not Asked        Stress Concern: Not Asked        Weight Concern: Not Asked        Special Diet within 1 hour of waking  and Eat 3-4 cups of fresh fruits or vegetables daily    Behavior Modifications Reviewed and Discussed  Eat breakfast, Eat 3 meals per day, Plan meals in advance, Read nutrition labels, Drink 64 oz of water per day, Maintain a daily change in antihypertensive medication; per Dr. Luiz Connors notes- has hx of non-compliance with BP meds     DYSLIPIDEMIA: Stable on the above prescribed meal plan and medication.  Liver function stable.        Lab Results  Component Value Date/Time   CHOLEST 16

## 2018-12-29 RX ORDER — METOPROLOL SUCCINATE 25 MG/1
25 TABLET, EXTENDED RELEASE ORAL DAILY
Qty: 90 TABLET | Refills: 0 | Status: SHIPPED | OUTPATIENT
Start: 2018-12-29 | End: 2019-04-13

## 2018-12-29 NOTE — TELEPHONE ENCOUNTER
Refill passed per [facility] protocol.     Hypertensive Medications  Protocol Criteria:  · Appointment scheduled in the past 6 months or in the next 3 months  · BMP or CMP in the past 12 months  · Creatinine result < 2  Recent Outpatient Visits

## 2019-01-08 ENCOUNTER — OFFICE VISIT (OUTPATIENT)
Dept: ENDOCRINOLOGY CLINIC | Facility: CLINIC | Age: 64
End: 2019-01-08
Payer: COMMERCIAL

## 2019-01-08 VITALS
SYSTOLIC BLOOD PRESSURE: 122 MMHG | BODY MASS INDEX: 47 KG/M2 | WEIGHT: 315 LBS | HEART RATE: 92 BPM | DIASTOLIC BLOOD PRESSURE: 79 MMHG

## 2019-01-08 DIAGNOSIS — Z13.9 SCREENING FOR CONDITION: ICD-10-CM

## 2019-01-08 DIAGNOSIS — E29.1 HYPOGONADISM IN MALE: Primary | ICD-10-CM

## 2019-01-08 PROCEDURE — 99212 OFFICE O/P EST SF 10 MIN: CPT | Performed by: INTERNAL MEDICINE

## 2019-01-08 PROCEDURE — 99243 OFF/OP CNSLTJ NEW/EST LOW 30: CPT | Performed by: INTERNAL MEDICINE

## 2019-01-08 NOTE — H&P
New Patient Evaluation - History and Physical    CONSULT - Reason for Visit:  Hypogoandism  Requesting Physician: Dr. Ayla Pichardo:    Hypogoandism     HISTORY OF PRESENT ILLNESS:   Omar Flores is a 59year old male who presents with H KNEE REPLACEMENT SURGERY     • KNEE TOTAL REPLACEMENT Right 6/12/2017    Performed by De Rich MD at [address] [address]   • TOTAL KNEE REPLACEMENT      R Total Knee june 12, 2017       CURRENT MEDICATIONS:      Current Outpatient Medications:  Jose David Cunha 2.4      Smokeless tobacco: Former User    Substance and Sexual Activity      Alcohol use:  Yes        Alcohol/week: 0.0 oz        Comment: Weekly, 4 Beers;       Drug use: No    Other Topics      Concerns:        Caffeine Concern: Yes          Coffee, 2 cu bleeding, no rashes and no lesions  EXTREMITIES:normal pulses, no edema      Pertinent data reviewed    ASSESSMENT AND PLAN:    Patient is a 59year old male with Hypogonadism. Discussed the following:     The Endocrine Society recommends making a diagnos disease. Also, there is a there is a possible increased risk of cardiovascular disease and strokes associated with testosterone use. Discussed various forms of T replacement  Discussed monitoring on T therapy.      PLAN:  - TT before 10 am   - FSH, LH, PSA

## 2019-01-24 ENCOUNTER — OFFICE VISIT (OUTPATIENT)
Dept: INTERNAL MEDICINE CLINIC | Facility: CLINIC | Age: 64
End: 2019-01-24
Payer: COMMERCIAL

## 2019-01-24 VITALS
WEIGHT: 315 LBS | BODY MASS INDEX: 46.65 KG/M2 | SYSTOLIC BLOOD PRESSURE: 125 MMHG | HEART RATE: 75 BPM | DIASTOLIC BLOOD PRESSURE: 80 MMHG | TEMPERATURE: 98 F | HEIGHT: 69 IN

## 2019-01-24 DIAGNOSIS — E11.9 TYPE 2 DIABETES MELLITUS WITHOUT COMPLICATION, WITHOUT LONG-TERM CURRENT USE OF INSULIN (HCC): ICD-10-CM

## 2019-01-24 DIAGNOSIS — M17.0 PRIMARY OSTEOARTHRITIS OF BOTH KNEES: ICD-10-CM

## 2019-01-24 DIAGNOSIS — Z00.00 ANNUAL PHYSICAL EXAM: Primary | ICD-10-CM

## 2019-01-24 DIAGNOSIS — I10 ESSENTIAL HYPERTENSION: ICD-10-CM

## 2019-01-24 DIAGNOSIS — M48.061 SPINAL STENOSIS OF LUMBAR REGION AT MULTIPLE LEVELS: Chronic | ICD-10-CM

## 2019-01-24 DIAGNOSIS — Z12.11 COLON CANCER SCREENING: ICD-10-CM

## 2019-01-24 DIAGNOSIS — R79.89 LOW TESTOSTERONE IN MALE: ICD-10-CM

## 2019-01-24 DIAGNOSIS — E78.5 DYSLIPIDEMIA: ICD-10-CM

## 2019-01-24 DIAGNOSIS — E66.01 OBESITY, CLASS III, BMI 40-49.9 (MORBID OBESITY) (HCC): ICD-10-CM

## 2019-01-24 PROCEDURE — 99213 OFFICE O/P EST LOW 20 MIN: CPT | Performed by: INTERNAL MEDICINE

## 2019-01-24 PROCEDURE — 99396 PREV VISIT EST AGE 40-64: CPT | Performed by: INTERNAL MEDICINE

## 2019-01-24 NOTE — PROGRESS NOTES
Patient ID: Joaquin Escudero is a 62year old male. Patient presents with:  Physical       HISTORY OF PRESENT ILLNESS:   HPI  Patient presents for above. Here for complete physical.. History of high blood pressure on monotherapy.   Well-controll [facility])    • Ear problems    • High blood pressure    • High cholesterol    • Morbid obesity with BMI of 45.0-49.9, adult (HCC)    • Obesity, unspecified    • Osteoarthritis    • Pulmonary emphysema ([facility]) MILD   • Sleep apnea     does not tolerate machine Tube, Rfl: 1  •  ONETOUCH ULTRA BLUE In Vitro Strip, TEST TWICE DAILY AS NEEDED, Disp: 100 strip, Rfl: 1  •  ONETOUCH LANCETS Does not apply Misc, Test twice daily prn, Disp: 100 each, Rfl: 3    Allergies:  Lamisil [Terbinafin*    SWELLING    Social Histor to person, place, and time. He appears well-developed and well-nourished. HENT:   Head: Normocephalic and atraumatic. Mouth/Throat: No oropharyngeal exudate. Eyes: EOM are normal. Pupils are equal, round, and reactive to light. No scleral icterus.

## 2019-01-25 ENCOUNTER — APPOINTMENT (OUTPATIENT)
Dept: GENERAL RADIOLOGY | Facility: HOSPITAL | Age: 64
End: 2019-01-25
Attending: NURSE PRACTITIONER
Payer: COMMERCIAL

## 2019-01-25 ENCOUNTER — HOSPITAL ENCOUNTER (EMERGENCY)
Facility: HOSPITAL | Age: 64
Discharge: HOME | End: 2019-01-25
Payer: COMMERCIAL

## 2019-01-25 VITALS
HEART RATE: 106 BPM | BODY MASS INDEX: 46.65 KG/M2 | TEMPERATURE: 98 F | WEIGHT: 315 LBS | RESPIRATION RATE: 20 BRPM | DIASTOLIC BLOOD PRESSURE: 85 MMHG | HEIGHT: 69 IN | OXYGEN SATURATION: 94 % | SYSTOLIC BLOOD PRESSURE: 159 MMHG

## 2019-01-25 DIAGNOSIS — S61.211A LACERATION OF LEFT INDEX FINGER WITHOUT FOREIGN BODY WITHOUT DAMAGE TO NAIL, INITIAL ENCOUNTER: Primary | ICD-10-CM

## 2019-01-25 PROCEDURE — 12001 RPR S/N/AX/GEN/TRNK 2.5CM/<: CPT

## 2019-01-25 PROCEDURE — 73140 X-RAY EXAM OF FINGER(S): CPT | Performed by: NURSE PRACTITIONER

## 2019-01-25 PROCEDURE — 99283 EMERGENCY DEPT VISIT LOW MDM: CPT

## 2019-01-25 NOTE — ED NOTES
Pt is alert and oriented x4 and very pleasant. States injured his left hand second digit this afternoon with a knife while prepping food. Wound without any bleeding at this time, pt states it just finally stopped bleeding. Denies any other injuries.

## 2019-01-25 NOTE — ED PROVIDER NOTES
Patient Seen in: [facility] Emergency Department    History   Patient presents with:  Laceration Abrasion (integumentary)    Stated Complaint: L 2nd Finger Lac    HPI    2nd digit laceration after cutting his finger with a knife after sharpening i Needle (BD PEN NEEDLE JESSEE U/F) 32G X 4 MM Does not apply Misc,  Use a new needle with each use   MetFORMIN HCl 500 MG Oral Tab,  TAKE 1 TABLET (500 MG TOTAL) BY MOUTH 2 (TWO) TIMES DAILY WITH MEALS.    Naproxen Sodium (ALEVE) 220 MG Oral Tab,  Take 1-2 tab lesions  NECK: supple, no meningeal signs  LUNGS: no resp distress, cta bilateral  CARDIO: RRR without murmur  GI: abdomen is soft and non tender, no masses, nl bowel sounds   EXTREMITIES: from, 5/5 strength in all 4 ext, no edema  NEURO: alert and oiented

## 2019-03-01 ENCOUNTER — OFFICE VISIT (OUTPATIENT)
Dept: SURGERY | Facility: CLINIC | Age: 64
End: 2019-03-01
Payer: MEDICARE

## 2019-03-01 VITALS
SYSTOLIC BLOOD PRESSURE: 133 MMHG | BODY MASS INDEX: 45.1 KG/M2 | DIASTOLIC BLOOD PRESSURE: 73 MMHG | OXYGEN SATURATION: 95 % | HEIGHT: 70 IN | HEART RATE: 84 BPM | RESPIRATION RATE: 16 BRPM | WEIGHT: 315 LBS

## 2019-03-01 DIAGNOSIS — E11.9 TYPE 2 DIABETES MELLITUS WITHOUT COMPLICATION, WITHOUT LONG-TERM CURRENT USE OF INSULIN (HCC): Primary | ICD-10-CM

## 2019-03-01 DIAGNOSIS — I10 ESSENTIAL HYPERTENSION: ICD-10-CM

## 2019-03-01 DIAGNOSIS — Z51.81 ENCOUNTER FOR THERAPEUTIC DRUG MONITORING: ICD-10-CM

## 2019-03-01 DIAGNOSIS — E66.01 OBESITY, CLASS III, BMI 40-49.9 (MORBID OBESITY) (HCC): ICD-10-CM

## 2019-03-01 DIAGNOSIS — R63.2 INCREASED APPETITE: ICD-10-CM

## 2019-03-01 PROCEDURE — 99214 OFFICE O/P EST MOD 30 MIN: CPT | Performed by: INTERNAL MEDICINE

## 2019-03-01 RX ORDER — ERGOCALCIFEROL 1.25 MG/1
CAPSULE ORAL
Refills: 1 | COMMUNITY
Start: 2019-02-08 | End: 2019-05-21

## 2019-03-01 NOTE — PROGRESS NOTES
[address] [place] [address], [address] [address] [address]  [address]  Dept: [phone]     Date:   18    Patient:  Jeet Heredia  :      1955  MRN:      [id number]    Chief Liraglutide -Weight Management (SAXENDA) 18 MG/3ML Subcutaneous Solution Pen-injector Inject into the skin.  Disp:  Rfl:    Insulin Pen Needle (BD PEN NEEDLE JESSEE U/F) 32G X 4 MM Does not apply Misc Use a new needle with each use Disp: 30 each Rfl: 3   Me Relationships      Social connections:        Talks on phone: Not on file        Gets together: Not on file        Attends Quaker service: Not on file        Active member of club or organization: Not on file        Attends meetings of clubs or [place] · Patient has a Food Journal?: no   · Patient is reading nutrition labels? yes  · Average Caloric Intake:     · Average CHO Intake: >100  · Is patient exercising? no  · Type of exercise?      Eating Habits  · Patient states the following:  · Eats 3 meal( His behavior is normal. Judgment and thought content normal.   Vitals reviewed.     ASSESSMENT     Encounter Diagnosis(ses):   Type 2 diabetes mellitus without complication, without long-term current use of insulin (hcc)  (primary encounter diagnosis)  Paola

## 2019-03-05 ENCOUNTER — OFFICE VISIT (OUTPATIENT)
Dept: PULMONOLOGY | Facility: CLINIC | Age: 64
End: 2019-03-05
Payer: MEDICARE

## 2019-03-05 VITALS
OXYGEN SATURATION: 95 % | HEIGHT: 68.4 IN | HEART RATE: 102 BPM | DIASTOLIC BLOOD PRESSURE: 80 MMHG | BODY MASS INDEX: 47.19 KG/M2 | SYSTOLIC BLOOD PRESSURE: 144 MMHG | WEIGHT: 315 LBS | RESPIRATION RATE: 18 BRPM

## 2019-03-05 DIAGNOSIS — G47.33 OSA (OBSTRUCTIVE SLEEP APNEA): Primary | ICD-10-CM

## 2019-03-05 DIAGNOSIS — Z87.891 PERSONAL HISTORY OF TOBACCO USE, PRESENTING HAZARDS TO HEALTH: ICD-10-CM

## 2019-03-05 PROCEDURE — 99203 OFFICE O/P NEW LOW 30 MIN: CPT | Performed by: INTERNAL MEDICINE

## 2019-03-05 PROCEDURE — G0463 HOSPITAL OUTPT CLINIC VISIT: HCPCS | Performed by: INTERNAL MEDICINE

## 2019-03-05 NOTE — PROGRESS NOTES
Dear  Kam Celestin :           As you know, Patricia Caldera is a 52-year-old male who I am now evaluating for obstructive sleep apnea.     HISTORY OF PRESENT ILLNESS: I last saw the patient 6 years ago as he presented with severe obstructive sleep apnea with an apnea plus No depression. No thyroid disease. No rash. Muscles and joints unremarkable. No weight loss no weight gain. PHYSICAL EXAMINATION: Vital signs normal. HEENT examination is unremarkable with pupils equal round and reactive to light and accommodation.  Neck [facility]

## 2019-03-07 ENCOUNTER — OFFICE VISIT (OUTPATIENT)
Dept: SLEEP CENTER | Age: 64
End: 2019-03-07
Attending: INTERNAL MEDICINE
Payer: MEDICARE

## 2019-03-07 DIAGNOSIS — Z76.89 SLEEP CONCERN: Primary | ICD-10-CM

## 2019-03-07 PROCEDURE — 95811 POLYSOM 6/>YRS CPAP 4/> PARM: CPT

## 2019-03-09 NOTE — PROCEDURES
[facility]  Accredited by the [employer] ([employer])    PATIENT'S NAME: Kimmie Ellington PHYSICIAN: Diane Camacho MD   REFERRING PHYSICIAN: Diane Camacho MD   PATIENT ACCOUNT #: [de-identified] LOCAT arousal index is 71.3 events per hour, the spontaneous arousal index is 7.7 events per hour, for a combined arousal index of 79.0 events per hour. There were no significant periodic limb movements. The lowest desaturation was to 78%.   The average heart r CWP and titrated upward to a final value of 13 CWP, during which the apnea plus hypopnea index fell to 2.8 events per hour and the lowest desaturation was to 91%.   Sleep architecture was mildly fragmented during the diagnostic analysis and then perfectly c

## 2019-03-11 ENCOUNTER — HOSPITAL ENCOUNTER (OUTPATIENT)
Dept: CT IMAGING | Facility: HOSPITAL | Age: 64
Discharge: HOME | End: 2019-03-11
Attending: INTERNAL MEDICINE
Payer: MEDICARE

## 2019-03-11 DIAGNOSIS — G47.33 OSA (OBSTRUCTIVE SLEEP APNEA): Primary | ICD-10-CM

## 2019-03-11 DIAGNOSIS — Z87.891 PERSONAL HISTORY OF TOBACCO USE, PRESENTING HAZARDS TO HEALTH: ICD-10-CM

## 2019-03-29 ENCOUNTER — NURSE TRIAGE (OUTPATIENT)
Dept: OTHER | Age: 64
End: 2019-03-29

## 2019-03-29 ENCOUNTER — HOSPITAL ENCOUNTER (OUTPATIENT)
Age: 64
Discharge: HOME | End: 2019-03-29
Attending: EMERGENCY MEDICINE
Payer: MEDICARE

## 2019-03-29 VITALS
DIASTOLIC BLOOD PRESSURE: 82 MMHG | RESPIRATION RATE: 16 BRPM | SYSTOLIC BLOOD PRESSURE: 150 MMHG | WEIGHT: 315 LBS | OXYGEN SATURATION: 98 % | TEMPERATURE: 99 F | BODY MASS INDEX: 46.65 KG/M2 | HEIGHT: 69 IN | HEART RATE: 82 BPM

## 2019-03-29 DIAGNOSIS — S61.012A THUMB LACERATION, LEFT, INITIAL ENCOUNTER: Primary | ICD-10-CM

## 2019-03-29 PROCEDURE — 12002 RPR S/N/AX/GEN/TRNK2.6-7.5CM: CPT

## 2019-03-29 PROCEDURE — 99213 OFFICE O/P EST LOW 20 MIN: CPT

## 2019-03-29 PROCEDURE — 99212 OFFICE O/P EST SF 10 MIN: CPT

## 2019-03-29 NOTE — ED INITIAL ASSESSMENT (HPI)
sustained 1 in lac to dorsal left thumb with a knief- lac well approx. No active bleeding last tetanus with in 5 years ago.

## 2019-03-29 NOTE — ED PROVIDER NOTES
Patient Seen in: [facility] [place]    History   Patient presents with:  Laceration Abrasion (integumentary)    Stated Complaint: sutures removal    HPI    Left thumb laceration with a kitchen knife. No numbness or weakness. Resp 16   Temp 98.5 °F (36.9 °C)   Temp src Oral   SpO2 98 %   O2 Device None (Room air)       Current:/82   Pulse 82   Temp 98.5 °F (36.9 °C) (Oral)   Resp 16   Ht 175.3 cm (5' 9\")   Wt (!) 149.7 kg   SpO2 98%   BMI 48.73 kg/m²         Physical E

## 2019-03-29 NOTE — TELEPHONE ENCOUNTER
Action Requested: Summary for Provider     []  Critical Lab, Recommendations Needed  [] Need Additional Advice  []   FYI    []   Need Orders  [] Need Medications Sent to Pharmacy  []  Other     SUMMARY: Pt stated that he just cut his thumb on his left hand

## 2019-04-30 ENCOUNTER — MED REC SCAN ONLY (OUTPATIENT)
Dept: INTERNAL MEDICINE CLINIC | Facility: CLINIC | Age: 64
End: 2019-04-30

## 2019-04-30 ENCOUNTER — OFFICE VISIT (OUTPATIENT)
Dept: INTERNAL MEDICINE CLINIC | Facility: CLINIC | Age: 64
End: 2019-04-30
Payer: MEDICARE

## 2019-04-30 VITALS
TEMPERATURE: 99 F | DIASTOLIC BLOOD PRESSURE: 80 MMHG | SYSTOLIC BLOOD PRESSURE: 137 MMHG | HEIGHT: 68.4 IN | HEART RATE: 74 BPM | WEIGHT: 315 LBS | BODY MASS INDEX: 47.19 KG/M2

## 2019-04-30 DIAGNOSIS — E11.9 TYPE 2 DIABETES MELLITUS WITHOUT COMPLICATION, WITHOUT LONG-TERM CURRENT USE OF INSULIN (HCC): ICD-10-CM

## 2019-04-30 DIAGNOSIS — E66.01 OBESITY, CLASS III, BMI 40-49.9 (MORBID OBESITY) (HCC): ICD-10-CM

## 2019-04-30 DIAGNOSIS — I10 ESSENTIAL HYPERTENSION: Primary | ICD-10-CM

## 2019-04-30 PROCEDURE — 99214 OFFICE O/P EST MOD 30 MIN: CPT | Performed by: INTERNAL MEDICINE

## 2019-04-30 PROCEDURE — G0463 HOSPITAL OUTPT CLINIC VISIT: HCPCS | Performed by: INTERNAL MEDICINE

## 2019-04-30 NOTE — PROGRESS NOTES
Patient ID: Jossy Obregon is a 62year old male. Patient presents with: Follow - Up       HISTORY OF PRESENT ILLNESS:   HPI  Patient presents for above. In follow-up of multiple issues. History of high blood pressure medications.   Blood pre ARTHROSCOPY Right 10/17/2016    Performed by Monique Porter MD at [facility] OR   • KNEE REPLACEMENT SURGERY     • KNEE TOTAL REPLACEMENT Right 6/12/2017    Performed by Monique Porter MD at [facility] OR   • TOTAL KNEE REPLACEMENT      R Total Knee ju file      Food insecurity:        Worry: Not on file        Inability: Not on file      Transportation needs:        Medical: Not on file        Non-medical: Not on file    Tobacco Use      Smoking status: Current Some Day Smoker        Packs/day: 1.50 Body mass index is 49.74 kg/m². Physical Exam   Constitutional: He appears well-developed and well-nourished. Cardiovascular: Normal rate, regular rhythm and normal heart sounds.    Pulmonary/Chest: Effort normal and breath sounds normal. No resp

## 2019-05-02 ENCOUNTER — APPOINTMENT (OUTPATIENT)
Dept: LAB | Age: 64
End: 2019-05-02
Attending: INTERNAL MEDICINE
Payer: MEDICARE

## 2019-05-02 DIAGNOSIS — E55.9 VITAMIN D DEFICIENCY: ICD-10-CM

## 2019-05-02 DIAGNOSIS — I10 ESSENTIAL HYPERTENSION: ICD-10-CM

## 2019-05-02 DIAGNOSIS — E11.9 TYPE 2 DIABETES MELLITUS WITHOUT COMPLICATION, WITHOUT LONG-TERM CURRENT USE OF INSULIN (HCC): ICD-10-CM

## 2019-05-02 DIAGNOSIS — E66.01 OBESITY, CLASS III, BMI 40-49.9 (MORBID OBESITY) (HCC): ICD-10-CM

## 2019-05-02 DIAGNOSIS — R73.09 ABNORMAL BLOOD SUGAR: ICD-10-CM

## 2019-05-02 DIAGNOSIS — E29.1 HYPOGONADISM IN MALE: ICD-10-CM

## 2019-05-02 DIAGNOSIS — Z00.00 ANNUAL PHYSICAL EXAM: ICD-10-CM

## 2019-05-02 DIAGNOSIS — Z13.9 SCREENING FOR CONDITION: ICD-10-CM

## 2019-05-02 DIAGNOSIS — M17.0 PRIMARY OSTEOARTHRITIS OF BOTH KNEES: ICD-10-CM

## 2019-05-02 PROCEDURE — 36415 COLL VENOUS BLD VENIPUNCTURE: CPT

## 2019-05-02 PROCEDURE — 83036 HEMOGLOBIN GLYCOSYLATED A1C: CPT

## 2019-05-02 PROCEDURE — 84403 ASSAY OF TOTAL TESTOSTERONE: CPT

## 2019-05-02 PROCEDURE — 93005 ELECTROCARDIOGRAM TRACING: CPT

## 2019-05-02 PROCEDURE — 83001 ASSAY OF GONADOTROPIN (FSH): CPT

## 2019-05-02 PROCEDURE — 83002 ASSAY OF GONADOTROPIN (LH): CPT

## 2019-05-02 PROCEDURE — 93010 ELECTROCARDIOGRAM REPORT: CPT | Performed by: INTERNAL MEDICINE

## 2019-05-03 ENCOUNTER — TELEPHONE (OUTPATIENT)
Dept: ENDOCRINOLOGY CLINIC | Facility: CLINIC | Age: 64
End: 2019-05-03

## 2019-05-03 NOTE — TELEPHONE ENCOUNTER
Spoke with Susanna Andres who verbalizes understanding of lab results. He has follow up w/ Dr Etta Laughlin in June and will plan to discuss clearance for T replacement at that time.

## 2019-05-03 NOTE — TELEPHONE ENCOUNTER
Reviewed labs  T remains low normal  He has BRYCE and  T replacement is not recommended in patients with BRYCE who are not stable on therapy.    I looked at his chart that he saw Dr. Deni Fam  He should let us know after he sees Dr. Deni Fam next and when he has naya

## 2019-05-06 ENCOUNTER — OFFICE VISIT (OUTPATIENT)
Dept: PAIN CLINIC | Facility: HOSPITAL | Age: 64
End: 2019-05-06
Attending: ANESTHESIOLOGY
Payer: MEDICARE

## 2019-05-06 VITALS
SYSTOLIC BLOOD PRESSURE: 139 MMHG | HEART RATE: 79 BPM | RESPIRATION RATE: 18 BRPM | BODY MASS INDEX: 47.74 KG/M2 | DIASTOLIC BLOOD PRESSURE: 82 MMHG | WEIGHT: 315 LBS | HEIGHT: 68 IN

## 2019-05-06 DIAGNOSIS — M51.36 DEGENERATIVE DISC DISEASE, LUMBAR: Primary | Chronic | ICD-10-CM

## 2019-05-06 DIAGNOSIS — M48.061 SPINAL STENOSIS OF LUMBAR REGION AT MULTIPLE LEVELS: Chronic | ICD-10-CM

## 2019-05-06 DIAGNOSIS — M17.0 PRIMARY OSTEOARTHRITIS OF BOTH KNEES: ICD-10-CM

## 2019-05-06 PROCEDURE — 99211 OFF/OP EST MAY X REQ PHY/QHP: CPT

## 2019-05-06 NOTE — CHRONIC PAIN
[facility]  Pain Clinic   New Consult       Patient name: Jeff Davis 62year old male  : 1955  MRN: [id number]  Referring MD: Georgi Serrato MD    COMPLAINT:  Referred to the pain clinic by Dr. Georgi Serrato MD for low back TOTAL REPLACEMENT Right 6/12/2017    Performed by De Rich MD at [facility] OR   • TOTAL KNEE REPLACEMENT      R Total Knee june 12, 2017     FAMILY HISTORY:  Family History   Problem Relation Age of Onset   • Heart Disease Father    • Cancer Mothwilliam ONETOUCH ULTRA BLUE In Vitro Strip, TEST TWICE DAILY AS NEEDED, Disp: 100 strip, Rfl: 1  •  ONETOUCH LANCETS Does not apply Misc, Test twice daily prn, Disp: 100 each, Rfl: 3  REVIEW OF SYSTEMS:   General: no weight change, change in appetite, thirst or fe spinal stenosis, or foraminal stenosis. L2-L3:   No significant disc/facet abnormality, spinal stenosis, or foraminal stenosis. L3-L4:   Moderate diffuse disc bulge with ligamentum flavum redundancy, mild facet arthropathy, and epidural lipomatosis. 11/12/2018    PT 12.4 09/30/2016       [place] PHYSICIAN MONITORING PROGRAM REVIEWED  No      ASSESSMENT/PLAN:   Patient is a(n) 62year old year old male with (M51.36) Degenerative disc disease, lumbar  (primary encounter diagnosis)    (M17.0) Primary os

## 2019-05-06 NOTE — PROGRESS NOTES
INITIAL CONSULT:  08/30/18  PRESENTS AMBULATORY USING HIS UP RIGHT WALKER;  ACCOMPANIED BY HIS WIFE;  NEW CONSULT C/O LBP;  PT REPORTS HE WAS A   [id number] AROUND IN THE CAB,  LIFTING HEAVY MACHINERY;  \"WEAR & TEAR ON MY BODY\";  PT IS AL

## 2019-05-13 ENCOUNTER — DOCUMENTATION ONLY (OUTPATIENT)
Dept: PAIN CLINIC | Facility: HOSPITAL | Age: 64
End: 2019-05-13

## 2019-05-13 NOTE — PROGRESS NOTES
Procedure code [id number]    Spoke with Mague Guerrero RN @ [facility] @  #[phone]    Per RN Case has been approved    auth # is [id number]    Order form sent to the surgery center, confirmation rcv'd.

## 2019-05-21 RX ORDER — ERGOCALCIFEROL 1.25 MG/1
CAPSULE ORAL
Qty: 12 CAPSULE | Refills: 1 | Status: SHIPPED | OUTPATIENT
Start: 2019-05-21 | End: 2019-11-15

## 2019-06-06 ENCOUNTER — OFFICE VISIT (OUTPATIENT)
Dept: PAIN CLINIC | Facility: HOSPITAL | Age: 64
End: 2019-06-06
Attending: NURSE PRACTITIONER
Payer: MEDICARE

## 2019-06-06 VITALS
WEIGHT: 315 LBS | BODY MASS INDEX: 47.74 KG/M2 | HEART RATE: 72 BPM | RESPIRATION RATE: 18 BRPM | HEIGHT: 68 IN | DIASTOLIC BLOOD PRESSURE: 88 MMHG | SYSTOLIC BLOOD PRESSURE: 131 MMHG

## 2019-06-06 DIAGNOSIS — M47.819 FACET ARTHROPATHY: ICD-10-CM

## 2019-06-06 DIAGNOSIS — M51.36 DEGENERATIVE DISC DISEASE, LUMBAR: Primary | Chronic | ICD-10-CM

## 2019-06-06 DIAGNOSIS — M48.061 SPINAL STENOSIS OF LUMBAR REGION AT MULTIPLE LEVELS: Chronic | ICD-10-CM

## 2019-06-06 PROCEDURE — 99211 OFF/OP EST MAY X REQ PHY/QHP: CPT

## 2019-06-06 NOTE — PROGRESS NOTES
INITIAL CONSULT:  08/30/18  PRESENTS AMBULATORY USING HIS UP RIGHT WALKER;  ACCOMPANIED BY HIS WIFE;  NEW CONSULT C/O LBP;  PT REPORTS HE WAS A   [id number] AROUND IN THE CAB,  LIFTING HEAVY MACHINERY;  \"WEAR & TEAR ON MY BODY\";  PT IS AL

## 2019-06-06 NOTE — CHRONIC PAIN
Follow-up Note  CC: LBP  HISTORY OF PRESENT ILLNESS:  Clement Arndt is a 62year old old male, originally referred to the pain clinic by Dr. Ghazal brar.  provider found, with history of Degenerative disc disease, lumbar  (primary encounter diagnosis) Rfl: 3   Naproxen Sodium (ALEVE) 220 MG Oral Tab Take 1-2 tablets by mouth 2 (two) times daily.  Disp:  Rfl:    Cyclobenzaprine HCl 10 MG Oral Tab TAKE 1 TABLET(S) BY MOUTH EVERY 8 HOURS AS NEEDED FOR MUSCLE SPASMS Disp:  Rfl: 3   Luliconazole (LUZU) 1 % Ex • Obesity, unspecified    • Osteoarthritis    • Pulmonary emphysema ([facility] [address] MILD   • Sleep apnea     does not tolerate machine       SURGICAL HISTORY:  Past Surgical History:   Procedure Laterality Date   • ARTHROSCOPY OF JOINT UNLISTED Left 2013   • ARTH Not on file      Stress: Not on file    Relationships      Social connections:        Talks on phone: Not on file        Gets together: Not on file        Attends Orthodox service: Not on file        Active member of club or organization: Not on file 5/5 5/5   Foot EHL 5/5 5/5   Gastrocnemius 5/5 5/5     Temperature:  normal to touch bilateral upper and lower extremities  Sensation (light touch/pinprick/temperature):   Right Lower Extremity:  Normal  Left Lower Extremity:  Normal  TPs: Absent: lumbo-sa greater than left neural foraminal stenosis. Mild   bilateral lateral recess stenosis. L5-S1:   Moderate to large diffuse disc bulge with superimposed left paracentral/subarticular zone disc protrusion/extrusion.  Ligamentum flavum redundancy with bilatera satisfaction during a comprehensive interactive discussion. All questions were answered during extended questions and answer session. Patient agreeable to discussion plan.  Greater than 50% of the time was spent with counseling (nature of discussion centere

## 2019-06-10 ENCOUNTER — OFFICE VISIT (OUTPATIENT)
Dept: SURGERY | Facility: CLINIC | Age: 64
End: 2019-06-10
Payer: MEDICARE

## 2019-06-10 VITALS
RESPIRATION RATE: 16 BRPM | OXYGEN SATURATION: 98 % | HEART RATE: 76 BPM | SYSTOLIC BLOOD PRESSURE: 135 MMHG | HEIGHT: 68 IN | WEIGHT: 315 LBS | DIASTOLIC BLOOD PRESSURE: 85 MMHG | BODY MASS INDEX: 47.74 KG/M2

## 2019-06-10 DIAGNOSIS — Z51.81 ENCOUNTER FOR THERAPEUTIC DRUG MONITORING: ICD-10-CM

## 2019-06-10 DIAGNOSIS — R63.2 INCREASED APPETITE: ICD-10-CM

## 2019-06-10 DIAGNOSIS — E66.01 OBESITY, CLASS III, BMI 40-49.9 (MORBID OBESITY) (HCC): ICD-10-CM

## 2019-06-10 DIAGNOSIS — E11.9 TYPE 2 DIABETES MELLITUS WITHOUT COMPLICATION, WITHOUT LONG-TERM CURRENT USE OF INSULIN (HCC): Primary | ICD-10-CM

## 2019-06-10 DIAGNOSIS — I10 ESSENTIAL HYPERTENSION: ICD-10-CM

## 2019-06-10 PROCEDURE — 99214 OFFICE O/P EST MOD 30 MIN: CPT | Performed by: INTERNAL MEDICINE

## 2019-06-10 NOTE — PROGRESS NOTES
[address] [place] [address] Brenda Carcamo, [address] [address] [address], [address]  [address]  Dept: [phone]     Date:   18    Patient:  Kareem Oates  :      1955  MRN:      [id number]    Chief MG Oral Tab TAKE 1 TABLET (500 MG TOTAL) BY MOUTH 2 (TWO) TIMES DAILY WITH MEALS.  Disp: 180 tablet Rfl: 1   Pitavastatin Calcium (LIVALO) 4 MG Oral Tab TAKE 1 TABLET BY MOUTH EVERY DAY IN THE EVENING Disp: 90 tablet Rfl: 0   Liraglutide -Weight Management file    Relationships      Social connections:        Talks on phone: Not on file        Gets together: Not on file        Attends Congregational service: Not on file        Active member of club or organization: Not on file        Attends meetings of clubs or · Patient has a Food Journal?: no   · Patient is reading nutrition labels? yes  · Average Caloric Intake:     · Average CHO Intake: >100  · Is patient exercising? no  · Type of exercise?      Eating Habits  · Patient states the following:  · Eats 3 me affect. His behavior is normal. Judgment and thought content normal.   Vitals reviewed.     ASSESSMENT     Encounter Diagnosis(ses):   Type 2 diabetes mellitus without complication, without long-term current use of insulin (hcc)  (primary encounter diagnosi

## 2019-06-11 ENCOUNTER — TELEPHONE (OUTPATIENT)
Dept: PAIN CLINIC | Facility: HOSPITAL | Age: 64
End: 2019-06-11

## 2019-06-11 NOTE — TELEPHONE ENCOUNTER
Procedure code 42184- pending insurance approval     Spoke with [place], information given, all clinical notes need to be faxed to [facility].  Clinical notes faxed to [facility]     Ref # [id number]    Once approval has been given write will reach out to the pt and schedule

## 2019-06-18 ENCOUNTER — OFFICE VISIT (OUTPATIENT)
Dept: PULMONOLOGY | Facility: CLINIC | Age: 64
End: 2019-06-18
Payer: MEDICARE

## 2019-06-18 VITALS
SYSTOLIC BLOOD PRESSURE: 125 MMHG | BODY MASS INDEX: 46.65 KG/M2 | OXYGEN SATURATION: 96 % | RESPIRATION RATE: 18 BRPM | HEART RATE: 88 BPM | HEIGHT: 69 IN | WEIGHT: 315 LBS | DIASTOLIC BLOOD PRESSURE: 77 MMHG

## 2019-06-18 DIAGNOSIS — G47.33 OSA (OBSTRUCTIVE SLEEP APNEA): Primary | ICD-10-CM

## 2019-06-18 PROCEDURE — G0463 HOSPITAL OUTPT CLINIC VISIT: HCPCS | Performed by: INTERNAL MEDICINE

## 2019-06-18 PROCEDURE — 99213 OFFICE O/P EST LOW 20 MIN: CPT | Performed by: INTERNAL MEDICINE

## 2019-06-18 NOTE — PROGRESS NOTES
The patient is a 20-year-old male who I know well from prior evaluation and comes in now for follow-up.   He notes he is doing pretty well with his CPAP device and his download is excellent with average daily usage 5 hours and 40 minutes with residual event

## 2019-06-19 NOTE — TELEPHONE ENCOUNTER
Current Outpatient Medications:   •  ONETOUCH ULTRA BLUE In Vitro Strip, TEST TWICE DAILY AS NEEDED, Disp: 100 strip, Rfl: 1

## 2019-06-20 NOTE — TELEPHONE ENCOUNTER
Refill passed per [facility] protocol.   Diabetes Medications  Protocol Criteria:  · Appointment scheduled in the past 6 months or the next 3 months  · A1C < 7.5 in the past 6 months  · Creatinine in the past 12 months  · Creatinine result < 1.5   Rece

## 2019-06-21 ENCOUNTER — TELEPHONE (OUTPATIENT)
Dept: PAIN CLINIC | Facility: HOSPITAL | Age: 64
End: 2019-06-21

## 2019-06-25 ENCOUNTER — TELEPHONE (OUTPATIENT)
Dept: PAIN CLINIC | Facility: HOSPITAL | Age: 64
End: 2019-06-25

## 2019-06-26 ENCOUNTER — HOSPITAL ENCOUNTER (OUTPATIENT)
Dept: GENERAL RADIOLOGY | Age: 64
Discharge: HOME | End: 2019-06-26
Attending: INTERNAL MEDICINE
Payer: MEDICARE

## 2019-06-26 ENCOUNTER — OFFICE VISIT (OUTPATIENT)
Dept: INTERNAL MEDICINE CLINIC | Facility: CLINIC | Age: 64
End: 2019-06-26
Payer: MEDICARE

## 2019-06-26 VITALS
WEIGHT: 315 LBS | DIASTOLIC BLOOD PRESSURE: 86 MMHG | HEART RATE: 101 BPM | HEIGHT: 69 IN | SYSTOLIC BLOOD PRESSURE: 130 MMHG | BODY MASS INDEX: 46.65 KG/M2 | TEMPERATURE: 99 F

## 2019-06-26 DIAGNOSIS — R10.13 EPIGASTRIC PAIN: ICD-10-CM

## 2019-06-26 DIAGNOSIS — R10.13 EPIGASTRIC PAIN: Primary | ICD-10-CM

## 2019-06-26 PROCEDURE — 74018 RADEX ABDOMEN 1 VIEW: CPT | Performed by: INTERNAL MEDICINE

## 2019-06-26 PROCEDURE — 99214 OFFICE O/P EST MOD 30 MIN: CPT | Performed by: INTERNAL MEDICINE

## 2019-06-26 PROCEDURE — G0463 HOSPITAL OUTPT CLINIC VISIT: HCPCS | Performed by: INTERNAL MEDICINE

## 2019-06-26 NOTE — PATIENT INSTRUCTIONS
Soft Diet  Your healthcare provider has prescribed a soft diet (also called gastrointestinal soft diet). This means eating foods that are soft, low in fiber, and easy to digest. This diet is for people with digestive problems.  A soft diet provides foods OK: Avocado, banana, baked peeled apple, applesauce, peeled ripe peaches or pears, canned fruit (apricots, cherries, peaches, pears), melons  Avoid: Raw apple, dried fruits, coconut, pineapple, grapes, fruit katherine, fruit snacks  Meat and fish  OK:  All f

## 2019-06-26 NOTE — PROGRESS NOTES
Patient ID: Misbah Matos is a 62year old male. Patient presents with:  Stomach Pain: Upset stomach Started yesterday afternoon, affecting sleep. HISTORY OF PRESENT ILLNESS:   HPI  Patient presents for above.   Here with a 24-hour history • KNEE TOTAL REPLACEMENT Right 6/12/2017    Performed by Callie Yarbrough MD at [facility]   • TOTAL KNEE REPLACEMENT      R Total Knee june 12, 2017         Current Outpatient Medications:   •  Glucose Blood (ONETOUCH ULTRA BLUE) In Vitro Strip, MYLENE Transportation needs:        Medical: Not on file        Non-medical: Not on file    Tobacco Use      Smoking status: Former Smoker        Packs/day: 1.50        Years: 30.00        Pack years: 39        Quit date: 2019        Years since quittin well-nourished. Eyes: No scleral icterus. Cardiovascular: Normal rate, regular rhythm and normal heart sounds. Pulmonary/Chest: Effort normal and breath sounds normal. No respiratory distress. Abdominal: Normal appearance. He exhibits distension.  B

## 2019-08-07 ENCOUNTER — OFFICE VISIT (OUTPATIENT)
Dept: INTERNAL MEDICINE CLINIC | Facility: CLINIC | Age: 64
End: 2019-08-07
Payer: MEDICARE

## 2019-08-07 VITALS
SYSTOLIC BLOOD PRESSURE: 135 MMHG | WEIGHT: 315 LBS | HEIGHT: 69 IN | BODY MASS INDEX: 46.65 KG/M2 | TEMPERATURE: 99 F | DIASTOLIC BLOOD PRESSURE: 79 MMHG | HEART RATE: 82 BPM

## 2019-08-07 DIAGNOSIS — E66.01 OBESITY, CLASS III, BMI 40-49.9 (MORBID OBESITY) (HCC): ICD-10-CM

## 2019-08-07 DIAGNOSIS — I10 ESSENTIAL HYPERTENSION: ICD-10-CM

## 2019-08-07 DIAGNOSIS — E11.9 TYPE 2 DIABETES MELLITUS WITHOUT COMPLICATION, WITHOUT LONG-TERM CURRENT USE OF INSULIN (HCC): Primary | ICD-10-CM

## 2019-08-07 PROCEDURE — G0463 HOSPITAL OUTPT CLINIC VISIT: HCPCS | Performed by: INTERNAL MEDICINE

## 2019-08-07 PROCEDURE — 99214 OFFICE O/P EST MOD 30 MIN: CPT | Performed by: INTERNAL MEDICINE

## 2019-08-07 NOTE — PATIENT INSTRUCTIONS
Diet: Diabetes  Food is an important tool that you can use to control diabetes and stay healthy. Eating well-balanced meals in the correct amounts will help you control your blood glucose levels and prevent low blood sugar reactions.  It will also help you · Avoid added salt. It can contribute to high blood pressure, which can cause heart disease. People with diabetes already have a risk of high blood pressure and heart disease. · Stay at a healthy weight.  If you need to lose weight, cut down on your portio When shopping, read food labels. Salt is often called sodium on the label. Choose foods that are salt-free, low salt, or very low salt. Note that foods with reduced salt may not lower your salt intake enough.     Beans, potatoes, and pasta  Ok: Dry beans, s Avoid: Regular ketchup, relishes, pickles, soy sauce, teriyaki sauce, Worcestershire sauce, BBQ sauce, tartar sauce, meat tenderizer, chili sauce, regular gravy, regular salad dressing, salted butter  Soups  Ok: Low-salt soups and broths made with allowed

## 2019-08-07 NOTE — PROGRESS NOTES
Patient ID: Lakesha Patel is a 62year old male. Patient presents with: Follow - Up: 3 month        HISTORY OF PRESENT ILLNESS:   HPI  Patient presents for above. Next and here for follow-up of multiple issues. History of type 2 diabetes. 10 yrs ago   • COLONOSCOPY N/A 11/29/2017    Performed by George Garza MD at [facility] ENDOSCOPY   • KNEE ARTHROSCOPY Right 10/17/2016    Performed by Carleen Myers MD at [address] [place] [address]     • KNEE TOTAL REPLACEMENT Right 6 Highest education level: Not on file    Occupational History      Not on file    Social Needs      Financial resource strain: Not on file      Food insecurity:        Worry: Not on file        Inability: Not on file      Transportation needs:        Medic BP: 135/79   Pulse: 82   Temp: 98.8 °F (37.1 °C)   TempSrc: Oral   Weight: (!) 325 lb (147.4 kg)   Height: 5' 9\" (1.753 m)       Body mass index is 47.99 kg/m². Physical Exam   Constitutional: He appears well-developed and well-nourished.    Eyes: No

## 2019-08-13 ENCOUNTER — OFFICE VISIT (OUTPATIENT)
Dept: OPTOMETRY | Facility: CLINIC | Age: 64
End: 2019-08-13
Payer: MEDICARE

## 2019-08-13 DIAGNOSIS — H25.13 AGE-RELATED NUCLEAR CATARACT OF BOTH EYES: ICD-10-CM

## 2019-08-13 DIAGNOSIS — E11.9 TYPE 2 DIABETES MELLITUS WITHOUT COMPLICATION, WITHOUT LONG-TERM CURRENT USE OF INSULIN (HCC): Primary | ICD-10-CM

## 2019-08-13 PROCEDURE — 92014 COMPRE OPH EXAM EST PT 1/>: CPT | Performed by: OPTOMETRIST

## 2019-08-13 NOTE — PATIENT INSTRUCTIONS
Age-related nuclear cataract of both eyes  No treatment is required. Will continue to observe.     Type 2 diabetes mellitus without complication, without long-term current use of insulin ([address]  I advised patient that there is no background diabetic retinopa

## 2019-08-13 NOTE — PROGRESS NOTES
Clement Arndt is a 62year old male. HPI:     HPI     Diabetic Eye Exam     Diabetes characteristics include controlled with diet and taking oral medications. Duration of 9 years. Number of years diabetic 9. Number of years on pills 9.   Numb drinks      Comment: Weekly, 4 Beers;     Drug use: No      Medications:    Current Outpatient Medications:  Glucose Blood (ONETOUCH ULTRA BLUE) In Vitro Strip TEST TWICE DAILY AS NEEDED Disp: 200 strip Rfl: 3   ERGOCALCIFEROL 42911 units Oral Cap TAKE 1 C Pupils       Pupils    Right PERRL    Left PERRL          Visual Fields       Left Right     Full Full          Extraocular Movement       Right Left     Full, Ortho Full, Ortho          Neuro/Psych     Oriented x3:  Yes    Mood/Affect:  Normal placed in this encounter.       Meds This Visit:  Requested Prescriptions      No prescriptions requested or ordered in this encounter        Follow up instructions:  Return in about 1 year (around 8/13/2020) for Diabetic Eye exam.    8/13/2019  Scribed by:

## 2019-09-05 ENCOUNTER — OFFICE VISIT (OUTPATIENT)
Dept: SURGERY | Facility: CLINIC | Age: 64
End: 2019-09-05
Payer: MEDICARE

## 2019-09-05 VITALS
DIASTOLIC BLOOD PRESSURE: 90 MMHG | WEIGHT: 315 LBS | HEART RATE: 81 BPM | SYSTOLIC BLOOD PRESSURE: 146 MMHG | BODY MASS INDEX: 47.74 KG/M2 | HEIGHT: 68 IN | OXYGEN SATURATION: 98 %

## 2019-09-05 DIAGNOSIS — E11.9 TYPE 2 DIABETES MELLITUS WITHOUT COMPLICATION, WITHOUT LONG-TERM CURRENT USE OF INSULIN (HCC): ICD-10-CM

## 2019-09-05 DIAGNOSIS — I10 ESSENTIAL HYPERTENSION: ICD-10-CM

## 2019-09-05 DIAGNOSIS — Z51.81 ENCOUNTER FOR THERAPEUTIC DRUG MONITORING: ICD-10-CM

## 2019-09-05 DIAGNOSIS — G47.33 OSA (OBSTRUCTIVE SLEEP APNEA): Primary | ICD-10-CM

## 2019-09-05 DIAGNOSIS — R63.2 INCREASED APPETITE: ICD-10-CM

## 2019-09-05 DIAGNOSIS — E66.01 MORBID OBESITY WITH BMI OF 50.0-59.9, ADULT (HCC): ICD-10-CM

## 2019-09-05 PROCEDURE — 99214 OFFICE O/P EST MOD 30 MIN: CPT | Performed by: INTERNAL MEDICINE

## 2019-09-05 NOTE — PROGRESS NOTES
[address] [place] [address] Ronald Martin, [address] [address] [address], [address]  [address]  Dept: [phone]     Date:   18    Patient:  Marcela Cole  :      1955  MRN:      [id number]    Chief SUCCINATE ER 25 MG Oral Tablet 24 Hr TAKE 1 TABLET (25 MG TOTAL) BY MOUTH DAILY. Disp: 90 tablet Rfl: 1   metFORMIN HCl 500 MG Oral Tab TAKE 1 TABLET (500 MG TOTAL) BY MOUTH 2 (TWO) TIMES DAILY WITH MEALS.  Disp: 180 tablet Rfl: 1   Pitavastatin Calcium (LI file        Attends meetings of clubs or organizations: Not on file        Relationship status: Not on file      Intimate partner violence:        Fear of current or ex partner: Not on file        Emotionally abused: Not on file        Physically abused: N Habits  · Patient states the following:  · Eats 3 meal(s) per day  · Length of time it takes to consume a meal:  15 min  · # of snacks per day: 1-2 Type of snacks:  nuts  · Amount of soda consumption per day:  regular  · Amount of water (in ounces) per day hypertension  Type 2 diabetes mellitus without complication, without long-term current use of insulin (hcc)  Increased appetite  Encounter for therapeutic drug monitoring  Morbid obesity with bmi of 50.0-59.9, adult (hcc)    PLAN   Patient is not intereste

## 2019-09-06 NOTE — ADDENDUM NOTE
Addended by: Josh Mackey on: 11/27/2017 08:38 AM     Modules accepted: Zain LSW spoke with pt and she is currently living with her mother. Pt has an active CS case but she will be taking baby home with her to her mother's house tomorrow with the permission of her  (Milagros Bernardo). Pt has most things needed for baby and resources were reviewed for help getting the rest of what she needs. We discussed the [facility] and pt was positive in December but has not used since. [facility] is aware and frequently drug test her and she has been clean throughout the pregnancy. FOB is involved but not reliable. No other problems or concerns at this time.

## 2019-11-15 RX ORDER — ERGOCALCIFEROL 1.25 MG/1
CAPSULE ORAL
Qty: 12 CAPSULE | Refills: 1 | Status: SHIPPED | OUTPATIENT
Start: 2019-11-15 | End: 2020-04-27

## 2019-11-18 ENCOUNTER — TELEPHONE (OUTPATIENT)
Dept: INTERNAL MEDICINE CLINIC | Facility: CLINIC | Age: 64
End: 2019-11-18

## 2019-11-18 NOTE — TELEPHONE ENCOUNTER
Patient needs a renewal of parking placard, patient doesn't not have form please call patient once done

## 2019-11-19 ENCOUNTER — MED REC SCAN ONLY (OUTPATIENT)
Dept: INTERNAL MEDICINE CLINIC | Facility: CLINIC | Age: 64
End: 2019-11-19

## 2019-11-20 ENCOUNTER — OFFICE VISIT (OUTPATIENT)
Dept: SURGERY | Facility: CLINIC | Age: 64
End: 2019-11-20
Payer: MEDICARE

## 2019-11-20 VITALS
BODY MASS INDEX: 47.74 KG/M2 | DIASTOLIC BLOOD PRESSURE: 80 MMHG | HEIGHT: 68 IN | SYSTOLIC BLOOD PRESSURE: 130 MMHG | WEIGHT: 315 LBS

## 2019-11-20 DIAGNOSIS — G47.33 OSA (OBSTRUCTIVE SLEEP APNEA): Primary | ICD-10-CM

## 2019-11-20 DIAGNOSIS — R63.2 INCREASED APPETITE: ICD-10-CM

## 2019-11-20 DIAGNOSIS — E11.9 TYPE 2 DIABETES MELLITUS WITHOUT COMPLICATION, WITHOUT LONG-TERM CURRENT USE OF INSULIN (HCC): ICD-10-CM

## 2019-11-20 DIAGNOSIS — Z51.81 ENCOUNTER FOR THERAPEUTIC DRUG MONITORING: ICD-10-CM

## 2019-11-20 DIAGNOSIS — I10 ESSENTIAL HYPERTENSION: ICD-10-CM

## 2019-11-20 DIAGNOSIS — E66.01 MORBID OBESITY WITH BMI OF 45.0-49.9, ADULT (HCC): ICD-10-CM

## 2019-11-20 PROCEDURE — 99214 OFFICE O/P EST MOD 30 MIN: CPT | Performed by: INTERNAL MEDICINE

## 2019-11-20 NOTE — PROGRESS NOTES
[address] [place] [address] Gerber August, [address] [address] [place], [address]  [address]  Dept: [phone]     Date:   18    Patient:  Tony Vincent  :      1955  MRN:      [id number]    Chief ON 1/21/2020] Lisdexamfetamine Dimesylate (VYVANSE) 40 MG Oral Cap Take 1 capsule (40 mg total) by mouth daily. 30 capsule 0   • ERGOCALCIFEROL 1.25 MG (35243 UT) Oral Cap TAKE 1 CAPSULE (50,000 UNITS TOTAL) BY MOUTH ONCE A WEEK FOR 12 DOSES.  12 capsule 1 No      Sexual activity: Not on file    Lifestyle      Physical activity:        Days per week: Not on file        Minutes per session: Not on file      Stress: Not on file    Relationships      Social connections:        Talks on phone: Not on file cancer   • Hypertension Mother    • Diabetes Brother    • Lipids Other         Family H/o: Hyperlipidemia   • Glaucoma Neg        Food Journal  · Reviewed and Discussed:       · Patient has a Food Journal?: no   · Patient is reading nutrition labels?   yes motion. General: No edema. Neurological: He is alert and oriented to person, place, and time. Skin: Skin is warm and dry. Psychiatric: He has a normal mood and affect.  His behavior is normal. Judgment and thought content normal.   Vitals re Not tolerating saxenda (gave him diarrhea) discontinue     Tolerating vyvanse  Increase dose to 40mg  ekg done    Met with Dr Bear Meyers MD

## 2019-11-21 RX ORDER — METOPROLOL SUCCINATE 25 MG/1
25 TABLET, EXTENDED RELEASE ORAL DAILY
Qty: 90 TABLET | Refills: 1 | Status: SHIPPED | OUTPATIENT
Start: 2019-11-21 | End: 2020-05-13

## 2020-01-20 ENCOUNTER — TELEPHONE (OUTPATIENT)
Dept: SURGERY | Facility: CLINIC | Age: 65
End: 2020-01-20

## 2020-02-13 ENCOUNTER — TELEPHONE (OUTPATIENT)
Dept: PULMONOLOGY | Facility: CLINIC | Age: 65
End: 2020-02-13

## 2020-02-26 ENCOUNTER — OFFICE VISIT (OUTPATIENT)
Dept: INTERNAL MEDICINE CLINIC | Facility: CLINIC | Age: 65
End: 2020-02-26
Payer: MEDICARE

## 2020-02-26 VITALS
HEIGHT: 68 IN | TEMPERATURE: 99 F | WEIGHT: 315 LBS | DIASTOLIC BLOOD PRESSURE: 83 MMHG | SYSTOLIC BLOOD PRESSURE: 145 MMHG | BODY MASS INDEX: 47.74 KG/M2 | HEART RATE: 87 BPM

## 2020-02-26 DIAGNOSIS — I10 ESSENTIAL HYPERTENSION: ICD-10-CM

## 2020-02-26 DIAGNOSIS — M48.061 SPINAL STENOSIS OF LUMBAR REGION AT MULTIPLE LEVELS: Chronic | ICD-10-CM

## 2020-02-26 DIAGNOSIS — Z12.11 COLON CANCER SCREENING: ICD-10-CM

## 2020-02-26 DIAGNOSIS — Z13.6 SCREENING FOR CARDIOVASCULAR CONDITION: ICD-10-CM

## 2020-02-26 DIAGNOSIS — E11.9 TYPE 2 DIABETES MELLITUS WITHOUT COMPLICATION, WITHOUT LONG-TERM CURRENT USE OF INSULIN (HCC): ICD-10-CM

## 2020-02-26 DIAGNOSIS — E66.01 MORBID OBESITY WITH BMI OF 45.0-49.9, ADULT (HCC): ICD-10-CM

## 2020-02-26 DIAGNOSIS — M17.0 PRIMARY OSTEOARTHRITIS OF BOTH KNEES: ICD-10-CM

## 2020-02-26 DIAGNOSIS — G47.33 OSA (OBSTRUCTIVE SLEEP APNEA): ICD-10-CM

## 2020-02-26 DIAGNOSIS — Z00.00 ENCOUNTER FOR ANNUAL HEALTH EXAMINATION: Primary | ICD-10-CM

## 2020-02-26 DIAGNOSIS — Z13.1 SCREENING FOR DIABETES MELLITUS (DM): ICD-10-CM

## 2020-02-26 PROCEDURE — G0402 INITIAL PREVENTIVE EXAM: HCPCS | Performed by: INTERNAL MEDICINE

## 2020-02-26 PROCEDURE — G0463 HOSPITAL OUTPT CLINIC VISIT: HCPCS | Performed by: INTERNAL MEDICINE

## 2020-02-26 PROCEDURE — 99213 OFFICE O/P EST LOW 20 MIN: CPT | Performed by: INTERNAL MEDICINE

## 2020-02-26 NOTE — PATIENT INSTRUCTIONS
Yeni Lennon's SCREENING SCHEDULE   Tests on this list are recommended by your physician but may not be covered, or covered at this frequency, by your insurer. Please check with your insurance carrier before scheduling to verify coverage.     [place] Anyone with a family history    Colorectal Cancer Screening Covered up to Age 76     Colonoscopy Screen   Covered every 10 years- more often if abnormal Colonoscopy due on 11/29/2022 Update [facility] if applicable    Flex Sigmoidoscopy Screen  Cov with a cut with metal- TD and TDaP Not covered by Medicare Part B) No orders found for this or any previous visit.  This may be covered with your prescription benefits, but Medicare does not cover unless Medically needed    Zoster (Not covered by Medicare P routine exams   Blood pressure All men in this age group Yearly checkup if your blood pressure is normal  Normal blood pressure is less than 120/80 mm Hg  If your blood pressure reading is higher than normal, follow the advice of your healthcare provider men in this age group Every 1 to 2 years; if you have a chronic health condition, ask your healthcare provider if you needs exams more often   Vaccine Who needs it How often   Chickenpox (varicella) All men in this age group who have no record of this infe [facility]   Date Last Reviewed: 2/1/2017  © 6662-6519 [employer] [address] [facility], [facility] [place]. All rights reserved. This information is not intended as a substitute for professional medical care.  Always follo buttermilk, instant breakfast drink  Desserts  Ok: Ice cream, frozen yogurt, juice bars, gelatin, cookies and pies, sugar, honey, jelly, hard candy  Avoid: Most pies, cakes and cookies prepared or processed with salt; instant pudding  Drinks  Ok: Tea, coff well-balanced meals in the correct amounts will help you control your blood glucose levels and prevent low blood sugar reactions. It will also help you reduce the health risks of diabetes.  There is no one specific diet that is right for everyone with diabe already have a risk of high blood pressure and heart disease. · Stay at a healthy weight. If you need to lose weight, cut down on your portion sizes. But don’t skip meals.  Exercise is an important part of any weight management program. Talk with your prov

## 2020-02-26 NOTE — PROGRESS NOTES
HPI:   Miller Garcia is a 60year old male who presents for a Medicare Initial Preventative Physical Exam (Welcome to Medicare- < 12 months on Medicare). Patient presents for above.   Here for his welcome to Medicare annual wellness exam.    H on multiple medications?: 1-Yes  Does pain affect your day to day activities?: 1-Yes  Have you had any memory issues?: 0-No  Fall/Risk Scorin  Scoring Interpretation: 4+ At Risk      Cognitive Assessment   He had a completely normal cognitive assessmen ORTHOPEDIC)  Latanya Morejon OD as Consulting Physician (OPTOMETRY)  Anisha Patel MD as Consulting Physician ([facility]75)  Yulisa Oro MD (Anesthesiology)  GAMA Mccullough (Nurse Practitioner)  Nathaniel Burns MD as Consulting P BY MOUTH DAILY. ERGOCALCIFEROL 1.25 MG (07827 UT) Oral Cap, TAKE 1 CAPSULE (50,000 UNITS TOTAL) BY MOUTH ONCE A WEEK FOR 12 DOSES.   Glucose Blood (ONETOUCH ULTRA BLUE) In Vitro Strip, TEST TWICE DAILY AS NEEDED  ONETOUCH LANCETS Does not apply Misc, Test Genitourinary: Negative. Musculoskeletal: Positive for myalgias, back pain and joint pain. Skin: Negative. Allergic/Immunologic: Negative. Neurological: Negative. Hematological: Negative. Psychiatric/Behavioral: Negative.       EXAM:   BP Yes                Visual Acuity  Right Eye Visual Acuity: Uncorrected Right Eye Chart Acuity: 20/40   Left Eye Visual Acuity: Uncorrected Left Eye Chart Acuity: 20/50   Both Eyes Visual Acuity: Uncorrected Both Eyes Chart Acuity: 20/30   Able To Tolerate annual health examination  -     LIPID PANEL; Future  -     COMP METABOLIC PANEL (14); Future  -     TSH W REFLEX TO FREE T4; Future  -     CBC WITH DIFFERENTIAL WITH PLATELET;  Future  -     PSA, DIAGNOSTIC; Future    Essential hypertension  -     OFFICE/O patient  PREVENTATIVE SERVICES  INDICATIONS AND SCHEDULE Internal Lab or Procedure External Lab or Procedure   Diabetes Screening      HbgA1C   Annually HgbA1C (%)   Date Value   05/02/2019 7.2 (H)       No flowsheet data found.     Fasting Blood Sugar (FSB Toxoid  Only covered with a cut with metal- TD and TDaP Not covered by Medicare Part B) No vaccine history found This may be covered with your prescription benefits, but Medicare does not cover unless Medically needed    Zoster   Not covered by Medicare [place]

## 2020-02-27 ENCOUNTER — OFFICE VISIT (OUTPATIENT)
Dept: SURGERY | Facility: CLINIC | Age: 65
End: 2020-02-27
Payer: MEDICARE

## 2020-02-27 VITALS
HEART RATE: 88 BPM | SYSTOLIC BLOOD PRESSURE: 147 MMHG | BODY MASS INDEX: 47.74 KG/M2 | WEIGHT: 315 LBS | HEIGHT: 68 IN | OXYGEN SATURATION: 96 % | DIASTOLIC BLOOD PRESSURE: 86 MMHG

## 2020-02-27 DIAGNOSIS — E66.01 MORBID OBESITY WITH BMI OF 45.0-49.9, ADULT (HCC): ICD-10-CM

## 2020-02-27 DIAGNOSIS — G47.33 OSA (OBSTRUCTIVE SLEEP APNEA): ICD-10-CM

## 2020-02-27 DIAGNOSIS — M17.0 PRIMARY OSTEOARTHRITIS OF BOTH KNEES: Primary | ICD-10-CM

## 2020-02-27 DIAGNOSIS — Z51.81 ENCOUNTER FOR THERAPEUTIC DRUG MONITORING: ICD-10-CM

## 2020-02-27 DIAGNOSIS — R63.2 INCREASED APPETITE: ICD-10-CM

## 2020-02-27 DIAGNOSIS — E11.9 TYPE 2 DIABETES MELLITUS WITHOUT COMPLICATION, WITHOUT LONG-TERM CURRENT USE OF INSULIN (HCC): ICD-10-CM

## 2020-02-27 DIAGNOSIS — I10 ESSENTIAL HYPERTENSION: ICD-10-CM

## 2020-02-27 DIAGNOSIS — R63.2 BINGE EATING: ICD-10-CM

## 2020-02-27 PROCEDURE — 99214 OFFICE O/P EST MOD 30 MIN: CPT | Performed by: INTERNAL MEDICINE

## 2020-02-27 NOTE — PROGRESS NOTES
[address] [place] [address] Lauretta Bumpers, [address], [address]  Dept: [phone]         Patient:  Juarez Acosta  :      1955  MRN:      [id number]    Chief Complaint:  Pa Strip TEST TWICE DAILY AS NEEDED 200 strip 3   • ONETOUCH LANCETS Does not apply Misc Test twice daily prn 100 each 3   • metFORMIN HCl 500 MG Oral Tab TAKE 1 TABLET (500 MG TOTAL) BY MOUTH 2 (TWO) TIMES DAILY WITH MEALS. 180 tablet 1   • Pitavastatin Calc clubs or organizations: Not on file        Relationship status: Not on file      Intimate partner violence:        Fear of current or ex partner: Not on file        Emotionally abused: Not on file        Physically abused: Not on file        Forced sexual following:  · Eats 3 meal(s) per day  · Length of time it takes to consume a meal:  15 min  · # of snacks per day: 1-2 Type of snacks:  nuts  · Amount of soda consumption per day:  regular  · Amount of water (in ounces) per day:  <60 oz  · Drinking between diabetes mellitus without complication, without long-term current use of insulin ([facility])  Maurisio (obstructive sleep apnea)  Essential hypertension  Morbid obesity with bmi of 45.0-49.9, adult ([facility])  Encounter for therapeutic drug monitoring    PLAN   Patient is

## 2020-02-29 ENCOUNTER — HOSPITAL ENCOUNTER (OUTPATIENT)
Dept: CT IMAGING | Facility: HOSPITAL | Age: 65
Discharge: HOME | End: 2020-02-29
Attending: INTERNAL MEDICINE
Payer: MEDICARE

## 2020-02-29 DIAGNOSIS — R91.1 PULMONARY NODULE: ICD-10-CM

## 2020-02-29 PROCEDURE — 71250 CT THORAX DX C-: CPT | Performed by: INTERNAL MEDICINE

## 2020-03-02 ENCOUNTER — LAB ENCOUNTER (OUTPATIENT)
Dept: LAB | Age: 65
End: 2020-03-02
Attending: INTERNAL MEDICINE
Payer: MEDICARE

## 2020-03-02 DIAGNOSIS — Z00.00 ENCOUNTER FOR ANNUAL HEALTH EXAMINATION: ICD-10-CM

## 2020-03-02 DIAGNOSIS — Z13.1 SCREENING FOR DIABETES MELLITUS (DM): ICD-10-CM

## 2020-03-02 DIAGNOSIS — E11.9 TYPE 2 DIABETES MELLITUS WITHOUT COMPLICATION, WITHOUT LONG-TERM CURRENT USE OF INSULIN (HCC): ICD-10-CM

## 2020-03-02 DIAGNOSIS — Z13.6 SCREENING FOR CARDIOVASCULAR CONDITION: ICD-10-CM

## 2020-03-02 LAB
ALBUMIN SERPL-MCNC: 3.7 G/DL (ref 3.4–5)
ALBUMIN/GLOB SERPL: 0.8 {RATIO} (ref 1–2)
ALP LIVER SERPL-CCNC: 88 U/L (ref 45–117)
ALT SERPL-CCNC: 42 U/L (ref 16–61)
ANION GAP SERPL CALC-SCNC: 6 MMOL/L (ref 0–18)
AST SERPL-CCNC: 28 U/L (ref 15–37)
BASOPHILS # BLD AUTO: 0.06 X10(3) UL (ref 0–0.2)
BASOPHILS NFR BLD AUTO: 0.7 %
BILIRUB SERPL-MCNC: 1 MG/DL (ref 0.1–2)
BUN BLD-MCNC: 18 MG/DL (ref 7–18)
BUN/CREAT SERPL: 14.6 (ref 10–20)
CALCIUM BLD-MCNC: 9.1 MG/DL (ref 8.5–10.1)
CHLORIDE SERPL-SCNC: 105 MMOL/L (ref 98–112)
CHOLEST SMN-MCNC: 155 MG/DL (ref ?–200)
CO2 SERPL-SCNC: 26 MMOL/L (ref 21–32)
CREAT BLD-MCNC: 1.23 MG/DL (ref 0.7–1.3)
DEPRECATED RDW RBC AUTO: 42.2 FL (ref 35.1–46.3)
EOSINOPHIL # BLD AUTO: 0.19 X10(3) UL (ref 0–0.7)
EOSINOPHIL NFR BLD AUTO: 2.1 %
ERYTHROCYTE [DISTWIDTH] IN BLOOD BY AUTOMATED COUNT: 12.8 % (ref 11–15)
EST. AVERAGE GLUCOSE BLD GHB EST-MCNC: 163 MG/DL (ref 68–126)
GLOBULIN PLAS-MCNC: 4.7 G/DL (ref 2.8–4.4)
GLUCOSE BLD-MCNC: 140 MG/DL (ref 70–99)
HBA1C MFR BLD HPLC: 7.3 % (ref ?–5.7)
HCT VFR BLD AUTO: 44.2 % (ref 39–53)
HDLC SERPL-MCNC: 40 MG/DL (ref 40–59)
HGB BLD-MCNC: 15.2 G/DL (ref 13–17.5)
IMM GRANULOCYTES # BLD AUTO: 0.02 X10(3) UL (ref 0–1)
IMM GRANULOCYTES NFR BLD: 0.2 %
LDLC SERPL CALC-MCNC: 83 MG/DL (ref ?–100)
LYMPHOCYTES # BLD AUTO: 2.58 X10(3) UL (ref 1–4)
LYMPHOCYTES NFR BLD AUTO: 29.1 %
M PROTEIN MFR SERPL ELPH: 8.4 G/DL (ref 6.4–8.2)
MCH RBC QN AUTO: 31.1 PG (ref 26–34)
MCHC RBC AUTO-ENTMCNC: 34.4 G/DL (ref 31–37)
MCV RBC AUTO: 90.6 FL (ref 80–100)
MONOCYTES # BLD AUTO: 0.7 X10(3) UL (ref 0.1–1)
MONOCYTES NFR BLD AUTO: 7.9 %
NEUTROPHILS # BLD AUTO: 5.33 X10 (3) UL (ref 1.5–7.7)
NEUTROPHILS # BLD AUTO: 5.33 X10(3) UL (ref 1.5–7.7)
NEUTROPHILS NFR BLD AUTO: 60 %
NONHDLC SERPL-MCNC: 115 MG/DL (ref ?–130)
OSMOLALITY SERPL CALC.SUM OF ELEC: 288 MOSM/KG (ref 275–295)
PATIENT FASTING Y/N/NP: YES
PATIENT FASTING Y/N/NP: YES
PLATELET # BLD AUTO: 311 10(3)UL (ref 150–450)
POTASSIUM SERPL-SCNC: 4.3 MMOL/L (ref 3.5–5.1)
PSA SERPL-MCNC: 1.09 NG/ML (ref ?–4)
RBC # BLD AUTO: 4.88 X10(6)UL (ref 4.3–5.7)
SODIUM SERPL-SCNC: 137 MMOL/L (ref 136–145)
TRIGL SERPL-MCNC: 158 MG/DL (ref 30–149)
TSI SER-ACNC: 2.72 MIU/ML (ref 0.36–3.74)
VLDLC SERPL CALC-MCNC: 32 MG/DL (ref 0–30)
WBC # BLD AUTO: 8.9 X10(3) UL (ref 4–11)

## 2020-03-02 PROCEDURE — 84443 ASSAY THYROID STIM HORMONE: CPT

## 2020-03-02 PROCEDURE — 83036 HEMOGLOBIN GLYCOSYLATED A1C: CPT

## 2020-03-02 PROCEDURE — 80061 LIPID PANEL: CPT

## 2020-03-02 PROCEDURE — 85025 COMPLETE CBC W/AUTO DIFF WBC: CPT

## 2020-03-02 PROCEDURE — 36415 COLL VENOUS BLD VENIPUNCTURE: CPT

## 2020-03-02 PROCEDURE — 84153 ASSAY OF PSA TOTAL: CPT

## 2020-03-02 PROCEDURE — 80053 COMPREHEN METABOLIC PANEL: CPT

## 2020-04-27 RX ORDER — ERGOCALCIFEROL 1.25 MG/1
CAPSULE ORAL
Qty: 12 CAPSULE | Refills: 1 | Status: SHIPPED | OUTPATIENT
Start: 2020-04-27 | End: 2021-03-25

## 2020-05-13 RX ORDER — METOPROLOL SUCCINATE 25 MG/1
25 TABLET, EXTENDED RELEASE ORAL DAILY
Qty: 90 TABLET | Refills: 1 | Status: SHIPPED | OUTPATIENT
Start: 2020-05-13 | End: 2020-11-19

## 2020-05-22 NOTE — TELEPHONE ENCOUNTER
Gastroenterology Patient needs temp handicap placard form completed, expires at end of September, reason for card difficulty walking due to osteoarthritis of both knees and lumbar disc disease, call patient when it is read to

## 2020-07-07 ENCOUNTER — OFFICE VISIT (OUTPATIENT)
Dept: SURGERY | Facility: CLINIC | Age: 65
End: 2020-07-07
Payer: MEDICARE

## 2020-07-07 VITALS
OXYGEN SATURATION: 96 % | SYSTOLIC BLOOD PRESSURE: 143 MMHG | HEIGHT: 68 IN | DIASTOLIC BLOOD PRESSURE: 73 MMHG | WEIGHT: 315 LBS | HEART RATE: 76 BPM | BODY MASS INDEX: 47.74 KG/M2

## 2020-07-07 DIAGNOSIS — Z51.81 ENCOUNTER FOR THERAPEUTIC DRUG MONITORING: ICD-10-CM

## 2020-07-07 DIAGNOSIS — E66.01 MORBID OBESITY WITH BMI OF 45.0-49.9, ADULT (HCC): ICD-10-CM

## 2020-07-07 DIAGNOSIS — I10 ESSENTIAL HYPERTENSION: ICD-10-CM

## 2020-07-07 DIAGNOSIS — G47.33 OSA (OBSTRUCTIVE SLEEP APNEA): ICD-10-CM

## 2020-07-07 DIAGNOSIS — R63.2 INCREASED APPETITE: ICD-10-CM

## 2020-07-07 DIAGNOSIS — E11.9 TYPE 2 DIABETES MELLITUS WITHOUT COMPLICATION, WITHOUT LONG-TERM CURRENT USE OF INSULIN (HCC): Primary | ICD-10-CM

## 2020-07-07 PROCEDURE — 99214 OFFICE O/P EST MOD 30 MIN: CPT | Performed by: INTERNAL MEDICINE

## 2020-07-07 NOTE — PROGRESS NOTES
[address] [place] [address]. [address]  Dept: [phone]         Patient:  Danay Ochoa  :      1955  MRN:      [id number]    Chief Complaint:  Pa tablet 1   • ERGOCALCIFEROL 1.25 MG (16136 UT) Oral Cap TAKE 1 CAPSULE (50,000 UNITS TOTAL) BY MOUTH ONCE A WEEK FOR 12 DOSES.  12 capsule 1   • Glucose Blood (ONETOUCH ULTRA BLUE) In Vitro Strip TEST TWICE DAILY AS NEEDED 200 strip 3   • ONETOUCH LANCETS D file        Relationship status: Not on file      Intimate partner violence:        Fear of current or ex partner: Not on file        Emotionally abused: Not on file        Physically abused: Not on file        Forced sexual activity: Not on file    Other day  · Length of time it takes to consume a meal:  15 min  · # of snacks per day: 1-2 Type of snacks:  nuts  · Amount of soda consumption per day:  regular  · Amount of water (in ounces) per day:  <60 oz  · Drinking between meals only:  no  · Toughest chal diagnosis)  Increased appetite  Morbid obesity with bmi of 45.0-49.9, adult ([facility])  Encounter for therapeutic drug monitoring  Maurisio (obstructive sleep apnea)  Essential hypertension    PLAN   Patient is not interested in bariatric surgery.  Patient desires to

## 2020-08-14 ENCOUNTER — OFFICE VISIT (OUTPATIENT)
Dept: OPTOMETRY | Facility: CLINIC | Age: 65
End: 2020-08-14
Payer: MEDICARE

## 2020-08-14 DIAGNOSIS — H25.13 AGE-RELATED NUCLEAR CATARACT OF BOTH EYES: ICD-10-CM

## 2020-08-14 DIAGNOSIS — E11.9 TYPE 2 DIABETES MELLITUS WITHOUT COMPLICATION, WITHOUT LONG-TERM CURRENT USE OF INSULIN (HCC): Primary | ICD-10-CM

## 2020-08-14 PROCEDURE — 92014 COMPRE OPH EXAM EST PT 1/>: CPT | Performed by: OPTOMETRIST

## 2020-08-14 NOTE — PROGRESS NOTES
Lisa Ye is a 60year old male. HPI:     HPI     Diabetic Eye Exam     Duration of 10 years. Number of years diabetic 10. Number of years on pills 10. Number of years on insulin 0. Does PT check his/her own bloodsugar: yes.   Last blood Comment: Weekly, 4 Beers;     Drug use: No      Medications:  Current Outpatient Medications   Medication Sig Dispense Refill   • metFORMIN HCl 500 MG Oral Tab TAKE 1 TABLET BY MOUTH TWICE A DAY WITH MEALS 180 tablet 1   • Pitavastatin Calcium (LIVALO) 4 M Slit Lamp and Fundus Exam     External Exam       Right Left    External Normal Normal          Slit Lamp Exam       Right Left    Lids/Lashes Meibomian gland dysfunction Meibomian gland dysfunction    Conjunctiva/Sclera Nasal/temp pinguecula Nasal/te

## 2020-08-26 ENCOUNTER — OFFICE VISIT (OUTPATIENT)
Dept: INTERNAL MEDICINE CLINIC | Facility: CLINIC | Age: 65
End: 2020-08-26
Payer: MEDICARE

## 2020-08-26 VITALS
BODY MASS INDEX: 47.74 KG/M2 | WEIGHT: 315 LBS | SYSTOLIC BLOOD PRESSURE: 128 MMHG | HEIGHT: 68 IN | HEART RATE: 84 BPM | TEMPERATURE: 96 F | DIASTOLIC BLOOD PRESSURE: 76 MMHG

## 2020-08-26 DIAGNOSIS — E11.9 TYPE 2 DIABETES MELLITUS WITHOUT COMPLICATION, WITHOUT LONG-TERM CURRENT USE OF INSULIN (HCC): Primary | ICD-10-CM

## 2020-08-26 DIAGNOSIS — I10 ESSENTIAL HYPERTENSION: ICD-10-CM

## 2020-08-26 LAB
CARTRIDGE LOT#: 564 NUMERIC
HEMOGLOBIN A1C: 7.7 % (ref 4.3–5.6)

## 2020-08-26 PROCEDURE — 99214 OFFICE O/P EST MOD 30 MIN: CPT | Performed by: INTERNAL MEDICINE

## 2020-08-26 PROCEDURE — 36416 COLLJ CAPILLARY BLOOD SPEC: CPT | Performed by: INTERNAL MEDICINE

## 2020-08-26 PROCEDURE — G0463 HOSPITAL OUTPT CLINIC VISIT: HCPCS | Performed by: INTERNAL MEDICINE

## 2020-08-26 PROCEDURE — 83036 HEMOGLOBIN GLYCOSYLATED A1C: CPT | Performed by: INTERNAL MEDICINE

## 2020-08-26 NOTE — PATIENT INSTRUCTIONS
Diet: Diabetes  Food is an important tool that you can use to control diabetes and stay healthy. Eating well-balanced meals in the correct amounts will help you control your blood glucose levels and prevent low blood sugar reactions.  It will also help you added salt. It can contribute to high blood pressure, which can cause heart disease. People with diabetes already have a risk of high blood pressure and heart disease. · Stay at a healthy weight. If you need to lose weight, cut down on your portion sizes. face, lips, or tongue  · breathing problems  · dizziness  · feeling faint or lightheaded, falls  · muscle weakness  · nausea, vomiting, unusual stomach upset or pain  · penile discharge, itching, or pain in men  · signs and symptoms of a genital infection, ketoacidosis  · diet low in salt  · eating less due to illness, surgery, dieting, or any other reason  · having surgery  · high cholesterol  · high levels of potassium in the blood  · history of pancreatitis or pancreas problems  · history of yeast infecti dose of your medicine. Do not skip meals. Ask your doctor or health care professional if you should avoid alcohol. Many nonprescription cough and cold products contain sugar or alcohol. These can affect blood sugar.   Wear a medical ID bracelet or chain, a crackers  Avoid: Salted crackers, pretzels, popcorn, Western Sheri toast, pancakes, muffins  Dairy  Ok: Milk, chocolate milk, hot chocolate mix, low-salt cheeses, and yogurt  Avoid: Processed cheese and cheese spreads;  Roquefort, Camembert, and cottage cheese; bu reserved. This information is not intended as a substitute for professional medical care. Always follow your healthcare professional's instructions.

## 2020-08-26 NOTE — PROGRESS NOTES
Patient ID: Lisa Ye is a 60year old male. Patient presents with: Follow - Up       HISTORY OF PRESENT ILLNESS:   HPI  Patient presents for above. Here for follow-up of multiple issues. History of type 2 diabetes.   A1c 6 months ago wa Performed by Ollie Collins MD at [facility] MAIN OR   • TOTAL KNEE REPLACEMENT      R Total Knee june 12, 2017         Current Outpatient Medications:   •  metFORMIN HCl 500 MG Oral Tab, Take 1,000 mg by mouth 2 (two) times daily with meals. , Disp: , Rfl: Activity      Alcohol use:  Yes        Alcohol/week: 0.0 standard drinks        Comment: Weekly, 4 Beers;       Drug use: No      Sexual activity: Not on file    Lifestyle      Physical activity:        Days per week: Not on file        Minutes per session: distension. There is no tenderness. There is no rebound. Neurological: He is alert. Psychiatric: He has a normal mood and affect. His behavior is normal.     A1c - 7.7      ASSESSMENT/PLAN:   1.  Type 2 diabetes mellitus without complication, without lo

## 2020-09-21 RX ORDER — PITAVASTATIN CALCIUM 4.18 MG/1
TABLET, FILM COATED ORAL
Qty: 90 TABLET | Refills: 0 | Status: SHIPPED | OUTPATIENT
Start: 2020-09-21 | End: 2020-11-19

## 2020-10-01 ENCOUNTER — TELEPHONE (OUTPATIENT)
Dept: INTERNAL MEDICINE CLINIC | Facility: CLINIC | Age: 65
End: 2020-10-01

## 2020-10-01 ENCOUNTER — MED REC SCAN ONLY (OUTPATIENT)
Dept: INTERNAL MEDICINE CLINIC | Facility: CLINIC | Age: 65
End: 2020-10-01

## 2020-10-02 NOTE — TELEPHONE ENCOUNTER
Message left informing patient form completed and ready for  at [facility] office .

## 2020-10-09 ENCOUNTER — OFFICE VISIT (OUTPATIENT)
Dept: SURGERY | Facility: CLINIC | Age: 65
End: 2020-10-09
Payer: MEDICARE

## 2020-10-09 VITALS
BODY MASS INDEX: 47.74 KG/M2 | HEIGHT: 68 IN | WEIGHT: 315 LBS | HEART RATE: 78 BPM | DIASTOLIC BLOOD PRESSURE: 87 MMHG | OXYGEN SATURATION: 96 % | SYSTOLIC BLOOD PRESSURE: 136 MMHG

## 2020-10-09 DIAGNOSIS — R63.2 BINGE EATING: ICD-10-CM

## 2020-10-09 DIAGNOSIS — G47.33 OSA (OBSTRUCTIVE SLEEP APNEA): ICD-10-CM

## 2020-10-09 DIAGNOSIS — Z51.81 ENCOUNTER FOR THERAPEUTIC DRUG MONITORING: ICD-10-CM

## 2020-10-09 DIAGNOSIS — E66.01 MORBID OBESITY WITH BMI OF 45.0-49.9, ADULT (HCC): ICD-10-CM

## 2020-10-09 DIAGNOSIS — E11.9 TYPE 2 DIABETES MELLITUS WITHOUT COMPLICATION, WITHOUT LONG-TERM CURRENT USE OF INSULIN (HCC): ICD-10-CM

## 2020-10-09 DIAGNOSIS — R63.2 INCREASED APPETITE: ICD-10-CM

## 2020-10-09 DIAGNOSIS — I10 ESSENTIAL HYPERTENSION: Primary | ICD-10-CM

## 2020-10-09 PROCEDURE — 99214 OFFICE O/P EST MOD 30 MIN: CPT | Performed by: INTERNAL MEDICINE

## 2020-10-09 NOTE — PROGRESS NOTES
[address] [place] [address] Salomon Meza. [address]  Dept: [phone]         Patient:  Vivienne Tabor  :      1955  MRN:      [id number]    Chief Complaint:  Pa tablet 1   • ERGOCALCIFEROL 1.25 MG (55671 UT) Oral Cap TAKE 1 CAPSULE (50,000 UNITS TOTAL) BY MOUTH ONCE A WEEK FOR 12 DOSES.  12 capsule 1   • Glucose Blood (ONETOUCH ULTRA BLUE) In Vitro Strip TEST TWICE DAILY AS NEEDED 200 strip 3   • ONETOUCH LANCETS D Relationship status: Not on file      Intimate partner violence        Fear of current or ex partner: Not on file        Emotionally abused: Not on file        Physically abused: Not on file        Forced sexual activity: Not on file    Other Topics it takes to consume a meal:  15 min  · # of snacks per day: 1-2 Type of snacks:  nuts  · Amount of soda consumption per day:  regular  · Amount of water (in ounces) per day:  <60 oz  · Drinking between meals only:  no  · Toughest challenge:  exercise    Nu current use of insulin ([facility])  Maurisio (obstructive sleep apnea)  Encounter for therapeutic drug monitoring  Morbid obesity with bmi of 45.0-49.9, adult ([facility])    PLAN   Patient is not interested in bariatric surgery.  Patient desires to pursue traditional weight

## 2020-10-14 DIAGNOSIS — E11.9 TYPE 2 DIABETES MELLITUS WITHOUT COMPLICATION, WITHOUT LONG-TERM CURRENT USE OF INSULIN (HCC): Primary | ICD-10-CM

## 2020-10-14 RX ORDER — BLOOD SUGAR DIAGNOSTIC
STRIP MISCELLANEOUS
Qty: 200 STRIP | Refills: 3 | Status: SHIPPED | OUTPATIENT
Start: 2020-10-14

## 2020-10-14 RX ORDER — LANCETS 33 GAUGE
EACH MISCELLANEOUS
Qty: 1 BOX | Refills: 3 | Status: SHIPPED | OUTPATIENT
Start: 2020-10-14

## 2020-11-19 RX ORDER — METOPROLOL SUCCINATE 25 MG/1
TABLET, EXTENDED RELEASE ORAL
Qty: 90 TABLET | Refills: 1 | Status: SHIPPED | OUTPATIENT
Start: 2020-11-19 | End: 2021-05-13

## 2020-11-19 RX ORDER — PITAVASTATIN CALCIUM 4.18 MG/1
TABLET, FILM COATED ORAL
Qty: 90 TABLET | Refills: 0 | Status: SHIPPED | OUTPATIENT
Start: 2020-11-19 | End: 2021-05-11

## 2021-01-08 ENCOUNTER — OFFICE VISIT (OUTPATIENT)
Dept: SURGERY | Facility: CLINIC | Age: 66
End: 2021-01-08
Payer: MEDICARE

## 2021-01-08 VITALS
HEIGHT: 68 IN | OXYGEN SATURATION: 95 % | BODY MASS INDEX: 47.74 KG/M2 | WEIGHT: 315 LBS | HEART RATE: 83 BPM | DIASTOLIC BLOOD PRESSURE: 83 MMHG | SYSTOLIC BLOOD PRESSURE: 142 MMHG

## 2021-01-08 DIAGNOSIS — Z51.81 ENCOUNTER FOR THERAPEUTIC DRUG MONITORING: ICD-10-CM

## 2021-01-08 DIAGNOSIS — I10 ESSENTIAL HYPERTENSION: Primary | ICD-10-CM

## 2021-01-08 DIAGNOSIS — E11.9 TYPE 2 DIABETES MELLITUS WITHOUT COMPLICATION, WITHOUT LONG-TERM CURRENT USE OF INSULIN (HCC): ICD-10-CM

## 2021-01-08 DIAGNOSIS — G47.33 OSA (OBSTRUCTIVE SLEEP APNEA): ICD-10-CM

## 2021-01-08 DIAGNOSIS — R63.2 INCREASED APPETITE: ICD-10-CM

## 2021-01-08 DIAGNOSIS — E66.01 MORBID OBESITY WITH BMI OF 45.0-49.9, ADULT (HCC): ICD-10-CM

## 2021-01-08 DIAGNOSIS — R63.2 BINGE EATING: ICD-10-CM

## 2021-01-08 PROCEDURE — 99214 OFFICE O/P EST MOD 30 MIN: CPT | Performed by: INTERNAL MEDICINE

## 2021-01-08 RX ORDER — EMPAGLIFLOZIN 10 MG/1
1 TABLET, FILM COATED ORAL DAILY
COMMUNITY
Start: 2020-11-22 | End: 2021-02-26

## 2021-01-08 NOTE — PROGRESS NOTES
[address] [place] [address]. [address]  Dept: [phone]         Patient:  Danay Ochoa  :      1955  MRN:      [id number]    Chief Complaint:  Pa strip 3   • OneTouch Delica Lancets 52W Does not apply Misc TEST TWICE DAILY AS NEEDED 1 Box 3   • metFORMIN HCl 500 MG Oral Tab Take 1,000 mg by mouth 2 (two) times daily with meals.      • ERGOCALCIFEROL 1.25 MG (69646 UT) Oral Cap TAKE 1 CAPSULE (50,000 file        Attends meetings of clubs or organizations: Not on file        Relationship status: Not on file      Intimate partner violence        Fear of current or ex partner: Not on file        Emotionally abused: Not on file        Physically abused: No Habits  · Patient states the following:  · Eats 3 meal(s) per day  · Length of time it takes to consume a meal:  15 min  · # of snacks per day: 1-2 Type of snacks:  nuts  · Amount of soda consumption per day:  diet  · Amount of water (in ounces) per day: eating  Increased appetite  Type 2 diabetes mellitus without complication, without long-term current use of insulin ([facility])  Maurisio (obstructive sleep apnea)  Encounter for therapeutic drug monitoring  Morbid obesity with bmi of 45.0-49.9, adult ([facility])    PLAN

## 2021-02-02 DIAGNOSIS — Z23 NEED FOR VACCINATION: ICD-10-CM

## 2021-02-09 ENCOUNTER — IMMUNIZATION (OUTPATIENT)
Dept: LAB | Age: 66
End: 2021-02-09
Attending: HOSPITALIST
Payer: MEDICARE

## 2021-02-09 DIAGNOSIS — Z23 NEED FOR VACCINATION: Primary | ICD-10-CM

## 2021-02-09 PROCEDURE — 0001A SARSCOV2 VAC 30MCG/0.3ML IM: CPT

## 2021-02-15 ENCOUNTER — TELEPHONE (OUTPATIENT)
Dept: PULMONOLOGY | Facility: CLINIC | Age: 66
End: 2021-02-15

## 2021-02-15 NOTE — TELEPHONE ENCOUNTER
Pt due for CT March 2021, letter sent to pt, [facility] care please advise.

## 2021-02-26 DIAGNOSIS — E11.9 TYPE 2 DIABETES MELLITUS WITHOUT COMPLICATIONS (HCC): ICD-10-CM

## 2021-02-26 RX ORDER — EMPAGLIFLOZIN 10 MG/1
TABLET, FILM COATED ORAL
Qty: 90 TABLET | Refills: 1 | Status: SHIPPED | OUTPATIENT
Start: 2021-02-26 | End: 2021-04-22 | Stop reason: DRUGHIGH

## 2021-03-03 ENCOUNTER — OFFICE VISIT (OUTPATIENT)
Dept: INTERNAL MEDICINE CLINIC | Facility: CLINIC | Age: 66
End: 2021-03-03
Payer: MEDICARE

## 2021-03-03 ENCOUNTER — IMMUNIZATION (OUTPATIENT)
Dept: LAB | Age: 66
End: 2021-03-03
Attending: HOSPITALIST
Payer: MEDICARE

## 2021-03-03 VITALS
HEIGHT: 68 IN | DIASTOLIC BLOOD PRESSURE: 87 MMHG | WEIGHT: 315 LBS | BODY MASS INDEX: 47.74 KG/M2 | HEART RATE: 80 BPM | SYSTOLIC BLOOD PRESSURE: 143 MMHG

## 2021-03-03 DIAGNOSIS — Z00.00 ENCOUNTER FOR ANNUAL HEALTH EXAMINATION: Primary | ICD-10-CM

## 2021-03-03 DIAGNOSIS — I10 ESSENTIAL HYPERTENSION: ICD-10-CM

## 2021-03-03 DIAGNOSIS — R79.89 LOW TESTOSTERONE IN MALE: ICD-10-CM

## 2021-03-03 DIAGNOSIS — E78.5 DYSLIPIDEMIA: ICD-10-CM

## 2021-03-03 DIAGNOSIS — Z12.11 COLON CANCER SCREENING: ICD-10-CM

## 2021-03-03 DIAGNOSIS — Z23 NEED FOR VACCINATION: Primary | ICD-10-CM

## 2021-03-03 DIAGNOSIS — E11.9 TYPE 2 DIABETES MELLITUS WITHOUT COMPLICATION, WITHOUT LONG-TERM CURRENT USE OF INSULIN (HCC): ICD-10-CM

## 2021-03-03 DIAGNOSIS — G47.33 OSA (OBSTRUCTIVE SLEEP APNEA): ICD-10-CM

## 2021-03-03 DIAGNOSIS — Z13.1 SCREENING FOR DIABETES MELLITUS (DM): ICD-10-CM

## 2021-03-03 PROCEDURE — 0002A SARSCOV2 VAC 30MCG/0.3ML IM: CPT

## 2021-03-03 PROCEDURE — G0438 PPPS, INITIAL VISIT: HCPCS | Performed by: INTERNAL MEDICINE

## 2021-03-03 PROCEDURE — 99213 OFFICE O/P EST LOW 20 MIN: CPT | Performed by: INTERNAL MEDICINE

## 2021-03-03 NOTE — PROGRESS NOTES
HPI:   Olamide Thompson is a 73year old male who presents for a Medicare Subsequent Annual Wellness visit (Pt already had Initial Annual Wellness). Patient presents for above.   Here for his welcome to Medicare annual wellness exam.     History things: Not at all  Feeling down, depressed, or hopeless: Not at all  PHQ-2 SCORE: 0      Advanced Directive:  He does NOT have a Living Will on file in Pending sale to [address]    Advance care planning including the explanation and discussion of advance directives standard f knees, bilateral     Essential hypertension     Primary osteoarthritis of both knees     Primary osteoarthritis of right knee     Dyslipidemia     Degenerative disc disease, lumbar     Spinal stenosis of lumbar region at multiple levels     Low testosteron Cyclobenzaprine HCl 10 MG Oral Tab, TAKE 1 TABLET(S) BY MOUTH EVERY 8 HOURS AS NEEDED FOR MUSCLE SPASMS    •  Luliconazole (LUZU) 1 % External Cream, Apply 1 Application topically daily.        MEDICAL INFORMATION:   He  has a past medical history of Back p encounter: 320 lb 6.4 oz (145.3 kg).     Medicare Hearing Assessment  (Required for AWV/SWV)    Hearing Screening    Screening Method: Questionnaire  I have a problem hearing over the telephone: No I have trouble following the conversations when two or more Cardiovascular: Normal rate, regular rhythm and normal heart sounds. Pulmonary/Chest: Effort normal and breath sounds normal. No respiratory distress. He has no wheezes. He has no rales. Abdominal: Soft.  Bowel sounds are normal. He exhibits no diste Future  -     MICROALB/CREAT RATIO, RANDOM URINE; Future  -     OPTOMETRY - INTERNAL    Dyslipidemia  -     OFFICE/OUTPT VISIT,EST,LEVL III  -     LIPID PANEL; Future  -     COMP METABOLIC PANEL (14);  Future    Low testosterone in male  -     OFFICE/OUTPT results found for: FOB No flowsheet data found.     Glaucoma Screening      Ophthalmology Visit Annually: Diabetics, FHx Glaucoma, AA>50, > 65 Data entered on: 8/14/2020   Last Dilated Eye Exam 8/14/2020       Prostate Cancer Screening      PSA  Karen Roberts

## 2021-03-04 ENCOUNTER — LAB ENCOUNTER (OUTPATIENT)
Dept: LAB | Age: 66
End: 2021-03-04
Attending: INTERNAL MEDICINE
Payer: MEDICARE

## 2021-03-04 DIAGNOSIS — Z13.1 SCREENING FOR DIABETES MELLITUS (DM): ICD-10-CM

## 2021-03-04 DIAGNOSIS — E11.9 TYPE 2 DIABETES MELLITUS WITHOUT COMPLICATION, WITHOUT LONG-TERM CURRENT USE OF INSULIN (HCC): ICD-10-CM

## 2021-03-04 DIAGNOSIS — E78.5 DYSLIPIDEMIA: ICD-10-CM

## 2021-03-04 DIAGNOSIS — R79.89 LOW TESTOSTERONE IN MALE: ICD-10-CM

## 2021-03-04 DIAGNOSIS — Z00.00 ENCOUNTER FOR ANNUAL HEALTH EXAMINATION: ICD-10-CM

## 2021-03-04 LAB
ALBUMIN SERPL-MCNC: 3.7 G/DL (ref 3.4–5)
ALBUMIN/GLOB SERPL: 0.7 {RATIO} (ref 1–2)
ALP LIVER SERPL-CCNC: 91 U/L
ALT SERPL-CCNC: 34 U/L
ANION GAP SERPL CALC-SCNC: 5 MMOL/L (ref 0–18)
AST SERPL-CCNC: 14 U/L (ref 15–37)
BASOPHILS # BLD AUTO: 0.09 X10(3) UL (ref 0–0.2)
BASOPHILS NFR BLD AUTO: 1 %
BILIRUB SERPL-MCNC: 0.7 MG/DL (ref 0.1–2)
BUN BLD-MCNC: 18 MG/DL (ref 7–18)
BUN/CREAT SERPL: 15.1 (ref 10–20)
CALCIUM BLD-MCNC: 9.2 MG/DL (ref 8.5–10.1)
CHLORIDE SERPL-SCNC: 106 MMOL/L (ref 98–112)
CHOLEST SMN-MCNC: 188 MG/DL (ref ?–200)
CO2 SERPL-SCNC: 29 MMOL/L (ref 21–32)
CREAT BLD-MCNC: 1.19 MG/DL
CREAT UR-SCNC: 138 MG/DL
DEPRECATED RDW RBC AUTO: 43.4 FL (ref 35.1–46.3)
EOSINOPHIL # BLD AUTO: 0.15 X10(3) UL (ref 0–0.7)
EOSINOPHIL NFR BLD AUTO: 1.6 %
ERYTHROCYTE [DISTWIDTH] IN BLOOD BY AUTOMATED COUNT: 13.1 % (ref 11–15)
EST. AVERAGE GLUCOSE BLD GHB EST-MCNC: 163 MG/DL (ref 68–126)
GLOBULIN PLAS-MCNC: 5.1 G/DL (ref 2.8–4.4)
GLUCOSE BLD-MCNC: 148 MG/DL (ref 70–99)
HBA1C MFR BLD HPLC: 7.3 % (ref ?–5.7)
HCT VFR BLD AUTO: 48.4 %
HDLC SERPL-MCNC: 47 MG/DL (ref 40–59)
HGB BLD-MCNC: 16.4 G/DL
IMM GRANULOCYTES # BLD AUTO: 0.03 X10(3) UL (ref 0–1)
IMM GRANULOCYTES NFR BLD: 0.3 %
LDLC SERPL CALC-MCNC: 111 MG/DL (ref ?–100)
LYMPHOCYTES # BLD AUTO: 2.4 X10(3) UL (ref 1–4)
LYMPHOCYTES NFR BLD AUTO: 26.3 %
M PROTEIN MFR SERPL ELPH: 8.8 G/DL (ref 6.4–8.2)
MCH RBC QN AUTO: 30.8 PG (ref 26–34)
MCHC RBC AUTO-ENTMCNC: 33.9 G/DL (ref 31–37)
MCV RBC AUTO: 91 FL
MICROALBUMIN UR-MCNC: 3.21 MG/DL
MICROALBUMIN/CREAT 24H UR-RTO: 23.3 UG/MG (ref ?–30)
MONOCYTES # BLD AUTO: 0.69 X10(3) UL (ref 0.1–1)
MONOCYTES NFR BLD AUTO: 7.6 %
NEUTROPHILS # BLD AUTO: 5.76 X10 (3) UL (ref 1.5–7.7)
NEUTROPHILS # BLD AUTO: 5.76 X10(3) UL (ref 1.5–7.7)
NEUTROPHILS NFR BLD AUTO: 63.2 %
NONHDLC SERPL-MCNC: 141 MG/DL (ref ?–130)
OSMOLALITY SERPL CALC.SUM OF ELEC: 295 MOSM/KG (ref 275–295)
PATIENT FASTING Y/N/NP: YES
PATIENT FASTING Y/N/NP: YES
PLATELET # BLD AUTO: 299 10(3)UL (ref 150–450)
POTASSIUM SERPL-SCNC: 4.4 MMOL/L (ref 3.5–5.1)
PSA SERPL-MCNC: 1.23 NG/ML (ref ?–4)
RBC # BLD AUTO: 5.32 X10(6)UL
SODIUM SERPL-SCNC: 140 MMOL/L (ref 136–145)
TRIGL SERPL-MCNC: 149 MG/DL (ref 30–149)
TSI SER-ACNC: 2.55 MIU/ML (ref 0.36–3.74)
VLDLC SERPL CALC-MCNC: 30 MG/DL (ref 0–30)
WBC # BLD AUTO: 9.1 X10(3) UL (ref 4–11)

## 2021-03-04 PROCEDURE — 83036 HEMOGLOBIN GLYCOSYLATED A1C: CPT

## 2021-03-04 PROCEDURE — 82043 UR ALBUMIN QUANTITATIVE: CPT

## 2021-03-04 PROCEDURE — 84402 ASSAY OF FREE TESTOSTERONE: CPT

## 2021-03-04 PROCEDURE — 36415 COLL VENOUS BLD VENIPUNCTURE: CPT

## 2021-03-04 PROCEDURE — 80053 COMPREHEN METABOLIC PANEL: CPT

## 2021-03-04 PROCEDURE — 84403 ASSAY OF TOTAL TESTOSTERONE: CPT

## 2021-03-04 PROCEDURE — 84443 ASSAY THYROID STIM HORMONE: CPT

## 2021-03-04 PROCEDURE — 82570 ASSAY OF URINE CREATININE: CPT

## 2021-03-04 PROCEDURE — 80061 LIPID PANEL: CPT

## 2021-03-04 PROCEDURE — 84153 ASSAY OF PSA TOTAL: CPT

## 2021-03-04 PROCEDURE — 85025 COMPLETE CBC W/AUTO DIFF WBC: CPT

## 2021-03-09 LAB
TESTOSTERONE, FREE, S: 5.66 NG/DL
TESTOSTERONE, TOTAL, S: 149 NG/DL

## 2021-03-25 RX ORDER — ERGOCALCIFEROL 1.25 MG/1
CAPSULE ORAL
Qty: 12 CAPSULE | Refills: 1 | Status: SHIPPED | OUTPATIENT
Start: 2021-03-25 | End: 2021-09-10

## 2021-04-09 ENCOUNTER — OFFICE VISIT (OUTPATIENT)
Dept: SURGERY | Facility: CLINIC | Age: 66
End: 2021-04-09
Payer: MEDICARE

## 2021-04-09 VITALS
SYSTOLIC BLOOD PRESSURE: 142 MMHG | BODY MASS INDEX: 47.74 KG/M2 | HEART RATE: 86 BPM | HEIGHT: 68 IN | OXYGEN SATURATION: 96 % | WEIGHT: 315 LBS | DIASTOLIC BLOOD PRESSURE: 83 MMHG

## 2021-04-09 DIAGNOSIS — R63.2 INCREASED APPETITE: ICD-10-CM

## 2021-04-09 DIAGNOSIS — E11.9 TYPE 2 DIABETES MELLITUS WITHOUT COMPLICATION, WITHOUT LONG-TERM CURRENT USE OF INSULIN (HCC): ICD-10-CM

## 2021-04-09 DIAGNOSIS — I10 ESSENTIAL HYPERTENSION: Primary | ICD-10-CM

## 2021-04-09 DIAGNOSIS — E66.01 MORBID OBESITY WITH BMI OF 45.0-49.9, ADULT (HCC): ICD-10-CM

## 2021-04-09 PROCEDURE — 99213 OFFICE O/P EST LOW 20 MIN: CPT | Performed by: INTERNAL MEDICINE

## 2021-04-09 NOTE — PROGRESS NOTES
[address] [place] [address]. [address]  Dept: [phone]         Patient:  Harper Lennon  :      1955  MRN:      [id number]    Chief Complaint:  Pa tablet 1   • METOPROLOL SUCCINATE ER 25 MG Oral Tablet 24 Hr TAKE 1 TABLET BY MOUTH EVERY DAY 90 tablet 1   • LIVALO 4 MG Oral Tab TAKE 1 TABLET BY MOUTH EVERY DAY IN THE EVENING 90 tablet 0   • ONETOUCH ULTRA In Vitro Strip TEST TWICE DAILY AS NEEDED 200 Expenses:   Food Insecurity:       Worried About [employer] in the Last Year:       Ran Out of Food in the Last Year:   Transportation Needs:       Lack of Transportation (Medical):       Lack of Transportation (Non-Medical):   Physical Activity: day  · Length of time it takes to consume a meal:  15 min  · # of snacks per day: 1-2 Type of snacks:  nuts  · Amount of soda consumption per day:  diet  · Amount of water (in ounces) per day:  <60 oz  · Drinking between meals only:  no  · Toughest challen diagnosis)  Binge eating  Type 2 diabetes mellitus without complication, without long-term current use of insulin (hcc)  Increased appetite  Morbid obesity with bmi of 45.0-49.9, adult (hcc)    PLAN   Patient is not interested in bariatric surgery.  Patient

## 2021-04-13 ENCOUNTER — TELEPHONE (OUTPATIENT)
Dept: INTERNAL MEDICINE CLINIC | Facility: CLINIC | Age: 66
End: 2021-04-13

## 2021-04-13 NOTE — TELEPHONE ENCOUNTER
Patient is requesting a new handy cap placer. Patient would like to  today or tomorrow if possible, due to his current one expiring at the end of this month.

## 2021-04-13 NOTE — TELEPHONE ENCOUNTER
Pt notified form completed and sent via [address] [place] [address] [address]. Verbalized understanding.

## 2021-04-15 NOTE — TELEPHONE ENCOUNTER
patient calling and states that the FORM that he received via [employer] is nit the right form . Requesting to fill up another form and call him back. Office staff=please see above and advise, different FORM ? ?

## 2021-04-15 NOTE — TELEPHONE ENCOUNTER
Patient contacted and stated the form that was sent on [employer] is no longer there. Pt informed form is still on his chart, informed patient I could print it out if he wanted to come pick-up copy.  Patient agreed to plan stated will come later today to pick

## 2021-04-22 ENCOUNTER — TELEPHONE (OUTPATIENT)
Dept: INTERNAL MEDICINE CLINIC | Facility: CLINIC | Age: 66
End: 2021-04-22

## 2021-04-22 DIAGNOSIS — E11.9 TYPE 2 DIABETES MELLITUS WITHOUT COMPLICATION, WITHOUT LONG-TERM CURRENT USE OF INSULIN (HCC): Primary | ICD-10-CM

## 2021-04-22 RX ORDER — EMPAGLIFLOZIN 25 MG/1
25 TABLET, FILM COATED ORAL DAILY
Qty: 90 TABLET | Refills: 0 | Status: SHIPPED | OUTPATIENT
Start: 2021-04-22 | End: 2021-07-19

## 2021-04-22 RX ORDER — EMPAGLIFLOZIN 25 MG/1
TABLET, FILM COATED ORAL
Qty: 30 TABLET | Refills: 0 | Status: CANCELLED | OUTPATIENT
Start: 2021-04-22 | End: 2021-05-22

## 2021-04-22 NOTE — TELEPHONE ENCOUNTER
Dr. Jeremiah Piedra, patient is requesting a script for Jardiance 25mg. Back to Top    [place],     Your labs are attached. You still have a few abnormalities. Your A1c remains elevated. I would like to increase your Jardiance from 10 mg up to 25 mg.  Your LDL joselo

## 2021-05-11 RX ORDER — PITAVASTATIN CALCIUM 4.18 MG/1
TABLET, FILM COATED ORAL
Qty: 90 TABLET | Refills: 0 | Status: SHIPPED | OUTPATIENT
Start: 2021-05-11 | End: 2021-08-06

## 2021-05-13 RX ORDER — METOPROLOL SUCCINATE 25 MG/1
TABLET, EXTENDED RELEASE ORAL
Qty: 90 TABLET | Refills: 1 | Status: SHIPPED | OUTPATIENT
Start: 2021-05-13

## 2021-07-02 ENCOUNTER — OFFICE VISIT (OUTPATIENT)
Dept: SURGERY | Facility: CLINIC | Age: 66
End: 2021-07-02
Payer: MEDICARE

## 2021-07-02 VITALS
DIASTOLIC BLOOD PRESSURE: 85 MMHG | WEIGHT: 315 LBS | SYSTOLIC BLOOD PRESSURE: 144 MMHG | BODY MASS INDEX: 47.74 KG/M2 | OXYGEN SATURATION: 96 % | HEIGHT: 68 IN | HEART RATE: 81 BPM

## 2021-07-02 DIAGNOSIS — E11.9 TYPE 2 DIABETES MELLITUS WITHOUT COMPLICATION, WITHOUT LONG-TERM CURRENT USE OF INSULIN (HCC): ICD-10-CM

## 2021-07-02 DIAGNOSIS — I10 ESSENTIAL HYPERTENSION: ICD-10-CM

## 2021-07-02 DIAGNOSIS — E66.01 MORBID OBESITY WITH BMI OF 45.0-49.9, ADULT (HCC): ICD-10-CM

## 2021-07-02 DIAGNOSIS — R63.2 BINGE EATING: Primary | ICD-10-CM

## 2021-07-02 DIAGNOSIS — Z51.81 ENCOUNTER FOR THERAPEUTIC DRUG MONITORING: ICD-10-CM

## 2021-07-02 PROCEDURE — 99214 OFFICE O/P EST MOD 30 MIN: CPT | Performed by: INTERNAL MEDICINE

## 2021-07-02 RX ORDER — TOPIRAMATE 25 MG/1
25 TABLET ORAL EVERY EVENING
Qty: 30 TABLET | Refills: 2 | Status: SHIPPED | OUTPATIENT
Start: 2021-07-02 | End: 2021-09-10

## 2021-07-02 NOTE — PROGRESS NOTES
[address] [place] [address] Salomon Doe. Patricia Aponte [address], [address]  Dept: [phone]         Patient:  Rosi Grubbs  :      1955  MRN:      [id number]    Chief Complaint:  Pa TWICE A DAY WITH MEALS 180 tablet 1   • ONETOUCH ULTRA In Vitro Strip TEST TWICE DAILY AS NEEDED 200 strip 3   • OneTouch Delica Lancets 53F Does not apply Misc TEST TWICE DAILY AS NEEDED 1 Box 3   • Naproxen Sodium (ALEVE) 220 MG Oral Tab Take 1-2 tablets   Lack of Transportation (Medical):       Lack of Transportation (Non-Medical):   Physical Activity:       Days of Exercise per Week:       Minutes of Exercise per Session:   Stress:       Feeling of Stress :   Social Connections:       Frequency of Commun gms per day, Eat 100-200 calories within 1 hour of waking  and Eat 3-4 cups of fresh fruits or vegetables daily    Behavior Modifications Reviewed and Discussed  Eat breakfast, Eat 3 meals per day, Plan meals in advance, Read nutrition labels, Drink 64 oz loss at this time.       HYPERTENSION: Blood pressure stable on the above medications.  No interval change in antihypertensive medication; per Dr. Jacky Hernández notes- has hx of non-compliance with BP meds     DYSLIPIDEMIA: Stable on the above prescribed meal mirna

## 2021-07-19 DIAGNOSIS — E11.9 TYPE 2 DIABETES MELLITUS WITHOUT COMPLICATION, WITHOUT LONG-TERM CURRENT USE OF INSULIN (HCC): ICD-10-CM

## 2021-07-19 RX ORDER — EMPAGLIFLOZIN 25 MG/1
TABLET, FILM COATED ORAL
Qty: 90 TABLET | Refills: 0 | Status: SHIPPED | OUTPATIENT
Start: 2021-07-19 | End: 2021-09-29

## 2021-07-20 ENCOUNTER — TELEPHONE (OUTPATIENT)
Dept: PULMONOLOGY | Facility: CLINIC | Age: 66
End: 2021-07-20

## 2021-08-06 RX ORDER — PITAVASTATIN CALCIUM 4.18 MG/1
TABLET, FILM COATED ORAL
Qty: 90 TABLET | Refills: 1 | Status: SHIPPED | OUTPATIENT
Start: 2021-08-06

## 2021-08-06 NOTE — TELEPHONE ENCOUNTER
Refill passed per [facility], [facility] protocol.     Requested Prescriptions   Pending Prescriptions Disp Refills    LIVALO 4 MG Oral Tab [Pharmacy Med Name: LIVALO 4 MG TABLET] 90 tablet 0     Sig: TAKE 1 TABLET BY MOUTH EVERY DAY IN THE EVENING        Kari

## 2021-09-10 ENCOUNTER — OFFICE VISIT (OUTPATIENT)
Dept: SURGERY | Facility: CLINIC | Age: 66
End: 2021-09-10
Payer: MEDICARE

## 2021-09-10 VITALS
HEIGHT: 68 IN | BODY MASS INDEX: 47.74 KG/M2 | OXYGEN SATURATION: 97 % | HEART RATE: 78 BPM | DIASTOLIC BLOOD PRESSURE: 80 MMHG | SYSTOLIC BLOOD PRESSURE: 138 MMHG | WEIGHT: 315 LBS

## 2021-09-10 DIAGNOSIS — E11.9 TYPE 2 DIABETES MELLITUS WITHOUT COMPLICATION, WITHOUT LONG-TERM CURRENT USE OF INSULIN (HCC): Primary | ICD-10-CM

## 2021-09-10 DIAGNOSIS — Z51.81 ENCOUNTER FOR THERAPEUTIC DRUG MONITORING: ICD-10-CM

## 2021-09-10 DIAGNOSIS — E66.01 MORBID OBESITY WITH BMI OF 45.0-49.9, ADULT (HCC): ICD-10-CM

## 2021-09-10 DIAGNOSIS — I10 ESSENTIAL HYPERTENSION: ICD-10-CM

## 2021-09-10 DIAGNOSIS — R63.2 BINGE EATING: ICD-10-CM

## 2021-09-10 PROCEDURE — 99214 OFFICE O/P EST MOD 30 MIN: CPT | Performed by: INTERNAL MEDICINE

## 2021-09-10 RX ORDER — TOPIRAMATE 25 MG/1
25 TABLET ORAL 2 TIMES DAILY
Qty: 180 TABLET | Refills: 1 | Status: SHIPPED | OUTPATIENT
Start: 2021-09-10 | End: 2022-01-10

## 2021-09-10 RX ORDER — ERGOCALCIFEROL 1.25 MG/1
50000 CAPSULE ORAL WEEKLY
Qty: 12 CAPSULE | Refills: 2 | Status: SHIPPED | OUTPATIENT
Start: 2021-09-10

## 2021-09-10 NOTE — PROGRESS NOTES
[address] [place] [address] [place], [address], [address]  Dept: [phone]         Patient:  Mortimer Griffon  :      1955  MRN:      [id number]    Chief Complaint:  Pa • METOPROLOL SUCCINATE ER 25 MG Oral Tablet 24 Hr TAKE 1 TABLET BY MOUTH EVERY DAY 90 tablet 1   • ONETOUCH ULTRA In Vitro Strip TEST TWICE DAILY AS NEEDED 200 strip 3   • OneTouch Delica Lancets 04M Does not apply Misc TEST TWICE DAILY AS NEEDED 1 Box 3 Ran Out of Food in the Last Year:   Transportation Needs:       Lack of Transportation (Medical):       Lack of Transportation (Non-Medical):   Physical Activity:       Days of Exercise per Week:       Minutes of Exercise per Session:   Stress:       Feeli exercise    Nutritional Goals  Limit carbohydrates to 100 gms per day, Eat 100-200 calories within 1 hour of waking  and Eat 3-4 cups of fresh fruits or vegetables daily    Behavior Modifications Reviewed and Discussed  Eat breakfast, Eat 3 meals per day, bariatric surgery. Patient desires to pursue traditional weight loss at this time.       HYPERTENSION: Blood pressure stable on the above medications.  No interval change in antihypertensive medication; per Dr. Elzbieta Dominique notes- has hx of non-compliance with B

## 2021-09-15 ENCOUNTER — OFFICE VISIT (OUTPATIENT)
Dept: INTERNAL MEDICINE CLINIC | Facility: CLINIC | Age: 66
End: 2021-09-15
Payer: MEDICARE

## 2021-09-15 ENCOUNTER — MED REC SCAN ONLY (OUTPATIENT)
Dept: INTERNAL MEDICINE CLINIC | Facility: CLINIC | Age: 66
End: 2021-09-15

## 2021-09-15 VITALS
SYSTOLIC BLOOD PRESSURE: 122 MMHG | HEART RATE: 73 BPM | BODY MASS INDEX: 47.74 KG/M2 | WEIGHT: 315 LBS | DIASTOLIC BLOOD PRESSURE: 76 MMHG | HEIGHT: 68 IN

## 2021-09-15 DIAGNOSIS — E11.9 TYPE 2 DIABETES MELLITUS WITHOUT COMPLICATION, WITHOUT LONG-TERM CURRENT USE OF INSULIN (HCC): ICD-10-CM

## 2021-09-15 DIAGNOSIS — I10 ESSENTIAL HYPERTENSION: Primary | ICD-10-CM

## 2021-09-15 DIAGNOSIS — E78.5 DYSLIPIDEMIA: ICD-10-CM

## 2021-09-15 DIAGNOSIS — Z02.89 ENCOUNTER FOR COMPLETION OF FORM WITH PATIENT: ICD-10-CM

## 2021-09-15 DIAGNOSIS — E66.01 MORBID OBESITY WITH BMI OF 45.0-49.9, ADULT (HCC): ICD-10-CM

## 2021-09-15 PROCEDURE — 99214 OFFICE O/P EST MOD 30 MIN: CPT | Performed by: INTERNAL MEDICINE

## 2021-09-15 NOTE — PATIENT INSTRUCTIONS
Low-Salt Diet  This diet removes foods that are high in salt. It also limits the amount of salt you use when cooking. It is most often used for people with high blood pressure, edema (fluid retention), and kidney, liver, or heart disease.   Table salt conta coffees, electrolyte replacement drinks, sports drinks  Meats  Ok: All fresh meat, fish, poultry, low-salt tuna, eggs, egg substitute  Avoid: Smoked, pickled, brine-cured, or salted meats and fish.  This includes philip, chipped beef, corned beef, hot dogs,

## 2021-09-15 NOTE — PROGRESS NOTES
Patient ID: Lakesha Patel is a 73year old male. Patient presents with: Follow - Up: 6 month follow up        HISTORY OF PRESENT ILLNESS:   HPI  Patient presents for above. Here for 6-month follow-up. History of hypertension on medications. KNEE REPLACEMENT SURGERY     • TOTAL KNEE REPLACEMENT      R Total Knee june 12, 2017         Current Outpatient Medications:   •  topiramate 25 MG Oral Tab, Take 1 tablet (25 mg total) by mouth 2 (two) times daily. , Disp: 180 tablet, Rfl: 1  •  ergocalcif activity: Not on file    Other Topics      Concerns:         Service: Not Asked        Blood Transfusions: Not Asked        Caffeine Concern: Yes          Coffee, 2 cups daily;          Occupational Exposure: Not Asked        Hobby Hazards: Not Aske kg)   Height: 5' 8\" (1.727 m)       Body mass index is 48.66 kg/m². Physical Exam  Constitutional:       Appearance: Normal appearance. Eyes:      General: No scleral icterus. Cardiovascular:      Rate and Rhythm: Normal rate and regular rhythm.

## 2021-09-16 ENCOUNTER — LAB ENCOUNTER (OUTPATIENT)
Dept: LAB | Age: 66
End: 2021-09-16
Attending: INTERNAL MEDICINE
Payer: MEDICARE

## 2021-09-16 DIAGNOSIS — E78.5 DYSLIPIDEMIA: ICD-10-CM

## 2021-09-16 DIAGNOSIS — E11.9 TYPE 2 DIABETES MELLITUS WITHOUT COMPLICATION, WITHOUT LONG-TERM CURRENT USE OF INSULIN (HCC): ICD-10-CM

## 2021-09-16 LAB
CHOLEST SERPL-MCNC: 161 MG/DL (ref ?–200)
EST. AVERAGE GLUCOSE BLD GHB EST-MCNC: 151 MG/DL (ref 68–126)
HBA1C MFR BLD HPLC: 6.9 % (ref ?–5.7)
HDLC SERPL-MCNC: 41 MG/DL (ref 40–59)
LDLC SERPL CALC-MCNC: 94 MG/DL (ref ?–100)
NONHDLC SERPL-MCNC: 120 MG/DL (ref ?–130)
PATIENT FASTING Y/N/NP: YES
TRIGL SERPL-MCNC: 146 MG/DL (ref 30–149)
VLDLC SERPL CALC-MCNC: 24 MG/DL (ref 0–30)

## 2021-09-16 PROCEDURE — 36415 COLL VENOUS BLD VENIPUNCTURE: CPT

## 2021-09-16 PROCEDURE — 80061 LIPID PANEL: CPT

## 2021-09-16 PROCEDURE — 83036 HEMOGLOBIN GLYCOSYLATED A1C: CPT

## 2021-09-29 ENCOUNTER — OFFICE VISIT (OUTPATIENT)
Dept: INTERNAL MEDICINE CLINIC | Facility: CLINIC | Age: 66
End: 2021-09-29
Payer: MEDICARE

## 2021-09-29 VITALS
HEIGHT: 68 IN | DIASTOLIC BLOOD PRESSURE: 78 MMHG | SYSTOLIC BLOOD PRESSURE: 128 MMHG | BODY MASS INDEX: 47.74 KG/M2 | WEIGHT: 315 LBS | HEART RATE: 71 BPM

## 2021-09-29 DIAGNOSIS — T14.8XXA PULLED MUSCLE: Primary | ICD-10-CM

## 2021-09-29 DIAGNOSIS — E11.9 TYPE 2 DIABETES MELLITUS WITHOUT COMPLICATION, WITHOUT LONG-TERM CURRENT USE OF INSULIN (HCC): ICD-10-CM

## 2021-09-29 PROCEDURE — 99213 OFFICE O/P EST LOW 20 MIN: CPT | Performed by: INTERNAL MEDICINE

## 2021-09-29 RX ORDER — EMPAGLIFLOZIN 25 MG/1
1 TABLET, FILM COATED ORAL DAILY
Qty: 90 TABLET | Refills: 1 | Status: SHIPPED | OUTPATIENT
Start: 2021-09-29

## 2021-09-29 NOTE — PROGRESS NOTES
Patient ID: Rosi Grubbs is a 73year old male. Patient presents with: Injury: Pt reports to office due to a possible pulled muscle on Wednesday last week. HISTORY OF PRESENT ILLNESS:   HPI  Patient presents for above.   Patient was on a Take 1 tablet by mouth daily. , Disp: 90 tablet, Rfl: 1  •  topiramate 25 MG Oral Tab, Take 1 tablet (25 mg total) by mouth 2 (two) times daily. , Disp: 180 tablet, Rfl: 1  •  ergocalciferol 1.25 MG (29661 UT) Oral Cap, Take 1 capsule (50,000 Units total) by Asked        Stress Concern: Not Asked        Weight Concern: Not Asked        Special Diet: Not Asked        Back Care: Not Asked        Exercise: Not Asked        Bike Helmet: Not Asked        Seat Belt: Not Asked        Self-Exams: Not Asked    Social H murmur heard. Pulmonary:      Effort: Pulmonary effort is normal. No respiratory distress. Breath sounds: Normal breath sounds. Abdominal:      General: Abdomen is flat. Bowel sounds are normal.      Palpations: Abdomen is soft.       Hernia: No

## 2021-10-20 ENCOUNTER — TELEPHONE (OUTPATIENT)
Dept: PULMONOLOGY | Facility: CLINIC | Age: 66
End: 2021-10-20

## 2021-10-21 NOTE — TELEPHONE ENCOUNTER
Spoke with patient informed him of Dr. Tate Alexis CPAP recall recommendation, \"The device is very safe to use as long as they do not use an ozone based cleaning system.   It is thought that the ozone cleaning system resulted in premature degradation of one of

## 2021-11-23 ENCOUNTER — OFFICE VISIT (OUTPATIENT)
Dept: OPTOMETRY | Facility: CLINIC | Age: 66
End: 2021-11-23
Payer: MEDICARE

## 2021-11-23 DIAGNOSIS — E11.9 TYPE 2 DIABETES MELLITUS WITHOUT COMPLICATION, WITHOUT LONG-TERM CURRENT USE OF INSULIN (HCC): Primary | ICD-10-CM

## 2021-11-23 DIAGNOSIS — H25.13 AGE-RELATED NUCLEAR CATARACT OF BOTH EYES: ICD-10-CM

## 2021-11-23 PROCEDURE — 92014 COMPRE OPH EXAM EST PT 1/>: CPT | Performed by: OPTOMETRIST

## 2021-11-23 NOTE — PROGRESS NOTES
Jim Cabrera is a 73year old male.     HPI:     HPI     Diabetic Eye Exam     Diabetes Type: Type 2, controlled with diet and taking oral medications    Duration: 11 years    Number of years diabetic: 11    Number of years on pills: 11    Number drinks      Comment: Weekly, 4 Beers;     Drug use: No      Medications:  Current Outpatient Medications   Medication Sig Dispense Refill   • Empagliflozin (JARDIANCE) 25 MG Oral Tab Take 1 tablet by mouth daily.  90 tablet 1   • topiramate 25 MG Oral Tab T Mood/Affect: Normal          Dilation     Both eyes: 1.0% Mydriacyl and 2.5% Logan Synephrine @ 8:13 AM            Additional Tests     Amsler       Right Left     Normal Normal            Slit Lamp and Fundus Exam     External Exam       Right Left    Exter Follow up instructions:  Return in about 1 year (around 11/23/2022) for Diabetic Eye exam.    11/23/2021  Scribed by: Anatoly Fernando

## 2021-12-17 ENCOUNTER — OFFICE VISIT (OUTPATIENT)
Dept: SURGERY | Facility: CLINIC | Age: 66
End: 2021-12-17
Payer: MEDICARE

## 2021-12-17 VITALS
HEIGHT: 68 IN | SYSTOLIC BLOOD PRESSURE: 137 MMHG | DIASTOLIC BLOOD PRESSURE: 85 MMHG | HEART RATE: 71 BPM | OXYGEN SATURATION: 97 % | WEIGHT: 315 LBS | BODY MASS INDEX: 47.74 KG/M2

## 2021-12-17 DIAGNOSIS — R63.2 BINGE EATING: ICD-10-CM

## 2021-12-17 DIAGNOSIS — E11.9 TYPE 2 DIABETES MELLITUS WITHOUT COMPLICATION, WITHOUT LONG-TERM CURRENT USE OF INSULIN (HCC): Primary | ICD-10-CM

## 2021-12-17 DIAGNOSIS — E66.01 MORBID OBESITY WITH BMI OF 45.0-49.9, ADULT (HCC): ICD-10-CM

## 2021-12-17 DIAGNOSIS — I10 ESSENTIAL HYPERTENSION: ICD-10-CM

## 2021-12-17 DIAGNOSIS — Z51.81 ENCOUNTER FOR THERAPEUTIC DRUG MONITORING: ICD-10-CM

## 2021-12-17 PROCEDURE — 99214 OFFICE O/P EST MOD 30 MIN: CPT | Performed by: INTERNAL MEDICINE

## 2021-12-17 NOTE — PROGRESS NOTES
[address] [place] [address] Salomon Mas. [address]  Dept: [phone]         Patient:  Kiran Clements  :      1955  MRN:      [id number]    Chief Complaint:  Pa tablet 1   • ONETOUCH ULTRA In Vitro Strip TEST TWICE DAILY AS NEEDED 200 strip 3   • OneTouch Delica Lancets 41B Does not apply Misc TEST TWICE DAILY AS NEEDED 1 Box 3   • Naproxen Sodium 220 MG Oral Tab Take 1-2 tablets by mouth 2 (two) times daily. Procedure Laterality Date   • ARTHROSCOPY OF JOINT UNLISTED Left 2013   • ARTHROSCOPY OF JOINT UNLISTED Right    • COLONOSCOPY      last colonoscopy 10 yrs ago   • COLONOSCOPY N/A 11/29/2017    Procedure: COLONOSCOPY;  Surgeon: Beti Owen MD; Cardiovascular: Negative. Gastrointestinal: Negative. Genitourinary: Negative. Skin: Negative. Neurological: Negative. Psychiatric/Behavioral: Negative. Physical Exam:   Physical Exam  Vitals reviewed.    Constitutional:       Aamir Avalos of water per day. No fruit juices or regular soda. 3. Increase aerobic exercises (goal is 150 minutes per week)  4. Increase fruit and vegetable servings/day  5. Keep carbs at or below 100g/day  6. Avoid skipping meals  7.  Prepare meals and snacks in adva

## 2022-01-04 ENCOUNTER — TELEPHONE (OUTPATIENT)
Dept: SURGERY | Facility: CLINIC | Age: 67
End: 2022-01-04

## 2022-01-04 DIAGNOSIS — E66.01 MORBID OBESITY WITH BMI OF 45.0-49.9, ADULT (HCC): ICD-10-CM

## 2022-01-04 DIAGNOSIS — E11.9 TYPE 2 DIABETES MELLITUS WITHOUT COMPLICATION, WITHOUT LONG-TERM CURRENT USE OF INSULIN (HCC): Primary | ICD-10-CM

## 2022-01-04 RX ORDER — SEMAGLUTIDE 1.34 MG/ML
0.25 INJECTION, SOLUTION SUBCUTANEOUS
Qty: 4.5 ML | Refills: 1 | Status: SHIPPED | OUTPATIENT
Start: 2022-01-04 | End: 2022-04-04

## 2022-01-10 RX ORDER — TOPIRAMATE 25 MG/1
TABLET ORAL
Qty: 180 TABLET | Refills: 1 | Status: SHIPPED | OUTPATIENT
Start: 2022-01-10

## 2022-01-25 ENCOUNTER — TELEPHONE (OUTPATIENT)
Dept: PULMONOLOGY | Facility: CLINIC | Age: 67
End: 2022-01-25

## 2022-01-25 NOTE — TELEPHONE ENCOUNTER
Patient has not completed CT Chest ordered on 3/2020.  Second attempt, will mail out certified letter

## 2022-03-09 ENCOUNTER — OFFICE VISIT (OUTPATIENT)
Dept: SURGERY | Facility: CLINIC | Age: 67
End: 2022-03-09
Payer: MEDICARE

## 2022-03-09 VITALS
BODY MASS INDEX: 47.46 KG/M2 | SYSTOLIC BLOOD PRESSURE: 128 MMHG | OXYGEN SATURATION: 98 % | HEIGHT: 68 IN | WEIGHT: 313.13 LBS | HEART RATE: 87 BPM | DIASTOLIC BLOOD PRESSURE: 80 MMHG

## 2022-03-09 DIAGNOSIS — Z51.81 ENCOUNTER FOR THERAPEUTIC DRUG MONITORING: ICD-10-CM

## 2022-03-09 DIAGNOSIS — I10 ESSENTIAL HYPERTENSION: ICD-10-CM

## 2022-03-09 DIAGNOSIS — E11.9 TYPE 2 DIABETES MELLITUS WITHOUT COMPLICATION, WITHOUT LONG-TERM CURRENT USE OF INSULIN (HCC): Primary | ICD-10-CM

## 2022-03-09 DIAGNOSIS — E66.01 MORBID OBESITY WITH BMI OF 45.0-49.9, ADULT (HCC): ICD-10-CM

## 2022-03-09 DIAGNOSIS — R63.2 BINGE EATING: ICD-10-CM

## 2022-03-09 PROCEDURE — 99214 OFFICE O/P EST MOD 30 MIN: CPT | Performed by: INTERNAL MEDICINE

## 2022-03-18 ENCOUNTER — OFFICE VISIT (OUTPATIENT)
Dept: INTERNAL MEDICINE CLINIC | Facility: CLINIC | Age: 67
End: 2022-03-18
Payer: MEDICARE

## 2022-03-18 ENCOUNTER — MED REC SCAN ONLY (OUTPATIENT)
Dept: INTERNAL MEDICINE CLINIC | Facility: CLINIC | Age: 67
End: 2022-03-18

## 2022-03-18 VITALS
DIASTOLIC BLOOD PRESSURE: 81 MMHG | SYSTOLIC BLOOD PRESSURE: 120 MMHG | HEART RATE: 77 BPM | WEIGHT: 307 LBS | BODY MASS INDEX: 46.53 KG/M2 | HEIGHT: 68 IN

## 2022-03-18 DIAGNOSIS — Z12.11 COLON CANCER SCREENING: ICD-10-CM

## 2022-03-18 DIAGNOSIS — I10 ESSENTIAL HYPERTENSION: ICD-10-CM

## 2022-03-18 DIAGNOSIS — Z02.89 ENCOUNTER FOR COMPLETION OF FORM WITH PATIENT: ICD-10-CM

## 2022-03-18 DIAGNOSIS — E78.5 DYSLIPIDEMIA: ICD-10-CM

## 2022-03-18 DIAGNOSIS — E66.01 MORBID OBESITY WITH BMI OF 45.0-49.9, ADULT (HCC): ICD-10-CM

## 2022-03-18 DIAGNOSIS — E11.9 TYPE 2 DIABETES MELLITUS WITHOUT COMPLICATION, WITHOUT LONG-TERM CURRENT USE OF INSULIN (HCC): ICD-10-CM

## 2022-03-18 DIAGNOSIS — R79.89 LOW TESTOSTERONE IN MALE: ICD-10-CM

## 2022-03-18 DIAGNOSIS — G47.33 OSA (OBSTRUCTIVE SLEEP APNEA): ICD-10-CM

## 2022-03-18 DIAGNOSIS — M48.061 SPINAL STENOSIS OF LUMBAR REGION AT MULTIPLE LEVELS: Chronic | ICD-10-CM

## 2022-03-18 DIAGNOSIS — Z00.00 ENCOUNTER FOR ANNUAL HEALTH EXAMINATION: Primary | ICD-10-CM

## 2022-03-18 PROCEDURE — G0439 PPPS, SUBSEQ VISIT: HCPCS | Performed by: INTERNAL MEDICINE

## 2022-03-18 PROCEDURE — 99213 OFFICE O/P EST LOW 20 MIN: CPT | Performed by: INTERNAL MEDICINE

## 2022-03-30 ENCOUNTER — LAB ENCOUNTER (OUTPATIENT)
Dept: LAB | Age: 67
End: 2022-03-30
Attending: INTERNAL MEDICINE
Payer: MEDICARE

## 2022-03-30 DIAGNOSIS — I10 ESSENTIAL HYPERTENSION: ICD-10-CM

## 2022-03-30 DIAGNOSIS — R79.89 LOW TESTOSTERONE IN MALE: ICD-10-CM

## 2022-03-30 DIAGNOSIS — Z00.00 ENCOUNTER FOR ANNUAL HEALTH EXAMINATION: ICD-10-CM

## 2022-03-30 DIAGNOSIS — E11.9 TYPE 2 DIABETES MELLITUS WITHOUT COMPLICATION, WITHOUT LONG-TERM CURRENT USE OF INSULIN (HCC): ICD-10-CM

## 2022-03-30 DIAGNOSIS — E78.5 DYSLIPIDEMIA: ICD-10-CM

## 2022-03-30 LAB
ALBUMIN SERPL-MCNC: 3.9 G/DL (ref 3.4–5)
ALBUMIN/GLOB SERPL: 1 {RATIO} (ref 1–2)
ALP LIVER SERPL-CCNC: 93 U/L
ALT SERPL-CCNC: 34 U/L
ANION GAP SERPL CALC-SCNC: 6 MMOL/L (ref 0–18)
AST SERPL-CCNC: 18 U/L (ref 15–37)
BASOPHILS # BLD AUTO: 0.07 X10(3) UL (ref 0–0.2)
BASOPHILS NFR BLD AUTO: 0.8 %
BILIRUB SERPL-MCNC: 0.8 MG/DL (ref 0.1–2)
BUN BLD-MCNC: 16 MG/DL (ref 7–18)
BUN/CREAT SERPL: 13.7 (ref 10–20)
CALCIUM BLD-MCNC: 9.1 MG/DL (ref 8.5–10.1)
CHLORIDE SERPL-SCNC: 107 MMOL/L (ref 98–112)
CHOLEST SERPL-MCNC: 169 MG/DL (ref ?–200)
CO2 SERPL-SCNC: 25 MMOL/L (ref 21–32)
CREAT BLD-MCNC: 1.17 MG/DL
CREAT UR-SCNC: 268 MG/DL
DEPRECATED RDW RBC AUTO: 44.4 FL (ref 35.1–46.3)
EOSINOPHIL # BLD AUTO: 0.14 X10(3) UL (ref 0–0.7)
EOSINOPHIL NFR BLD AUTO: 1.6 %
ERYTHROCYTE [DISTWIDTH] IN BLOOD BY AUTOMATED COUNT: 13 % (ref 11–15)
EST. AVERAGE GLUCOSE BLD GHB EST-MCNC: 128 MG/DL (ref 68–126)
FASTING PATIENT LIPID ANSWER: YES
FASTING STATUS PATIENT QL REPORTED: YES
GLOBULIN PLAS-MCNC: 4.1 G/DL (ref 2.8–4.4)
GLUCOSE BLD-MCNC: 102 MG/DL (ref 70–99)
HBA1C MFR BLD: 6.1 % (ref ?–5.7)
HCT VFR BLD AUTO: 48.3 %
HDLC SERPL-MCNC: 39 MG/DL (ref 40–59)
HGB BLD-MCNC: 16 G/DL
IMM GRANULOCYTES # BLD AUTO: 0.02 X10(3) UL (ref 0–1)
IMM GRANULOCYTES NFR BLD: 0.2 %
LDLC SERPL CALC-MCNC: 103 MG/DL (ref ?–100)
LYMPHOCYTES # BLD AUTO: 2.26 X10(3) UL (ref 1–4)
LYMPHOCYTES NFR BLD AUTO: 25.2 %
MCH RBC QN AUTO: 30.9 PG (ref 26–34)
MCHC RBC AUTO-ENTMCNC: 33.1 G/DL (ref 31–37)
MCV RBC AUTO: 93.4 FL
MICROALBUMIN UR-MCNC: 4.57 MG/DL
MICROALBUMIN/CREAT 24H UR-RTO: 17.1 UG/MG (ref ?–30)
MONOCYTES # BLD AUTO: 0.61 X10(3) UL (ref 0.1–1)
MONOCYTES NFR BLD AUTO: 6.8 %
NEUTROPHILS # BLD AUTO: 5.87 X10 (3) UL (ref 1.5–7.7)
NEUTROPHILS # BLD AUTO: 5.87 X10(3) UL (ref 1.5–7.7)
NEUTROPHILS NFR BLD AUTO: 65.4 %
NONHDLC SERPL-MCNC: 130 MG/DL (ref ?–130)
OSMOLALITY SERPL CALC.SUM OF ELEC: 287 MOSM/KG (ref 275–295)
PLATELET # BLD AUTO: 321 10(3)UL (ref 150–450)
POTASSIUM SERPL-SCNC: 4.4 MMOL/L (ref 3.5–5.1)
PROT SERPL-MCNC: 8 G/DL (ref 6.4–8.2)
PSA SERPL-MCNC: 1.53 NG/ML (ref ?–4)
RBC # BLD AUTO: 5.17 X10(6)UL
SODIUM SERPL-SCNC: 138 MMOL/L (ref 136–145)
TRIGL SERPL-MCNC: 150 MG/DL (ref 30–149)
TSI SER-ACNC: 2.06 MIU/ML (ref 0.36–3.74)
VLDLC SERPL CALC-MCNC: 25 MG/DL (ref 0–30)
WBC # BLD AUTO: 9 X10(3) UL (ref 4–11)

## 2022-03-30 PROCEDURE — 80061 LIPID PANEL: CPT

## 2022-03-30 PROCEDURE — 80053 COMPREHEN METABOLIC PANEL: CPT

## 2022-03-30 PROCEDURE — 82570 ASSAY OF URINE CREATININE: CPT

## 2022-03-30 PROCEDURE — 84153 ASSAY OF PSA TOTAL: CPT

## 2022-03-30 PROCEDURE — 84402 ASSAY OF FREE TESTOSTERONE: CPT

## 2022-03-30 PROCEDURE — 83036 HEMOGLOBIN GLYCOSYLATED A1C: CPT

## 2022-03-30 PROCEDURE — 85025 COMPLETE CBC W/AUTO DIFF WBC: CPT

## 2022-03-30 PROCEDURE — 84403 ASSAY OF TOTAL TESTOSTERONE: CPT

## 2022-03-30 PROCEDURE — 84443 ASSAY THYROID STIM HORMONE: CPT

## 2022-03-30 PROCEDURE — 36415 COLL VENOUS BLD VENIPUNCTURE: CPT

## 2022-03-30 PROCEDURE — 82043 UR ALBUMIN QUANTITATIVE: CPT

## 2022-04-06 LAB
TESTOSTERONE, FREE, S: 5.48 NG/DL
TESTOSTERONE, TOTAL, S: 166 NG/DL

## 2022-04-12 NOTE — TELEPHONE ENCOUNTER
Refill passed per [employer], [facility] protocol.     Requested Prescriptions   Pending Prescriptions Disp Refills    Evaristo Candelaria In [address] [place] [address] Name: ONE TOUCH VERIO TEST STRIP] 200 strip 3     Sig: TEST TWICE DAILY AS NEEDED        Diabetic Supplies Protocol Passed - 4/12/2022  9:06 AM        Passed - Appointment in past 12 or next 3 months              Recent Outpatient Visits              3 weeks ago Encounter for annual health examination    Crystal Barrett MD    Office Visit    1 month ago Type 2 diabetes mellitus without complication, without long-term current use of insulin [facility])    Luciano Loaiza MD    Office Visit    3 months ago Type 2 diabetes mellitus without complication, without long-term current use of insulin [facility])    Luciano Loaiza MD    Office Visit    4 months ago Type 2 diabetes mellitus without complication, without long-term current use of insulin [facility])    [facility] for [address] Visit    6 months ago Pulled muscle    Crystal Barrett MD    Office Visit            Future Appointments         Provider Department Appt Notes    In 2 months MD Omari Orellana [place] MEDICARE  NON SX F/U    In 5 months Jose Stringer MD [facility] [place], [facility], [address] [facility] Ramon Rosales 6 month f/u

## 2022-04-17 NOTE — TELEPHONE ENCOUNTER
Refill passed per [facility] protocol.      Requested Prescriptions   Pending Prescriptions Disp Refills    METFORMIN 500 MG Oral Tab [Pharmacy Med Name: METFORMIN  MG TABLET] 180 tablet 1     Sig: TAKE 1 TABLET BY MOUTH TWICE A DAY WITH MEALS        Diabetes Medication Protocol Passed - 4/17/2022 12:13 AM        Passed - Last A1C < 7.5 and within past 6 months     Lab Results   Component Value Date    A1C 6.1 (H) 03/30/2022               Passed - Appointment in past 6 or next 3 months        Passed - GFR Non- > 50     Lab Results   Component Value Date    GFRNAA 65 03/30/2022                 Passed - GFR in the past 12 months                Recent Outpatient Visits              1 month ago Encounter for annual health examination    Jerilyn Castro MD    Office Visit    1 month ago Type 2 diabetes mellitus without complication, without long-term current use of insulin [facility])    Ayaz Stockton MD    Office Visit    4 months ago Type 2 diabetes mellitus without complication, without long-term current use of insulin [facility])    Ayaz Stockton MD    Office Visit    4 months ago Type 2 diabetes mellitus without complication, without long-term current use of insulin [facility])    [facility] [place] [facility] for [address] Visit    6 months ago Pulled muscle    Jerilyn Castro MD    Office Visit             Future Appointments         Provider Department Appt Notes    In 2 months Ana Sumner MD [address] [place] [address] MEDICARE  NON SX F/U    In 5 months Sonali Leiva MD [place] [employer] [place], [facility], [address] [place] [address] 6 month f/u

## 2022-05-04 ENCOUNTER — NURSE TRIAGE (OUTPATIENT)
Dept: INTERNAL MEDICINE CLINIC | Facility: CLINIC | Age: 67
End: 2022-05-04

## 2022-05-04 LAB — AMB EXT COVID-19 RESULT: DETECTED

## 2022-05-17 RX ORDER — TOPIRAMATE 25 MG/1
TABLET ORAL
Qty: 90 TABLET | Refills: 0 | Status: SHIPPED | OUTPATIENT
Start: 2022-05-17

## 2022-05-17 NOTE — TELEPHONE ENCOUNTER
Refill passed per [place] [facility] protocol.     Requested Prescriptions   Pending Prescriptions Disp Refills    METOPROLOL SUCCINATE ER 25 MG Oral Tablet 24 Hr [Pharmacy Med Name: METOPROLOL SUCC ER 25 MG TAB] 90 tablet 1     Sig: TAKE 1 TABLET BY MOUTH EVERY DAY        Hypertensive Medications Protocol Passed - 5/17/2022  8:19 AM        Passed - CMP or BMP in past 12 months        Passed - Appointment in past 6 or next 3 months        Passed - GFR Non- > 50     Lab Results   Component Value Date    GFRNAA 65 03/30/2022                    JARDIANCE 25 MG Oral Tab [Pharmacy Med Name: Jose Roberto Elder 25 MG TABLET] 90 tablet 1     Sig: TAKE 1 TABLET BY MOUTH EVERY DAY        Diabetes Medication Protocol Passed - 5/17/2022  8:19 AM        Passed - Last A1C < 7.5 and within past 6 months     Lab Results   Component Value Date    A1C 6.1 (H) 03/30/2022               Passed - Appointment in past 6 or next 3 months        Passed - GFR Non- > 50     Lab Results   Component Value Date    [facility] 65 03/30/2022                 Passed - GFR in the past 12 months              Recent Outpatient Visits              2 months ago Encounter for annual health examination    [facility], [facility]    Office Visit    2 months ago Type 2 diabetes mellitus without complication, without long-term current use of insulin [facility])    [address] [place] [address],[address], Bossman Arzola MD    Office Visit    5 months ago Type 2 diabetes mellitus without complication, without long-term current use of insulin [facility])    Hoa Smith MD    Office Visit    5 months ago Type 2 diabetes mellitus without complication, without long-term current use of insulin [facility])    [facility] for [address] Visit    7 months ago Pulled muscle    [facility], [facility], [address] MD    Office Visit            Future Appointments         Provider Department Appt Notes    In 1 month Zoë [place] MD [address] [facility],[address], [place] MEDICARE  NON SX F/U    In 4 months Aleena Ratliff MD [address] 6 month f/u

## 2022-06-15 NOTE — PATIENT INSTRUCTIONS
Jose Lennon's SCREENING SCHEDULE   Tests on this list are recommended by your physician but may not be covered, or covered at this frequency, by your insurer. Please check with your insurance carrier before scheduling to verify coverage.     CO You might feel 'gassy' or "crampy'' for a few hours. This is because air was put into the stomach during the procedure. However, if you have continued abdominal (stomach) pain or swelling, contact your doctor right away. Screen   Covered every 10 years- more often if abnormal Colonoscopy due on 11/29/2022 Update Health Maintenance if applicable    Flex Sigmoidoscopy Screen  Covered every 5 years No results found for this or any previous visit. No flowsheet data found. previous visit. This may be covered with your prescription benefits, but Medicare does not cover unless Medically needed    Zoster (Not covered by Medicare Part B) No orders found for this or any previous visit.  This may be covered with your pharmacy  pres

## 2022-06-23 ENCOUNTER — OFFICE VISIT (OUTPATIENT)
Dept: SURGERY | Facility: CLINIC | Age: 67
End: 2022-06-23
Payer: MEDICARE

## 2022-06-23 VITALS
HEART RATE: 75 BPM | BODY MASS INDEX: 45.8 KG/M2 | HEIGHT: 68 IN | DIASTOLIC BLOOD PRESSURE: 84 MMHG | SYSTOLIC BLOOD PRESSURE: 128 MMHG | WEIGHT: 302.19 LBS | OXYGEN SATURATION: 97 %

## 2022-06-23 DIAGNOSIS — Z51.81 ENCOUNTER FOR THERAPEUTIC DRUG MONITORING: ICD-10-CM

## 2022-06-23 DIAGNOSIS — R63.2 BINGE EATING: ICD-10-CM

## 2022-06-23 DIAGNOSIS — E11.9 TYPE 2 DIABETES MELLITUS WITHOUT COMPLICATION, WITHOUT LONG-TERM CURRENT USE OF INSULIN (HCC): Primary | ICD-10-CM

## 2022-06-23 DIAGNOSIS — E66.01 MORBID OBESITY WITH BMI OF 45.0-49.9, ADULT (HCC): ICD-10-CM

## 2022-06-23 DIAGNOSIS — I10 ESSENTIAL HYPERTENSION: ICD-10-CM

## 2022-06-23 PROCEDURE — 99214 OFFICE O/P EST MOD 30 MIN: CPT | Performed by: INTERNAL MEDICINE

## 2022-06-23 RX ORDER — SEMAGLUTIDE 1.34 MG/ML
0.5 INJECTION, SOLUTION SUBCUTANEOUS
Qty: 4.5 ML | Refills: 2 | Status: SHIPPED | OUTPATIENT
Start: 2022-06-23 | End: 2022-09-21

## 2022-07-10 RX ORDER — PITAVASTATIN CALCIUM 4.18 MG/1
TABLET, FILM COATED ORAL
Qty: 90 TABLET | Refills: 3 | Status: SHIPPED | OUTPATIENT
Start: 2022-07-10

## 2022-07-11 RX ORDER — ERGOCALCIFEROL 1.25 MG/1
CAPSULE ORAL
Qty: 12 CAPSULE | Refills: 2 | Status: SHIPPED | OUTPATIENT
Start: 2022-07-11

## 2022-07-11 RX ORDER — TOPIRAMATE 50 MG/1
50 TABLET, FILM COATED ORAL 2 TIMES DAILY
Qty: 180 TABLET | Refills: 1 | Status: SHIPPED | OUTPATIENT
Start: 2022-07-11 | End: 2022-10-09

## 2022-09-09 ENCOUNTER — TELEMEDICINE (OUTPATIENT)
Dept: INTERNAL MEDICINE CLINIC | Facility: CLINIC | Age: 67
End: 2022-09-09

## 2022-09-09 ENCOUNTER — MED REC SCAN ONLY (OUTPATIENT)
Dept: INTERNAL MEDICINE CLINIC | Facility: CLINIC | Age: 67
End: 2022-09-09

## 2022-09-09 DIAGNOSIS — I10 ESSENTIAL HYPERTENSION: ICD-10-CM

## 2022-09-09 DIAGNOSIS — E11.9 TYPE 2 DIABETES MELLITUS WITHOUT COMPLICATION, WITHOUT LONG-TERM CURRENT USE OF INSULIN (HCC): ICD-10-CM

## 2022-09-09 DIAGNOSIS — R09.82 POSTNASAL DRIP: Primary | ICD-10-CM

## 2022-09-09 DIAGNOSIS — E66.01 MORBID OBESITY WITH BMI OF 45.0-49.9, ADULT (HCC): ICD-10-CM

## 2022-09-09 DIAGNOSIS — Z02.89 ENCOUNTER FOR COMPLETION OF FORM WITH PATIENT: ICD-10-CM

## 2022-09-09 PROCEDURE — 99213 OFFICE O/P EST LOW 20 MIN: CPT | Performed by: INTERNAL MEDICINE

## 2022-09-29 ENCOUNTER — TELEPHONE (OUTPATIENT)
Dept: GASTROENTEROLOGY | Facility: CLINIC | Age: 67
End: 2022-09-29

## 2022-09-29 NOTE — TELEPHONE ENCOUNTER
----- Message from Marco Benz, [address] [place] [address] [place] sent at 9/29/2022  9:10 AM CDT -----  Regarding: Recall Colon  Edis Saldaña CMA  [facility] Clinical Staff  Recall colon in 5 years per GS.  Colon done 11-29-17 Presenting Problems





- Arrival Data


Date of Arrival on Unit: 18


Time of Arrival on Unit: 16:30


Mode of Transport: Wheelchair





- Complaint


OB-Reason for Admission/Chief Complaint: Possible Onset of Labor





Prenatal Medical History





- Pregnancy Information


: 2


Para: 1


Term: 1


: 0


Abortions: Spontaneous or Elective: 0


Number of Living Children: 1





- Gestational Age


Gestational Age by DESTINEY (wks/days): 36 Weeks and 1 Days





- Prenatal History


Pregnancy Complications: Prior 





Review of Systems





- Review of Systems


Constitutional: No problems


Breast: No problems


ENT: No problems


Cardiovascular: No problems


Respiratory: No problems


Gastrointestinal: No problems


Genitourinary: No problems


Musculoskeletal: No problems


Neurological: No problems


Skin: No problems





Vital Signs





- Temperature


Temperature: 97.4 F


Temperature Source: Temporal Artery Scan





- Pulse


  ** Right Sitting Brachial


Pulse Rate: 99


Pulse Assessment Method: Pulse Oximetry





- Respirations


Respiratory Rate: 16


Oxygen Delivery Method: Room Air


O2 Sat by Pulse Oximetry: 98





- Blood Pressure


  ** Right Arm Sitting


Blood Pressure: 117/71


Blood Pressure Mean: 86


Blood Pressure Source: Automatic Cuff





Medical Screen Scoring (Pre)





- Cervical Exam


Dilation: 0 cm = 0


Membranes: Intact





- Uterine Contractions


Frequency: > 5 minutes apart = 1


Duration: N/A


Intensity: N/A





- Maternal Vital Signs


Maternal Temperature: N/A


Maternal Blood Pressure: N/A


Signs of Preeclampsia: N/A


Maternal Respirations: N/A





- Pain Assessment


Pain Location and Character: Lower, Abdomen


Pain Scale Used: Numeric (1 - 10)


Pain Intensity: 9


Pain Management Goal: 0


Pain Description: Cramping


Pain Radiation Location: 0


Pain Frequency: Occasional


Pain Duration: 2


Pain Duration Units: Days


Pain Behavior: Facial Grimacing


Effects of Pain: 0


Pain Aggravating Factors: None





- Maternal Trauma


Maternal Trauma: N/A





- Fetal Assessment


Baseline FHR: 135


Fetal Heart Rate - NICHD Category: Category I (Normal) = 0


Fetal Position: N/A


Fetal Station: N/A





- Total Score


Total Score (Pre): 1





- Level of Risk


Level of Risk: Low (0-5)





Physician Notification (Pre)





- Physician Notified


Physician Notified Date: 18


Physician Notified Time: 17:24


Physician/Practitioner Notifed:: Dr Fuentes


Spoke With: Dr Fuentes


New Order Received: Yes





- Notification Comment


Comment: orders for discharge at this time, pelvic rest until next OB appt., 

increase oral hydration.





Disposition





- Disposition


OB Disposition: Discharge to home


Discharge Date: 18


Discharge Time: 17:33


I agree with the RN Medical Screening Exam: Yes


Risk & Benefit of care provided described in d/c instruction: Yes


Diagnosis: PREGNANCY RELATED CONDITIONS, UNSPECIFIED, THIRD TRIMESTER

## 2022-10-07 ENCOUNTER — TELEPHONE (OUTPATIENT)
Dept: GASTROENTEROLOGY | Facility: CLINIC | Age: 67
End: 2022-10-07

## 2022-10-07 DIAGNOSIS — Z86.010 HX OF COLONIC POLYPS: Primary | ICD-10-CM

## 2022-10-07 RX ORDER — TOPIRAMATE 50 MG/1
50 TABLET, FILM COATED ORAL DAILY
COMMUNITY
End: 2022-10-24

## 2022-10-07 NOTE — TELEPHONE ENCOUNTER
Last Procedure, Date, MD:  11/29/2017/Colonoscopy, Dr Gayla Reno  Last Diagnosis: tubular adenoma   Recalled for (mth/yrs): 5 years  Sedation used previously:  IV  Last Prep Used (if known):  Colyte  Quality of prep (if known): good  Anticoagulants: no,  Diabetic Meds: Ozempic, jardiance, metformin (prescribed by Dr Monnie Goodpasture)  BP meds(Ace inhibitors/ARB's): no  Weight loss meds (phentermine/vyvanse):no  Iron supplement (RX/OTC):no  Height & Weight/BMI: 5'8\"/280/41.3  Hx of Cardiac/CVA issues/(MI/Stroke):no  Devices Pacemaker/Defibrillator/Stents: no  Resp. Issues/Oxygen Use/BRYCE/COPD: BRYCE CPAP  Issues w/Anesthesia: no    Symptoms (Y/N): no  Symptoms Details:     Special comments/notes: medications, allergies and pharmacy verified    Please advise on orders and prep, thank you.

## 2022-10-07 NOTE — TELEPHONE ENCOUNTER
If no symptoms may schedule for a history of polyps following a split dose Colyte preparation and either monitored anesthesia care or IV sedation per scheduling. Hold diabetic medications the evening before and the morning of the procedure.

## 2022-10-24 ENCOUNTER — OFFICE VISIT (OUTPATIENT)
Dept: SURGERY | Facility: CLINIC | Age: 67
End: 2022-10-24
Payer: MEDICARE

## 2022-10-24 VITALS
DIASTOLIC BLOOD PRESSURE: 79 MMHG | SYSTOLIC BLOOD PRESSURE: 131 MMHG | OXYGEN SATURATION: 93 % | WEIGHT: 283.81 LBS | HEIGHT: 68 IN | HEART RATE: 77 BPM | BODY MASS INDEX: 43.01 KG/M2

## 2022-10-24 DIAGNOSIS — R63.2 BINGE EATING: ICD-10-CM

## 2022-10-24 DIAGNOSIS — E11.9 TYPE 2 DIABETES MELLITUS WITHOUT COMPLICATION, WITHOUT LONG-TERM CURRENT USE OF INSULIN (HCC): Primary | ICD-10-CM

## 2022-10-24 DIAGNOSIS — E66.01 MORBID OBESITY WITH BMI OF 40.0-44.9, ADULT (HCC): ICD-10-CM

## 2022-10-24 DIAGNOSIS — Z51.81 ENCOUNTER FOR THERAPEUTIC DRUG MONITORING: ICD-10-CM

## 2022-10-24 DIAGNOSIS — I10 ESSENTIAL HYPERTENSION: ICD-10-CM

## 2022-10-24 PROCEDURE — 99214 OFFICE O/P EST MOD 30 MIN: CPT | Performed by: INTERNAL MEDICINE

## 2022-10-24 RX ORDER — TOPIRAMATE 50 MG/1
50 TABLET, FILM COATED ORAL 2 TIMES DAILY
Qty: 180 TABLET | Refills: 1 | Status: SHIPPED | OUTPATIENT
Start: 2022-10-24 | End: 2023-01-22

## 2022-10-28 NOTE — TELEPHONE ENCOUNTER
Assumed patient care at 1900. Patient A&O x 4 with no complaints of pain or discomfort at this time. VSS. 5L high flow oxygen applied with O2 levels between 90-95%. Dialysis completed overnight with 3L removed.  Patient increasingly anxious overnight; PRN a Scheduled for:  Colonoscopy 80092  Provider Name:  Dr Zelalem Rangel  Date:  02/28/2023  Location:  [facility]  Sedation:  MAC  Time:  8:15am ( pt is aware arrival time is at 7:15am)  Prep:  Trilyte   Meds/Allergies Reconciled?:  Yes    Diagnosis with codes:  Hx of Colon Polyps Z86.010  Was patient informed to call insurance with codes (Y/N):  Yes   Referral sent?:  Referral was sent at the time of electronic surgical scheduling. [address] [place] [address] or [facility] notified?:  I sent an electronic request to Endo Scheduling and received a confirmation today. Medication Orders: HOLD Jardiance and metformin prior to the procedure Pt is aware to NOT take iron pills, herbal meds and diet supplements for 7 days before exam. Also to NOT take any form of alcohol, recreational drugs and any forms of ED meds 24 hours before exam.   Misc Orders:  Patient was informed that they will need a COVID 19 test prior to their procedure. Patient verbally understood & will await a phone call from [facility] to schedule. Further instructions given by staff:I provide prep instructions to patient via mValenthart and reviewed date, time and location, he  verbalized that she understood and is aware to call if he  has any questions.

## 2022-12-30 RX ORDER — METOPROLOL SUCCINATE 25 MG/1
TABLET, EXTENDED RELEASE ORAL
Qty: 90 TABLET | Refills: 3 | Status: SHIPPED | OUTPATIENT
Start: 2022-12-30

## 2023-01-30 ENCOUNTER — OFFICE VISIT (OUTPATIENT)
Dept: SURGERY | Facility: CLINIC | Age: 68
End: 2023-01-30
Payer: MEDICARE

## 2023-01-30 VITALS
HEART RATE: 71 BPM | OXYGEN SATURATION: 97 % | SYSTOLIC BLOOD PRESSURE: 130 MMHG | BODY MASS INDEX: 42.72 KG/M2 | WEIGHT: 281.88 LBS | HEIGHT: 68 IN | DIASTOLIC BLOOD PRESSURE: 79 MMHG

## 2023-01-30 DIAGNOSIS — E66.01 MORBID OBESITY WITH BMI OF 40.0-44.9, ADULT (HCC): ICD-10-CM

## 2023-01-30 DIAGNOSIS — Z51.81 ENCOUNTER FOR THERAPEUTIC DRUG MONITORING: ICD-10-CM

## 2023-01-30 DIAGNOSIS — E11.9 TYPE 2 DIABETES MELLITUS WITHOUT COMPLICATION, WITHOUT LONG-TERM CURRENT USE OF INSULIN (HCC): Primary | ICD-10-CM

## 2023-01-30 DIAGNOSIS — R63.2 BINGE EATING: ICD-10-CM

## 2023-01-30 DIAGNOSIS — I10 ESSENTIAL HYPERTENSION: ICD-10-CM

## 2023-01-30 PROCEDURE — 99214 OFFICE O/P EST MOD 30 MIN: CPT | Performed by: INTERNAL MEDICINE

## 2023-01-30 RX ORDER — SEMAGLUTIDE 1.34 MG/ML
1 INJECTION, SOLUTION SUBCUTANEOUS
Qty: 9 ML | Refills: 1 | Status: SHIPPED | OUTPATIENT
Start: 2023-01-30 | End: 2023-04-30

## 2023-01-30 RX ORDER — TOPIRAMATE 50 MG/1
50 TABLET, FILM COATED ORAL 2 TIMES DAILY
Qty: 180 TABLET | Refills: 1 | Status: SHIPPED | OUTPATIENT
Start: 2023-01-30 | End: 2023-04-30

## 2023-02-18 RX ORDER — POLYETHYLENE GLYCOL 3350, SODIUM CHLORIDE, SODIUM BICARBONATE, POTASSIUM CHLORIDE 420; 11.2; 5.72; 1.48 G/4L; G/4L; G/4L; G/4L
4 POWDER, FOR SOLUTION ORAL ONCE
Qty: 4000 ML | Refills: 0 | Status: SHIPPED | OUTPATIENT
Start: 2023-02-18 | End: 2023-02-18

## 2023-02-28 ENCOUNTER — ANESTHESIA (OUTPATIENT)
Dept: ENDOSCOPY | Facility: HOSPITAL | Age: 68
End: 2023-02-28
Payer: MEDICARE

## 2023-02-28 ENCOUNTER — TELEPHONE (OUTPATIENT)
Facility: CLINIC | Age: 68
End: 2023-02-28

## 2023-02-28 ENCOUNTER — ANESTHESIA EVENT (OUTPATIENT)
Dept: ENDOSCOPY | Facility: HOSPITAL | Age: 68
End: 2023-02-28
Payer: MEDICARE

## 2023-02-28 ENCOUNTER — HOSPITAL ENCOUNTER (OUTPATIENT)
Facility: HOSPITAL | Age: 68
Setting detail: HOSPITAL OUTPATIENT SURGERY
Discharge: HOME | End: 2023-02-28
Attending: INTERNAL MEDICINE | Admitting: INTERNAL MEDICINE
Payer: MEDICARE

## 2023-02-28 VITALS
HEART RATE: 80 BPM | WEIGHT: 280 LBS | HEIGHT: 70 IN | TEMPERATURE: 97 F | DIASTOLIC BLOOD PRESSURE: 93 MMHG | SYSTOLIC BLOOD PRESSURE: 157 MMHG | OXYGEN SATURATION: 96 % | BODY MASS INDEX: 40.09 KG/M2 | RESPIRATION RATE: 17 BRPM

## 2023-02-28 DIAGNOSIS — Z86.010 HX OF COLONIC POLYPS: ICD-10-CM

## 2023-02-28 LAB — GLUCOSE BLDC GLUCOMTR-MCNC: 93 MG/DL (ref 70–99)

## 2023-02-28 PROCEDURE — 0DJD8ZZ INSPECTION OF LOWER INTESTINAL TRACT, VIA NATURAL OR ARTIFICIAL OPENING ENDOSCOPIC: ICD-10-PCS | Performed by: INTERNAL MEDICINE

## 2023-02-28 PROCEDURE — G0105 COLORECTAL SCRN; HI RISK IND: HCPCS | Performed by: INTERNAL MEDICINE

## 2023-02-28 RX ORDER — LIDOCAINE HYDROCHLORIDE 10 MG/ML
INJECTION, SOLUTION EPIDURAL; INFILTRATION; INTRACAUDAL; PERINEURAL AS NEEDED
Status: DISCONTINUED | OUTPATIENT
Start: 2023-02-28 | End: 2023-02-28 | Stop reason: SURG

## 2023-02-28 RX ORDER — SODIUM CHLORIDE, SODIUM LACTATE, POTASSIUM CHLORIDE, CALCIUM CHLORIDE 600; 310; 30; 20 MG/100ML; MG/100ML; MG/100ML; MG/100ML
INJECTION, SOLUTION INTRAVENOUS CONTINUOUS
Status: DISCONTINUED | OUTPATIENT
Start: 2023-02-28 | End: 2023-02-28

## 2023-02-28 RX ADMIN — SODIUM CHLORIDE, SODIUM LACTATE, POTASSIUM CHLORIDE, CALCIUM CHLORIDE: 600; 310; 30; 20 INJECTION, SOLUTION INTRAVENOUS at 08:14:00

## 2023-02-28 RX ADMIN — LIDOCAINE HYDROCHLORIDE 50 MG: 10 INJECTION, SOLUTION EPIDURAL; INFILTRATION; INTRACAUDAL; PERINEURAL at 08:14:00

## 2023-02-28 NOTE — OPERATIVE REPORT
[facility] Endoscopy Report      Date of Procedure:  02/28/23      Preoperative Diagnosis:  Personal history of adenomatous colon polyp      Postoperative Diagnosis:  Sigmoid colon diverticulosis      Procedure:    Colonoscopy       Surgeon:  John Pressley M.D. Anesthesia:  Monitored anesthesia care  Cecal withdrawal time: 19 minutes  EBL: None      Brief History: This is a 80year old male who presents for surveillance colonoscopy in the setting of a history of a solitary subcentimeter tubular adenoma removed from the colon a little over 5 years prior. The patient has been asymptomatic from a lower gastrointestinal tract standpoint. Technique:  After informed consent, the patient was placed in the left lateral recumbent position. Digital rectal examination revealed no palpable intraluminal abnormalities. An Olympus variable stiffness 190 series HD colonoscope was inserted into the rectum and advanced under direct vision by following the lumen to the terminal ileum. The colon was examined upon withdrawal in the left lateral recumbent position. Findings:  The preparation of the colon was reasonably good, however, some irrigation was required. The terminal ileum was examined for several cm and visually normal.  The ileocecal valve was well preserved. The visualized colonic mucosa from the cecum to the anal verge was normal with an intact vascular pattern. There were multiple diverticula seen in the sigmoid colon without current signs of complication. There were no colonic polyps, mass lesions, vascular anomalies or signs of inflammation seen. Retroflexion in the rectum revealed no abnormalities. The procedure was well tolerated without immediate complication. Impression:  1. Uncomplicated sigmoid colon diverticulosis  2. Otherwise normal colonoscopy to the terminal ileum    Recommendations:  1. High-fiber diet.   2.  Screening colonoscopy in 10 years unless any new symptoms or signs are noted.         Lionel Falk MD  2/28/2023

## 2023-02-28 NOTE — ANESTHESIA POSTPROCEDURE EVALUATION
Patient: Chaz Mcarthur    Procedure Summary     Date: 02/28/23 Room / Location: [address] [place] [address] ENDOSCOPY 04 / [address] [place] [address] ENDOSCOPY    Anesthesia Start: 0813 Anesthesia Stop:     Procedure: COLONOSCOPY Diagnosis:       Hx of colonic polyps      (Diverticulosis)    Surgeons: Amanda Brown MD Anesthesiologist: Trenton Eaton CRNA    Anesthesia Type: general ASA Status: 3          Anesthesia Type: general    Vitals Value Taken Time   /92 02/28/23 0845   Temp 97.6 02/28/23 0845   Pulse 96 02/28/23 0845   Resp 14 02/28/23 0845   SpO2 97 02/28/23 0845       [facility] AN Post Evaluation:   Patient Evaluated in PACU  Patient Participation: complete - patient participated  Level of Consciousness: awake  Pain Score: 0  Pain Management: adequate  Airway Patency:patent  Dental exam unchanged from preop  Yes    Cardiovascular Status: acceptable  Respiratory Status: acceptable  Postoperative Hydration acceptable      [address] Ajit Dawn CRNA  2/28/2023 8:45 AM

## 2023-02-28 NOTE — DISCHARGE INSTRUCTIONS

## 2023-02-28 NOTE — TELEPHONE ENCOUNTER
Health maintenance updated. 10  10 year colonoscopy recall placed in patient outreach. Next due on 02/28/2033 per Dr. Ezio Byrd.

## 2023-03-07 ENCOUNTER — OFFICE VISIT (OUTPATIENT)
Dept: INTERNAL MEDICINE CLINIC | Facility: CLINIC | Age: 68
End: 2023-03-07

## 2023-03-07 VITALS
SYSTOLIC BLOOD PRESSURE: 110 MMHG | BODY MASS INDEX: 40.23 KG/M2 | HEART RATE: 90 BPM | OXYGEN SATURATION: 95 % | WEIGHT: 281 LBS | DIASTOLIC BLOOD PRESSURE: 66 MMHG | HEIGHT: 70 IN

## 2023-03-07 DIAGNOSIS — I10 ESSENTIAL HYPERTENSION: ICD-10-CM

## 2023-03-07 DIAGNOSIS — Z02.89 ENCOUNTER FOR COMPLETION OF FORM WITH PATIENT: ICD-10-CM

## 2023-03-07 DIAGNOSIS — G47.33 OSA (OBSTRUCTIVE SLEEP APNEA): ICD-10-CM

## 2023-03-07 DIAGNOSIS — R79.89 LOW TESTOSTERONE IN MALE: ICD-10-CM

## 2023-03-07 DIAGNOSIS — E11.9 TYPE 2 DIABETES MELLITUS WITHOUT COMPLICATION, WITHOUT LONG-TERM CURRENT USE OF INSULIN (HCC): ICD-10-CM

## 2023-03-07 DIAGNOSIS — M17.0 PRIMARY OSTEOARTHRITIS OF BOTH KNEES: ICD-10-CM

## 2023-03-07 DIAGNOSIS — E78.5 DYSLIPIDEMIA: ICD-10-CM

## 2023-03-07 DIAGNOSIS — Z00.00 ENCOUNTER FOR ANNUAL HEALTH EXAMINATION: Primary | ICD-10-CM

## 2023-03-07 DIAGNOSIS — E66.01 MORBID OBESITY WITH BMI OF 40.0-44.9, ADULT (HCC): ICD-10-CM

## 2023-03-07 DIAGNOSIS — M48.061 SPINAL STENOSIS OF LUMBAR REGION AT MULTIPLE LEVELS: Chronic | ICD-10-CM

## 2023-03-07 DIAGNOSIS — Z12.11 COLON CANCER SCREENING: ICD-10-CM

## 2023-03-07 PROCEDURE — 1126F AMNT PAIN NOTED NONE PRSNT: CPT | Performed by: INTERNAL MEDICINE

## 2023-03-07 PROCEDURE — 99213 OFFICE O/P EST LOW 20 MIN: CPT | Performed by: INTERNAL MEDICINE

## 2023-03-07 PROCEDURE — G0439 PPPS, SUBSEQ VISIT: HCPCS | Performed by: INTERNAL MEDICINE

## 2023-03-07 RX ORDER — ERGOCALCIFEROL 1.25 MG/1
50000 CAPSULE ORAL WEEKLY
Qty: 12 CAPSULE | Refills: 2 | Status: SHIPPED | OUTPATIENT
Start: 2023-03-07

## 2023-03-10 ENCOUNTER — HOSPITAL ENCOUNTER (EMERGENCY)
Facility: HOSPITAL | Age: 68
Discharge: HOME | End: 2023-03-10
Attending: EMERGENCY MEDICINE
Payer: MEDICARE

## 2023-03-10 ENCOUNTER — TELEPHONE (OUTPATIENT)
Dept: INTERNAL MEDICINE CLINIC | Facility: CLINIC | Age: 68
End: 2023-03-10

## 2023-03-10 ENCOUNTER — APPOINTMENT (OUTPATIENT)
Dept: CT IMAGING | Facility: HOSPITAL | Age: 68
End: 2023-03-10
Attending: EMERGENCY MEDICINE
Payer: MEDICARE

## 2023-03-10 VITALS
RESPIRATION RATE: 18 BRPM | DIASTOLIC BLOOD PRESSURE: 90 MMHG | TEMPERATURE: 98 F | BODY MASS INDEX: 40.09 KG/M2 | OXYGEN SATURATION: 97 % | HEART RATE: 83 BPM | HEIGHT: 70 IN | SYSTOLIC BLOOD PRESSURE: 148 MMHG | WEIGHT: 280 LBS

## 2023-03-10 DIAGNOSIS — N20.0 KIDNEY STONE: Primary | ICD-10-CM

## 2023-03-10 LAB
ALBUMIN SERPL-MCNC: 4 G/DL (ref 3.4–5)
ALBUMIN/GLOB SERPL: 0.8 {RATIO} (ref 1–2)
ALP LIVER SERPL-CCNC: 98 U/L
ALT SERPL-CCNC: 33 U/L
ANION GAP SERPL CALC-SCNC: 7 MMOL/L (ref 0–18)
AST SERPL-CCNC: 16 U/L (ref 15–37)
BASOPHILS # BLD AUTO: 0.07 X10(3) UL (ref 0–0.2)
BASOPHILS NFR BLD AUTO: 0.6 %
BILIRUB SERPL-MCNC: 0.6 MG/DL (ref 0.1–2)
BILIRUB UR QL: NEGATIVE
BUN BLD-MCNC: 20 MG/DL (ref 7–18)
BUN/CREAT SERPL: 13.7 (ref 10–20)
CALCIUM BLD-MCNC: 9.2 MG/DL (ref 8.5–10.1)
CHLORIDE SERPL-SCNC: 110 MMOL/L (ref 98–112)
CLARITY UR: CLEAR
CO2 SERPL-SCNC: 23 MMOL/L (ref 21–32)
CREAT BLD-MCNC: 1.46 MG/DL
DEPRECATED RDW RBC AUTO: 40.9 FL (ref 35.1–46.3)
EOSINOPHIL # BLD AUTO: 0.12 X10(3) UL (ref 0–0.7)
EOSINOPHIL NFR BLD AUTO: 1 %
ERYTHROCYTE [DISTWIDTH] IN BLOOD BY AUTOMATED COUNT: 12.5 % (ref 11–15)
GFR SERPLBLD BASED ON 1.73 SQ M-ARVRAT: 52 ML/MIN/1.73M2 (ref 60–?)
GLOBULIN PLAS-MCNC: 4.8 G/DL (ref 2.8–4.4)
GLUCOSE BLD-MCNC: 152 MG/DL (ref 70–99)
GLUCOSE UR-MCNC: >1000 MG/DL
HCT VFR BLD AUTO: 47.5 %
HGB BLD-MCNC: 16.8 G/DL
IMM GRANULOCYTES # BLD AUTO: 0.05 X10(3) UL (ref 0–1)
IMM GRANULOCYTES NFR BLD: 0.4 %
KETONES UR-MCNC: NEGATIVE MG/DL
LEUKOCYTE ESTERASE UR QL STRIP.AUTO: NEGATIVE
LIPASE SERPL-CCNC: 151 U/L (ref 73–393)
LIPASE SERPL-CCNC: 37 U/L (ref 13–75)
LYMPHOCYTES # BLD AUTO: 1.82 X10(3) UL (ref 1–4)
LYMPHOCYTES NFR BLD AUTO: 14.5 %
MCH RBC QN AUTO: 31.3 PG (ref 26–34)
MCHC RBC AUTO-ENTMCNC: 35.4 G/DL (ref 31–37)
MCV RBC AUTO: 88.6 FL
MONOCYTES # BLD AUTO: 0.91 X10(3) UL (ref 0.1–1)
MONOCYTES NFR BLD AUTO: 7.3 %
NEUTROPHILS # BLD AUTO: 9.54 X10 (3) UL (ref 1.5–7.7)
NEUTROPHILS # BLD AUTO: 9.54 X10(3) UL (ref 1.5–7.7)
NEUTROPHILS NFR BLD AUTO: 76.2 %
NITRITE UR QL STRIP.AUTO: NEGATIVE
OSMOLALITY SERPL CALC.SUM OF ELEC: 296 MOSM/KG (ref 275–295)
PH UR: 5 [PH] (ref 5–8)
PLATELET # BLD AUTO: 267 10(3)UL (ref 150–450)
POTASSIUM SERPL-SCNC: 3.8 MMOL/L (ref 3.5–5.1)
PROT SERPL-MCNC: 8.8 G/DL (ref 6.4–8.2)
PROT UR-MCNC: NEGATIVE MG/DL
RBC # BLD AUTO: 5.36 X10(6)UL
RBC #/AREA URNS AUTO: >10 /HPF
SODIUM SERPL-SCNC: 140 MMOL/L (ref 136–145)
SP GR UR STRIP: 1.03 (ref 1–1.03)
UROBILINOGEN UR STRIP-ACNC: NORMAL
WBC # BLD AUTO: 12.5 X10(3) UL (ref 4–11)

## 2023-03-10 PROCEDURE — 99285 EMERGENCY DEPT VISIT HI MDM: CPT

## 2023-03-10 PROCEDURE — 96374 THER/PROPH/DIAG INJ IV PUSH: CPT

## 2023-03-10 PROCEDURE — 87086 URINE CULTURE/COLONY COUNT: CPT | Performed by: EMERGENCY MEDICINE

## 2023-03-10 PROCEDURE — 96361 HYDRATE IV INFUSION ADD-ON: CPT

## 2023-03-10 PROCEDURE — 99284 EMERGENCY DEPT VISIT MOD MDM: CPT

## 2023-03-10 PROCEDURE — 83690 ASSAY OF LIPASE: CPT | Performed by: EMERGENCY MEDICINE

## 2023-03-10 PROCEDURE — 80053 COMPREHEN METABOLIC PANEL: CPT | Performed by: EMERGENCY MEDICINE

## 2023-03-10 PROCEDURE — 96375 TX/PRO/DX INJ NEW DRUG ADDON: CPT

## 2023-03-10 PROCEDURE — 81001 URINALYSIS AUTO W/SCOPE: CPT | Performed by: EMERGENCY MEDICINE

## 2023-03-10 PROCEDURE — 85025 COMPLETE CBC W/AUTO DIFF WBC: CPT | Performed by: EMERGENCY MEDICINE

## 2023-03-10 PROCEDURE — 74177 CT ABD & PELVIS W/CONTRAST: CPT | Performed by: EMERGENCY MEDICINE

## 2023-03-10 RX ORDER — KETOROLAC TROMETHAMINE 15 MG/ML
15 INJECTION, SOLUTION INTRAMUSCULAR; INTRAVENOUS ONCE
Status: COMPLETED | OUTPATIENT
Start: 2023-03-10 | End: 2023-03-10

## 2023-03-10 RX ORDER — HYDROCODONE BITARTRATE AND ACETAMINOPHEN 7.5; 325 MG/1; MG/1
1 TABLET ORAL EVERY 6 HOURS PRN
Qty: 10 TABLET | Refills: 0 | Status: SHIPPED | OUTPATIENT
Start: 2023-03-10 | End: 2023-03-15

## 2023-03-10 RX ORDER — MORPHINE SULFATE 2 MG/ML
2 INJECTION, SOLUTION INTRAMUSCULAR; INTRAVENOUS ONCE
Status: COMPLETED | OUTPATIENT
Start: 2023-03-10 | End: 2023-03-10

## 2023-03-10 NOTE — ED INITIAL ASSESSMENT (HPI)
Pt to ED with c/o abdominal pain to left side of abdomen. Pt states nausea and one episode of vomiting. Pt has a Hx of kidney stones. Pt states he also increased ozempic dosage.

## 2023-03-10 NOTE — ED QUICK NOTES
Patient adds to his complaints that it hurts to urinate and that he can only produce a small amount of urine per attempt.

## 2023-03-24 ENCOUNTER — APPOINTMENT (OUTPATIENT)
Dept: CT IMAGING | Facility: HOSPITAL | Age: 68
End: 2023-03-24
Attending: EMERGENCY MEDICINE
Payer: MEDICARE

## 2023-03-24 ENCOUNTER — HOSPITAL ENCOUNTER (EMERGENCY)
Facility: HOSPITAL | Age: 68
Discharge: HOME | End: 2023-03-24
Attending: EMERGENCY MEDICINE
Payer: MEDICARE

## 2023-03-24 ENCOUNTER — LAB ENCOUNTER (OUTPATIENT)
Dept: LAB | Age: 68
End: 2023-03-24
Attending: INTERNAL MEDICINE
Payer: MEDICARE

## 2023-03-24 ENCOUNTER — APPOINTMENT (OUTPATIENT)
Dept: GENERAL RADIOLOGY | Facility: HOSPITAL | Age: 68
End: 2023-03-24
Attending: EMERGENCY MEDICINE
Payer: MEDICARE

## 2023-03-24 VITALS
HEART RATE: 89 BPM | SYSTOLIC BLOOD PRESSURE: 142 MMHG | DIASTOLIC BLOOD PRESSURE: 81 MMHG | TEMPERATURE: 98 F | RESPIRATION RATE: 18 BRPM | OXYGEN SATURATION: 99 %

## 2023-03-24 DIAGNOSIS — S70.11XA CONTUSION OF RIGHT HIP AND THIGH, INITIAL ENCOUNTER: Primary | ICD-10-CM

## 2023-03-24 DIAGNOSIS — S70.01XA CONTUSION OF RIGHT HIP AND THIGH, INITIAL ENCOUNTER: Primary | ICD-10-CM

## 2023-03-24 DIAGNOSIS — E78.5 DYSLIPIDEMIA: ICD-10-CM

## 2023-03-24 DIAGNOSIS — Z00.00 ENCOUNTER FOR ANNUAL HEALTH EXAMINATION: ICD-10-CM

## 2023-03-24 DIAGNOSIS — E11.9 TYPE 2 DIABETES MELLITUS WITHOUT COMPLICATION, WITHOUT LONG-TERM CURRENT USE OF INSULIN (HCC): ICD-10-CM
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
## 2023-03-24 DIAGNOSIS — I10 ESSENTIAL HYPERTENSION: ICD-10-CM

## 2023-03-24 DIAGNOSIS — R79.89 LOW TESTOSTERONE IN MALE: ICD-10-CM

## 2023-03-24 LAB
ALBUMIN SERPL-MCNC: 3.8 G/DL (ref 3.4–5)
ALBUMIN/GLOB SERPL: 0.9 {RATIO} (ref 1–2)
ALP LIVER SERPL-CCNC: 86 U/L
ALT SERPL-CCNC: 25 U/L
ANION GAP SERPL CALC-SCNC: 8 MMOL/L (ref 0–18)
AST SERPL-CCNC: 12 U/L (ref 15–37)
BASOPHILS # BLD AUTO: 0.06 X10(3) UL (ref 0–0.2)
BASOPHILS NFR BLD AUTO: 0.7 %
BILIRUB SERPL-MCNC: 0.6 MG/DL (ref 0.1–2)
BUN BLD-MCNC: 19 MG/DL (ref 7–18)
BUN/CREAT SERPL: 17.4 (ref 10–20)
CALCIUM BLD-MCNC: 9.2 MG/DL (ref 8.5–10.1)
CHLORIDE SERPL-SCNC: 109 MMOL/L (ref 98–112)
CHOLEST SERPL-MCNC: 175 MG/DL (ref ?–200)
CO2 SERPL-SCNC: 23 MMOL/L (ref 21–32)
CREAT BLD-MCNC: 1.09 MG/DL
CREAT UR-SCNC: 221 MG/DL
DEPRECATED RDW RBC AUTO: 42.9 FL (ref 35.1–46.3)
EOSINOPHIL # BLD AUTO: 0.2 X10(3) UL (ref 0–0.7)
EOSINOPHIL NFR BLD AUTO: 2.4 %
ERYTHROCYTE [DISTWIDTH] IN BLOOD BY AUTOMATED COUNT: 12.9 % (ref 11–15)
EST. AVERAGE GLUCOSE BLD GHB EST-MCNC: 103 MG/DL (ref 68–126)
FASTING PATIENT LIPID ANSWER: YES
FASTING STATUS PATIENT QL REPORTED: YES
GFR SERPLBLD BASED ON 1.73 SQ M-ARVRAT: 74 ML/MIN/1.73M2 (ref 60–?)
GLOBULIN PLAS-MCNC: 4.3 G/DL (ref 2.8–4.4)
GLUCOSE BLD-MCNC: 105 MG/DL (ref 70–99)
HBA1C MFR BLD: 5.2 % (ref ?–5.7)
HCT VFR BLD AUTO: 47.8 %
HDLC SERPL-MCNC: 47 MG/DL (ref 40–59)
HGB BLD-MCNC: 16.5 G/DL
IMM GRANULOCYTES # BLD AUTO: 0.02 X10(3) UL (ref 0–1)
IMM GRANULOCYTES NFR BLD: 0.2 %
LDLC SERPL CALC-MCNC: 100 MG/DL (ref ?–100)
LYMPHOCYTES # BLD AUTO: 2.24 X10(3) UL (ref 1–4)
LYMPHOCYTES NFR BLD AUTO: 26.5 %
MCH RBC QN AUTO: 31.5 PG (ref 26–34)
MCHC RBC AUTO-ENTMCNC: 34.5 G/DL (ref 31–37)
MCV RBC AUTO: 91.2 FL
MICROALBUMIN UR-MCNC: 4.31 MG/DL
MICROALBUMIN/CREAT 24H UR-RTO: 19.5 UG/MG (ref ?–30)
MONOCYTES # BLD AUTO: 0.67 X10(3) UL (ref 0.1–1)
MONOCYTES NFR BLD AUTO: 7.9 %
NEUTROPHILS # BLD AUTO: 5.27 X10 (3) UL (ref 1.5–7.7)
NEUTROPHILS # BLD AUTO: 5.27 X10(3) UL (ref 1.5–7.7)
NEUTROPHILS NFR BLD AUTO: 62.3 %
NONHDLC SERPL-MCNC: 128 MG/DL (ref ?–130)
OSMOLALITY SERPL CALC.SUM OF ELEC: 293 MOSM/KG (ref 275–295)
PLATELET # BLD AUTO: 263 10(3)UL (ref 150–450)
POTASSIUM SERPL-SCNC: 3.9 MMOL/L (ref 3.5–5.1)
PROT SERPL-MCNC: 8.1 G/DL (ref 6.4–8.2)
PSA SERPL-MCNC: 1.68 NG/ML (ref ?–4)
RBC # BLD AUTO: 5.24 X10(6)UL
SODIUM SERPL-SCNC: 140 MMOL/L (ref 136–145)
TRIGL SERPL-MCNC: 161 MG/DL (ref 30–149)
TSI SER-ACNC: 1.97 MIU/ML (ref 0.36–3.74)
VLDLC SERPL CALC-MCNC: 27 MG/DL (ref 0–30)
WBC # BLD AUTO: 8.5 X10(3) UL (ref 4–11)

## 2023-03-24 PROCEDURE — 82570 ASSAY OF URINE CREATININE: CPT

## 2023-03-24 PROCEDURE — 99284 EMERGENCY DEPT VISIT MOD MDM: CPT

## 2023-03-24 PROCEDURE — 83036 HEMOGLOBIN GLYCOSYLATED A1C: CPT

## 2023-03-24 PROCEDURE — 84443 ASSAY THYROID STIM HORMONE: CPT

## 2023-03-24 PROCEDURE — 84403 ASSAY OF TOTAL TESTOSTERONE: CPT

## 2023-03-24 PROCEDURE — 80053 COMPREHEN METABOLIC PANEL: CPT

## 2023-03-24 PROCEDURE — 84402 ASSAY OF FREE TESTOSTERONE: CPT

## 2023-03-24 PROCEDURE — 36415 COLL VENOUS BLD VENIPUNCTURE: CPT

## 2023-03-24 PROCEDURE — 73700 CT LOWER EXTREMITY W/O DYE: CPT | Performed by: EMERGENCY MEDICINE

## 2023-03-24 PROCEDURE — 84153 ASSAY OF PSA TOTAL: CPT

## 2023-03-24 PROCEDURE — 82043 UR ALBUMIN QUANTITATIVE: CPT

## 2023-03-24 PROCEDURE — 80061 LIPID PANEL: CPT

## 2023-03-24 PROCEDURE — 73502 X-RAY EXAM HIP UNI 2-3 VIEWS: CPT | Performed by: EMERGENCY MEDICINE

## 2023-03-24 PROCEDURE — 85025 COMPLETE CBC W/AUTO DIFF WBC: CPT

## 2023-03-24 RX ORDER — TRAMADOL HYDROCHLORIDE 50 MG/1
50 TABLET ORAL ONCE
Status: COMPLETED | OUTPATIENT
Start: 2023-03-24 | End: 2023-03-24

## 2023-03-24 RX ORDER — HYDROCODONE BITARTRATE AND ACETAMINOPHEN 5; 325 MG/1; MG/1
1-2 TABLET ORAL EVERY 8 HOURS PRN
Qty: 12 TABLET | Refills: 0 | Status: SHIPPED | OUTPATIENT
Start: 2023-03-24

## 2023-03-24 NOTE — ED INITIAL ASSESSMENT (HPI)
Fall 40 minutes pta. Denies hitting head. No thinners. Complains of right hip pain. Ambulated with a cane into triage.

## 2023-03-25 DIAGNOSIS — E11.9 TYPE 2 DIABETES MELLITUS WITHOUT COMPLICATION, WITHOUT LONG-TERM CURRENT USE OF INSULIN (HCC): ICD-10-CM

## 2023-03-25 RX ORDER — EMPAGLIFLOZIN 25 MG/1
1 TABLET, FILM COATED ORAL DAILY
Qty: 90 TABLET | Refills: 3 | Status: SHIPPED | OUTPATIENT
Start: 2023-03-25

## 2023-03-25 NOTE — TELEPHONE ENCOUNTER
Refill passed per [place], [facility] protocol.      Requested Prescriptions   Pending Prescriptions Disp Refills    JARDIANCE 25 MG Oral Tab [Pharmacy Med Name: Josephine Abiodun 25 MG TABLET] 90 tablet 1     Sig: TAKE 1 TABLET BY MOUTH EVERY DAY       Diabetes Medication Protocol Passed - 3/25/2023 12:59 AM        Passed - Last A1C < 7.5 and within past 6 months     Lab Results   Component Value Date    A1C 5.2 03/24/2023             Passed - In person appointment or virtual visit in the past 6 mos or appointment in next 3 mos     Recent Outpatient Visits              2 weeks ago Encounter for annual health examination    Max Guthrie MD    Office Visit    1 month ago Type 2 diabetes mellitus without complication, without long-term current use of insulin ([address]    Office Visit    5 months ago Type 2 diabetes mellitus without complication, without long-term current use of insulin ([address]    [address] Marci Philippe MD    Office Visit    6 months ago Postnasal drip    [facility] [place], [facility], Shirley Cabrera MD    Telemedicine    9 months ago Type 2 diabetes mellitus without complication, without long-term current use of insulin ([address]    [facility] [place], [address], Marci Pacheco MD    Office Visit          Future Appointments         Provider Department Appt Notes    In 2 weeks MD Missael Ortiz, [facility] kidney stone    In 2 months Jesse Marsh MD [facility], [address], [place] MEDICARE  NON SX F/U    In 3 months Keegan Swan MD [facility], [address], [place] np ee               Passed - EGFRCR or GFRNAA > 50     GFR Evaluation  EGFRCR: 74 , resulted on 3/24/2023          Passed - GFR in the past 12 months Recent Outpatient Visits              2 weeks ago Encounter for annual health examination    Nils Oakes Bernardine Rogers, MD    Office Visit    1 month ago Type 2 diabetes mellitus without complication, without long-term current use of insulin ([address]    Bossman Tsang MD    Office Visit    5 months ago Type 2 diabetes mellitus without complication, without long-term current use of insulin ([address]    Jose Walters MD    Office Visit    6 months ago Postnasal drip    [address], [address] [facility] Nils Rosales Bernardine Rogers, MD    Telemedicine    9 months ago Type 2 diabetes mellitus without complication, without long-term current use of insulin ([facility])    Jose Walters MD    Office Visit             Future Appointments         Provider Department Appt Notes    In 2 weeks Oral MD Jarocho [address], [address] [facility] kidney stone    In 2 months MD Mike Munoz [place] MEDICARE  NON SX F/U    In 3 months Makenna Chowdary MD [address], [address], [place] np

## 2023-03-29 LAB
TESTOSTERONE, FREE, S: 8.3 NG/DL
TESTOSTERONE, TOTAL, S: 269 NG/DL

## 2023-04-10 ENCOUNTER — APPOINTMENT (OUTPATIENT)
Dept: LAB | Facility: HOSPITAL | Age: 68
End: 2023-04-10
Attending: UROLOGY
Payer: MEDICARE

## 2023-04-10 ENCOUNTER — OFFICE VISIT (OUTPATIENT)
Dept: SURGERY | Facility: CLINIC | Age: 68
End: 2023-04-10

## 2023-04-10 VITALS — SYSTOLIC BLOOD PRESSURE: 111 MMHG | HEART RATE: 70 BPM | DIASTOLIC BLOOD PRESSURE: 73 MMHG

## 2023-04-10 DIAGNOSIS — N20.0 NEPHROLITHIASIS: Primary | ICD-10-CM

## 2023-04-10 PROCEDURE — 82365 CALCULUS SPECTROSCOPY: CPT

## 2023-04-10 PROCEDURE — 99203 OFFICE O/P NEW LOW 30 MIN: CPT | Performed by: UROLOGY

## 2023-04-14 LAB
CAOX DIHYDRATE: 20 %
CAOX MONOHYDRATE: 70 %
HYDROXYAPATITE: 10 %
WEIGHT-STONE: 20 MG

## 2023-04-27 ENCOUNTER — TELEPHONE (OUTPATIENT)
Dept: INTERNAL MEDICINE CLINIC | Facility: CLINIC | Age: 68
End: 2023-04-27

## 2023-04-27 DIAGNOSIS — E11.9 TYPE 2 DIABETES MELLITUS WITHOUT COMPLICATION, WITHOUT LONG-TERM CURRENT USE OF INSULIN (HCC): ICD-10-CM

## 2023-04-27 RX ORDER — BLOOD SUGAR DIAGNOSTIC
STRIP MISCELLANEOUS
Qty: 100 STRIP | Refills: 3 | Status: SHIPPED | OUTPATIENT
Start: 2023-04-27

## 2023-06-05 ENCOUNTER — OFFICE VISIT (OUTPATIENT)
Dept: SURGERY | Facility: CLINIC | Age: 68
End: 2023-06-05
Payer: MEDICARE

## 2023-06-05 VITALS
BODY MASS INDEX: 42.38 KG/M2 | OXYGEN SATURATION: 99 % | SYSTOLIC BLOOD PRESSURE: 98 MMHG | DIASTOLIC BLOOD PRESSURE: 63 MMHG | HEART RATE: 62 BPM | WEIGHT: 279.63 LBS | HEIGHT: 68 IN

## 2023-06-05 DIAGNOSIS — Z51.81 ENCOUNTER FOR THERAPEUTIC DRUG MONITORING: ICD-10-CM

## 2023-06-05 DIAGNOSIS — I10 ESSENTIAL HYPERTENSION: ICD-10-CM

## 2023-06-05 DIAGNOSIS — R63.2 BINGE EATING: ICD-10-CM

## 2023-06-05 DIAGNOSIS — E11.9 TYPE 2 DIABETES MELLITUS WITHOUT COMPLICATION, WITHOUT LONG-TERM CURRENT USE OF INSULIN (HCC): Primary | ICD-10-CM

## 2023-06-05 DIAGNOSIS — E66.01 MORBID OBESITY WITH BMI OF 40.0-44.9, ADULT (HCC): ICD-10-CM

## 2023-06-05 PROCEDURE — 99214 OFFICE O/P EST MOD 30 MIN: CPT | Performed by: INTERNAL MEDICINE

## 2023-06-05 RX ORDER — SEMAGLUTIDE 1.34 MG/ML
1 INJECTION, SOLUTION SUBCUTANEOUS
Qty: 9 ML | Refills: 1 | Status: SHIPPED | OUTPATIENT
Start: 2023-06-05 | End: 2023-09-03

## 2023-06-05 RX ORDER — TOPIRAMATE 50 MG/1
50 TABLET, FILM COATED ORAL 2 TIMES DAILY
Qty: 180 TABLET | Refills: 1 | Status: SHIPPED | OUTPATIENT
Start: 2023-06-05 | End: 2023-06-05 | Stop reason: SINTOL

## 2023-07-05 ENCOUNTER — OFFICE VISIT (OUTPATIENT)
Dept: OPHTHALMOLOGY | Facility: CLINIC | Age: 68
End: 2023-07-05

## 2023-07-05 DIAGNOSIS — E11.9 TYPE 2 DIABETES MELLITUS WITHOUT RETINOPATHY (HCC): Primary | ICD-10-CM

## 2023-07-05 DIAGNOSIS — H25.13 AGE-RELATED NUCLEAR CATARACT OF BOTH EYES: ICD-10-CM

## 2023-07-05 DIAGNOSIS — H43.392 VITREOUS FLOATERS OF LEFT EYE: ICD-10-CM

## 2023-07-05 PROCEDURE — 92004 COMPRE OPH EXAM NEW PT 1/>: CPT | Performed by: OPHTHALMOLOGY

## 2023-07-05 NOTE — PATIENT INSTRUCTIONS
Type 2 diabetes mellitus without retinopathy ([facility]  Diabetes type II: no background of retinopathy, no signs of neovascularization noted. Discussed ocular and systemic benefits of blood sugar control. Diagnosis and treatment discussed in detail with patient. Will see patient in 1 year for a diabetic exam    Age-related nuclear cataract of both eyes  Discussed early cataracts with patient. No treatment recommended at this time. Vitreous floaters of left eye   There is no evidence of retinal pathology. All signs and symptoms of retinal detachment/tears explained in detail. Patient instructed to call the office if they experience increase in floaters, increase in flashes of light, loss of vision or curtain or veil effect.

## 2023-07-05 NOTE — PROGRESS NOTES
Jad Wu is a 80year old male. HPI:     HPI    Pt was last seen by [facility] 11/23/21. Pt has been a diabetic for 10 years       Pt's diabetes is currently controlled by pills and injection  Pt checks BS 1x a day  Pt's last blood sugar was 99 this morning  Last HA1C was 5.2 on 3/24/23  Endocrinologist: none    Pt denies any blurred vision at distance and is happy with his OTC reading glasses. Last edited by Vani Hayward OT on 7/5/2023  2:57 PM.        Patient History:  Past Medical History:   Diagnosis Date    Arthritis 01/01/2017    Back problem     strain and discomfort    COPD (chronic obstructive pulmonary disease) ([address]     Deep vein thrombosis ([address] 2013    Diabetes ([address]     DM2 (diabetes mellitus, type 2) ([address]     Ear problems     High blood pressure     High cholesterol     Hyperlipidemia 2013    Morbid obesity with BMI of 45.0-49.9, adult ([address]     Obesity, unspecified     Osteoarthritis     Pulmonary emphysema ([address] MILD    Sleep apnea     does not tolerate machine       Surgical History: Jad Wu has a past surgical history that includes arthroscopy of joint unlisted (Left, 2013); arthroscopy of joint unlisted (Right); knee replacement surgery; colonoscopy (last colonoscopy 10 yrs ago); total knee replacement (R Total Knee june 12, 2017); colonoscopy (N/A, 11/29/2017) (Procedure: COLONOSCOPY;  Surgeon: Conrad Edward MD;  Location: [address] [place] [address] ENDOSCOPY); and colonoscopy (N/A, 2/28/2023) (Procedure: COLONOSCOPY;  Surgeon: Conrad Edward MD;  Location: [facility]).     Family History   Problem Relation Age of Onset    Cancer Mother         Lung cancer    Hypertension Mother     Heart Disease Father     Diabetes Brother     Lipids Other         Family H/o: Hyperlipidemia    Glaucoma Neg     Macular degeneration Neg        Social History:   Social History     Socioeconomic History    Marital status:    Tobacco Use    Smoking status: Some Days     Packs/day: 0.00     Years: 30.00     Pack years: 0.00     Types: Cigarettes     Passive exposure: Never    Smokeless tobacco: Former   Substance and Sexual Activity    Alcohol use: Yes     Comment: Once in a while, drink    Drug use: Never   Other Topics Concern    Caffeine Concern Yes     Comment: Coffee, 2 cups daily; Medications:  Current Outpatient Medications   Medication Sig Dispense Refill    metFORMIN 500 MG Oral Tab Take 1 tablet (500 mg total) by mouth 2 (two) times daily with meals. 180 tablet 3    semaglutide (OZEMPIC, 1 MG/DOSE,) 2 MG/1.5ML Subcutaneous Solution Pen-injector Inject 1 mg into the skin every 7 days. 9 mL 1    Glucose Blood (ONETOUCH VERIO) In Vitro Strip Test once daily as needed. 100 strip 3    Empagliflozin (JARDIANCE) 25 MG Oral Tab Take 1 tablet by mouth daily. 90 tablet 3    ergocalciferol 1.25 MG (50180 UT) Oral Cap Take 1 capsule (50,000 Units total) by mouth once a week. 12 capsule 2    METOPROLOL SUCCINATE ER 25 MG Oral Tablet 24 Hr TAKE 1 TABLET BY MOUTH EVERY DAY 90 tablet 3    LIVALO 4 MG Oral Tab TAKE 1 TABLET BY MOUTH EVERY DAY IN THE EVENING 90 tablet 3    OneTouch Delica Lancets 73F Does not apply Misc TEST TWICE DAILY AS NEEDED 1 Box 3    Naproxen Sodium 220 MG Oral Tab Take 1-2 tablets (220-440 mg total) by mouth 2 (two) times daily. HYDROcodone-acetaminophen 5-325 MG Oral Tab Take 1-2 tablets by mouth every 8 (eight) hours as needed for Pain.  12 tablet 0       Allergies:    Lamisil [Terbinafin*    SWELLING    ROS:     ROS    Positive for: Endocrine, Eyes  Negative for: Constitutional, Gastrointestinal, Neurological, Skin, Genitourinary, Musculoskeletal, HENT, Cardiovascular, Respiratory, Psychiatric, Allergic/Imm, Heme/Lymph  Last edited by Brad Marquis OLUCILA on 7/5/2023  2:10 PM.          PHYSICAL EXAM:     Base Eye Exam       Visual Acuity (Snellen - Linear)         Right Left    Dist sc 20/25 -2 20/30 -2    Near cc 20/20 20/20   +2.50 lens checked for near vision  - Pt prefers +2.75 reading glasses at near              Tonometry (Icare, 2:19 PM)         Right Left    Pressure 20 20              Pupils         Pupils    Right PERRL    Left PERRL              Visual Fields         Left Right     Full Full              Extraocular Movement         Right Left     Full, Ortho Full, Ortho              Dilation       Both eyes: 1.0% Mydriacyl and 2.5% Logan Synephrine @ 2:16 PM                  Slit Lamp and Fundus Exam       Slit Lamp Exam         Right Left    Lids/Lashes Dermatochalasis, Meibomian gland dysfunction Dermatochalasis, Meibomian gland dysfunction    Conjunctiva/Sclera Nasal/temp pinguecula Nasal/temp pinguecula    Cornea Clear Clear    Anterior Chamber Deep and quiet Deep and quiet    Iris Normal Normal    Lens 1+ Nuclear sclerosis, Trace Posterior subcapsular cataract 1+ Nuclear sclerosis, Trace Posterior subcapsular cataract    Vitreous Clear [place]ston ring              Fundus Exam         Right Left    Disc Good rim, Temporal crescent Good rim, Temporal crescent    C/D Ratio 0.3 0.3    Macula Normal-no BDR Normal-no BDR    Vessels Normal Normal    Periphery Normal Normal                  Refraction       Wearing Rx         Sphere Cylinder    Right +2.50 Sphere    Left +2.50 Sphere      Type: OTC reading only              Manifest Refraction (Auto)         Sphere Cylinder Axis    Right -1.00 +0.50 015    Left -1.00 +1.00 165   Pt does not want distance glasses. ASSESSMENT/PLAN:     Diagnoses and Plan:     Type 2 diabetes mellitus without retinopathy ([address]  Diabetes type II: no background of retinopathy, no signs of neovascularization noted. Discussed ocular and systemic benefits of blood sugar control. Diagnosis and treatment discussed in detail with patient. Will see patient in 1 year for a diabetic exam    Age-related nuclear cataract of both eyes  Discussed early cataracts with patient. No treatment recommended at this time. Vitreous floaters of left eye   There is no evidence of retinal pathology. All signs and symptoms of retinal detachment/tears explained in detail. Patient instructed to call the office if they experience increase in floaters, increase in flashes of light, loss of vision or curtain or veil effect. No orders of the defined types were placed in this encounter.       Meds This Visit:  Requested Prescriptions      No prescriptions requested or ordered in this encounter        Follow up instructions:  Return in about 1 year (around 7/5/2024) for Diabetic eye exam.    7/5/2023  Scribed by: Danielle Pablo MD

## 2023-07-06 ENCOUNTER — OFFICE VISIT (OUTPATIENT)
Dept: INTERNAL MEDICINE CLINIC | Facility: CLINIC | Age: 68
End: 2023-07-06

## 2023-07-06 VITALS
BODY MASS INDEX: 42.53 KG/M2 | RESPIRATION RATE: 18 BRPM | DIASTOLIC BLOOD PRESSURE: 62 MMHG | HEART RATE: 73 BPM | SYSTOLIC BLOOD PRESSURE: 108 MMHG | HEIGHT: 68 IN | WEIGHT: 280.63 LBS

## 2023-07-06 DIAGNOSIS — R42 LIGHTHEADEDNESS: Primary | ICD-10-CM

## 2023-07-06 DIAGNOSIS — E11.9 TYPE 2 DIABETES MELLITUS WITHOUT RETINOPATHY (HCC): ICD-10-CM

## 2023-07-06 PROCEDURE — 99214 OFFICE O/P EST MOD 30 MIN: CPT | Performed by: INTERNAL MEDICINE

## 2023-07-06 PROCEDURE — 1126F AMNT PAIN NOTED NONE PRSNT: CPT | Performed by: INTERNAL MEDICINE

## 2023-08-28 ENCOUNTER — TELEPHONE (OUTPATIENT)
Dept: INTERNAL MEDICINE CLINIC | Facility: CLINIC | Age: 68
End: 2023-08-28

## 2023-08-29 ENCOUNTER — MED REC SCAN ONLY (OUTPATIENT)
Dept: INTERNAL MEDICINE CLINIC | Facility: CLINIC | Age: 68
End: 2023-08-29

## 2023-09-11 ENCOUNTER — OFFICE VISIT (OUTPATIENT)
Dept: INTERNAL MEDICINE CLINIC | Facility: CLINIC | Age: 68
End: 2023-09-11

## 2023-09-11 VITALS
HEART RATE: 88 BPM | TEMPERATURE: 99 F | BODY MASS INDEX: 42.92 KG/M2 | HEIGHT: 68 IN | DIASTOLIC BLOOD PRESSURE: 74 MMHG | WEIGHT: 283.19 LBS | SYSTOLIC BLOOD PRESSURE: 121 MMHG

## 2023-09-11 DIAGNOSIS — R11.2 NAUSEA AND VOMITING, UNSPECIFIED VOMITING TYPE: ICD-10-CM

## 2023-09-11 DIAGNOSIS — R10.13 EPIGASTRIC PAIN: Primary | ICD-10-CM

## 2023-09-11 PROBLEM — Q45.3 PANCREATIC ABNORMALITY: Status: ACTIVE | Noted: 2023-09-11

## 2023-09-11 PROBLEM — R10.9 ABDOMINAL PAIN: Status: ACTIVE | Noted: 2023-09-11

## 2023-09-11 PROBLEM — D72.829 LEUKOCYTOSIS, UNSPECIFIED TYPE: Status: ACTIVE | Noted: 2023-09-11

## 2023-09-11 PROCEDURE — 1126F AMNT PAIN NOTED NONE PRSNT: CPT | Performed by: INTERNAL MEDICINE

## 2023-09-11 PROCEDURE — 99214 OFFICE O/P EST MOD 30 MIN: CPT | Performed by: INTERNAL MEDICINE

## 2023-09-11 RX ORDER — PANTOPRAZOLE SODIUM 40 MG/1
40 TABLET, DELAYED RELEASE ORAL
Qty: 30 TABLET | Refills: 0 | Status: ON HOLD | OUTPATIENT
Start: 2023-09-11

## 2023-09-11 RX ORDER — ONDANSETRON 4 MG/1
4 TABLET, ORALLY DISINTEGRATING ORAL EVERY 8 HOURS PRN
Qty: 10 TABLET | Refills: 0 | Status: ON HOLD | OUTPATIENT
Start: 2023-09-11

## 2023-09-11 NOTE — ED INITIAL ASSESSMENT (HPI)
Pt presents for epigastric abd pain with nausea and vomiting since Sunday. Pt denies fevers or diarrhea.  Pt seen by PCP today with oupt labs showing WBC 18 Otezla Pregnancy And Lactation Text: This medication is Pregnancy Category C and it isn't known if it is safe during pregnancy. It is unknown if it is excreted in breast milk.

## 2023-09-11 NOTE — PATIENT INSTRUCTIONS
Await results of blood tests. Take pantoprazole 40 mg every morning, and use ondansetron 3 times daily as needed for nausea. Call or return if no better, and go to the ER if symptoms worsen.

## 2023-09-12 NOTE — ED QUICK NOTES
Orders for admission, patient is aware of plan and ready to go upstairs. Any questions, please call ED RN Gamal Funk at extension [phone].      Patient Covid vaccination status: Fully vaccinated     COVID Test Ordered in ED: None    COVID Suspicion at Admission: N/A    Running Infusions:      Mental Status/LOC at time of transport: a&ox4    Other pertinent information:   CIWA score: N/A   NIH score:  N/A

## 2023-09-12 NOTE — PLAN OF CARE
Problem: Patient Centered Care  Goal: Patient preferences are identified and integrated in the patient's plan of care  Description: Interventions:  - What would you like us to know as we care for you? From home with wife  - Provide timely, complete, and accurate information to patient/family  - Incorporate patient and family knowledge, values, beliefs, and cultural backgrounds into the planning and delivery of care  - Encourage patient/family to participate in care and decision-making at the level they choose  - Honor patient and family perspectives and choices  Outcome: Progressing     Problem: Diabetes/Glucose Control  Goal: Glucose maintained within prescribed range  Description: INTERVENTIONS:  - Monitor Blood Glucose as ordered  - Assess for signs and symptoms of hyperglycemia and hypoglycemia  - Administer ordered medications to maintain glucose within target range  - Assess barriers to adequate nutritional intake and initiate nutrition consult as needed  - Instruct patient on self management of diabetes  Outcome: Progressing     Problem: Patient/Family Goals  Goal: Patient/Family Long Term Goal  Description: Patient's Long Term Goal: Discharge from hospital     Interventions:  - Monitor vital signs and labs  - Administer medications per MD orders  - Pain management  - Update patient and family on plan of care    - See additional Care Plan goals for specific interventions  Outcome: Progressing  Goal: Patient/Family Short Term Goal  Description: Patient's Short Term Goal: Pain management     Interventions:   - See additional Care Plan goals for specific interventions  Outcome: Progressing     Admitted from ED and oriented to unit. A/Ox4. Vital signs stable on room air. CPAP HS. Tolerating diet. IVF infusing. Up with SBA. PRN Morphine for pain management. Safety and fall precautions in place. Call light within reach, frequent rounding done.

## 2023-09-12 NOTE — RESPIRATORY THERAPY NOTE
Pt has Dx of BRYCE and uses at home. Pt wants to use CPAP during his stay at [place]. Medium full face mask with auto settings have been applied on room air.

## 2023-09-12 NOTE — H&P
[facility]    PATIENT'S NAME: Herrera Durham   ATTENDING PHYSICIAN: Reyna Malcolm MD   PATIENT ACCOUNT#:   [de-identified]    LOCATION:  [address]  MEDICAL RECORD #:   [id number]       YOB: 1955  ADMISSION DATE:       09/11/2023    HISTORY AND PHYSICAL EXAMINATION    CHIEF COMPLAINT:  Abdominal pain and abnormal CT scan of the abdomen. HISTORY OF PRESENT ILLNESS:  Patient is a 28-year-old  male who has been having intense midepigastric abdominal pain associated with nausea and dry heaving for the last 2 to 3 days. He had an outpatient blood test done today which showed white blood cell count of 17.0 with left shift. Chemistry and LFTs were roughly unremarkable. In the emergency room today, urinalysis showed no evidence of urinary tract infection. Lipase was negative. CT scan of the abdomen showed fat stranding adjacent to the pancreatic head and uncinate process in second portion of duodenum. Findings nonspecific, may be secondary to interstitial pancreatitis, peptic ulcer disease, or possibly related to underlying malignancy. There is 2 cm area of focal fullness involving the pancreatic head uncinate process which may be sequela of acute pancreatitis, reflects a pancreatic lesion, or possibly reflects a duodenal ulcer. Patient was started on IV Protonix, and he will be admitted to the hospital for further management. PAST MEDICAL HISTORY:  Diabetes mellitus type 2, morbid obesity, obstructive sleep apnea, generalized osteoarthritis, hypertension, hyperlipidemia, emphysema, and history of remote DVT. PAST SURGICAL HISTORY:  Right total knee arthroplasty, left knee arthroscopic procedure. MEDICATIONS:  Please see medication reconciliation list.     ALLERGIES:  Lamisil. FAMILY HISTORY:  Mother had lung cancer. Father had heart disease. SOCIAL HISTORY:  Chronic tobacco use. Social alcohol. No drug use. Lives with his family.   Independent in his basic activities of daily living. REVIEW OF SYSTEMS:  Patient said he ate sausage and pepper 3 days ago. Around 10 minutes later, started having intense epigastric discomfort and later started having nausea and vomiting. Since then, the pain has been persistent associated with dry heaving and poor appetite. Denies any fever or chills. No recent weight loss. Other 12-point review of systems is negative. PHYSICAL EXAMINATION:    GENERAL:  Alert and oriented to time, place and person. Moderate distress. VITAL SIGNS:  Temperature 97.9, pulse 88, respiratory rate 20, blood pressure 121/74, pulse ox 97% on room air. HEENT:  Atraumatic. Oropharynx clear. Dry mucous membranes. Injected conjunctivae bilaterally. Pupils are equal, round and reactive. NECK:  Supple. No lymphadenopathy. Trachea midline. Full range of motion. LUNGS:  Clear to auscultation bilaterally. Normal respiratory effort. HEART:  Regular rate and rhythm. S1 and S2 auscultated. No murmur. ABDOMEN:  Soft, nondistended. Discomfort to palpation in midepigastric area. No guarding or rebound tenderness. EXTREMITIES:  No peripheral edema, clubbing or cyanosis. NEUROLOGIC:  Motor and sensory intact. Cranial nerves II to XII are intact. ASSESSMENT:    1. Abdominal pain with abnormal CT scan with haziness around the second portion of duodenum, pancreatic head, and uncinate process. Rule out peptic ulcer disease versus underlying pancreatic malignancy. 2.   Diabetes mellitus type 2.  3.   Dehydration. PLAN:  Patient will be admitted to general medical floor. IV Protonix. Obtain MRCP with and without contrast.  Pain and nausea control. Clear liquid diet. Gastroenterology consult. Further recommendations to follow.      Dictated By Padmini Gross MD  d: 09/11/2023 19:49:15  t: 09/11/2023 20:14:34  Job [id number]  EG/

## 2023-09-12 NOTE — PLAN OF CARE
Problem: Patient Centered Care  Goal: Patient preferences are identified and integrated in the patient's plan of care  Description: Interventions:  - What would you like us to know as we care for you?   - Provide timely, complete, and accurate information to patient/family  - Incorporate patient and family knowledge, values, beliefs, and cultural backgrounds into the planning and delivery of care  - Encourage patient/family to participate in care and decision-making at the level they choose  - Honor patient and family perspectives and choices  Outcome: Progressing     Problem: Diabetes/Glucose Control  Goal: Glucose maintained within prescribed range  Description: INTERVENTIONS:  - Monitor Blood Glucose as ordered  - Assess for signs and symptoms of hyperglycemia and hypoglycemia  - Administer ordered medications to maintain glucose within target range  - Assess barriers to adequate nutritional intake and initiate nutrition consult as needed  - Instruct patient on self management of diabetes  Outcome: Progressing     Problem: Patient/Family Goals  Goal: Patient/Family Long Term Goal  Description: Patient's Long Term Goal:     Interventions:  -   - See additional Care Plan goals for specific interventions  Outcome: Progressing  Goal: Patient/Family Short Term Goal  Description: Patient's Short Term Goal:     Interventions:   -   - See additional Care Plan goals for specific interventions  Outcome: Progressing     Alert and oriented x4. Reports moderate-severe pain to lower abdomen. Pain managed with prn IV morphine. Plan for MRI of abdomen today. Standby assist with ADLs. Tolerating CLD. IV fluids infusing.  Discharge plans pending medical clearance

## 2023-09-13 NOTE — PLAN OF CARE
Patient alert and oriented x4. RA. Cpap at nights. Acuchecks achs. NPO at midnight for EGD today. IV fluids infusing. Stand by assist. Voiding freely. Call light within reach. Safety measures in place. Problem: Diabetes/Glucose Control  Goal: Glucose maintained within prescribed range  Description: INTERVENTIONS:  - Monitor Blood Glucose as ordered  - Assess for signs and symptoms of hyperglycemia and hypoglycemia  - Administer ordered medications to maintain glucose within target range  - Assess barriers to adequate nutritional intake and initiate nutrition consult as needed  - Instruct patient on self management of diabetes  Outcome: Progressing     Problem: PAIN - ADULT  Goal: Verbalizes/displays adequate comfort level or patient's stated pain goal  Description: INTERVENTIONS:  - Encourage pt to monitor pain and request assistance  - Assess pain using appropriate pain scale  - Administer analgesics based on type and severity of pain and evaluate response  - Implement non-pharmacological measures as appropriate and evaluate response  - Consider cultural and social influences on pain and pain management  - Manage/alleviate anxiety  - Utilize distraction and/or relaxation techniques  - Monitor for opioid side effects  - Notify MD/LIP if interventions unsuccessful or patient reports new pain  - Anticipate increased pain with activity and pre-medicate as appropriate  Outcome: Progressing     Problem: SAFETY ADULT - FALL  Goal: Free from fall injury  Description: INTERVENTIONS:  - Assess pt frequently for physical needs  - Identify cognitive and physical deficits and behaviors that affect risk of falls.   - [place] fall precautions as indicated by assessment.  - Educate pt/family on patient safety including physical limitations  - Instruct pt to call for assistance with activity based on assessment  - Modify environment to reduce risk of injury  - Provide assistive devices as appropriate  - Consider OT/PT consult to assist with strengthening/mobility  - Encourage toileting schedule  Outcome: Progressing     Problem: RISK FOR INFECTION - ADULT  Goal: Absence of fever/infection during anticipated neutropenic period  Description: INTERVENTIONS  - Monitor WBC  - Administer growth factors as ordered  - Implement neutropenic guidelines  Outcome: Progressing

## 2023-09-13 NOTE — PLAN OF CARE
Patient alert and orientated x 4. Patient NPO since midnight. Family beside patient awaiting EGD procedure this afternoon. Patient returned from EGD procedure. alert  oriented and hungry regular menu. Ambulating in room. Voids in bathroom. Call light within reach. All safety measures in place. Patient hoping to discharge tomorrow.           Problem: Patient Centered Care  Goal: Patient preferences are identified and integrated in the patient's plan of care  Description: Interventions:  - What would you like us to know as we care for you?   - Provide timely, complete, and accurate information to patient/family  - Incorporate patient and family knowledge, values, beliefs, and cultural backgrounds into the planning and delivery of care  - Encourage patient/family to participate in care and decision-making at the level they choose  - Honor patient and family perspectives and choices  9/13/2023 1613 by Kiara Ely RN  Outcome: Progressing  9/13/2023 1611 by Kiara Ely RN  Outcome: Progressing  9/13/2023 1425 by Kiara Ely RN  Outcome: Progressing  9/13/2023 1423 by Kiara Ely RN  Outcome: Progressing  9/13/2023 1130 by Kiara Ely RN  Outcome: Progressing     Problem: Diabetes/Glucose Control  Goal: Glucose maintained within prescribed range  Description: INTERVENTIONS:  - Monitor Blood Glucose as ordered  - Assess for signs and symptoms of hyperglycemia and hypoglycemia  - Administer ordered medications to maintain glucose within target range  - Assess barriers to adequate nutritional intake and initiate nutrition consult as needed  - Instruct patient on self management of diabetes  9/13/2023 1613 by Kiara Ely RN  Outcome: Progressing  9/13/2023 1611 by Kiara Ely RN  Outcome: Progressing  9/13/2023 1425 by Kiara Ely RN  Outcome: Progressing  9/13/2023 1423 by Kiara Ely RN  Outcome: Progressing  9/13/2023 1130 by Kiara Ely RN  Outcome: Progressing     Problem: Patient/Family Goals  Goal: Patient/Family Long Term Goal  Description: Patient's Long Term Goal:     Interventions:  -   - See additional Care Plan goals for specific interventions  9/13/2023 1613 by Chhaya Dukes RN  Outcome: Progressing  9/13/2023 1611 by Chhaya Dukes RN  Outcome: Progressing  9/13/2023 1425 by Chhaya Dukes RN  Outcome: Progressing  9/13/2023 1423 by Chhaya Dukes RN  Outcome: Progressing  9/13/2023 1130 by Chhaya Dukes RN  Outcome: Progressing  Goal: Patient/Family Short Term Goal  Description: Patient's Short Term Goal:     Interventions:   -   - See additional Care Plan goals for specific interventions  9/13/2023 1613 by Chhaya Dukes RN  Outcome: Progressing  9/13/2023 1611 by Chhaya Dukes RN  Outcome: Progressing  9/13/2023 1425 by Chhaya Dukes RN  Outcome: Progressing  9/13/2023 1423 by Chhaya Dukes RN  Outcome: Progressing  9/13/2023 1130 by Chhaya Dukes RN  Outcome: Progressing     Problem: PAIN - ADULT  Goal: Verbalizes/displays adequate comfort level or patient's stated pain goal  Description: INTERVENTIONS:  - Encourage pt to monitor pain and request assistance  - Assess pain using appropriate pain scale  - Administer analgesics based on type and severity of pain and evaluate response  - Implement non-pharmacological measures as appropriate and evaluate response  - Consider cultural and social influences on pain and pain management  - Manage/alleviate anxiety  - Utilize distraction and/or relaxation techniques  - Monitor for opioid side effects  - Notify MD/LIP if interventions unsuccessful or patient reports new pain  - Anticipate increased pain with activity and pre-medicate as appropriate  9/13/2023 1613 by Chhaya Dukes RN  Outcome: Progressing  9/13/2023 1611 by Chhaya Dukes RN  Outcome: Progressing  9/13/2023 1425 by Chhaya Dukes RN  Outcome: Progressing  9/13/2023 1423 by Chhaya Dukes RN  Outcome: Progressing  9/13/2023 1130 by Kiara Ely RN  Outcome: Progressing     Problem: RISK FOR INFECTION - ADULT  Goal: Absence of fever/infection during anticipated neutropenic period  Description: INTERVENTIONS  - Monitor WBC  - Administer growth factors as ordered  - Implement neutropenic guidelines  9/13/2023 1613 by Kiara Ely RN  Outcome: Progressing  9/13/2023 1611 by Kiara Ely RN  Outcome: Progressing  9/13/2023 1425 by Kiara Ely RN  Outcome: Progressing  9/13/2023 1423 by Kiara Ely RN  Outcome: Progressing  9/13/2023 1130 by Kiara Ely RN  Outcome: Progressing

## 2023-09-14 NOTE — PLAN OF CARE
Patient alert and orientated. Vitals stable. Denies pain. Tolerating diet. Ambulating independently. Voiding freely. +BM. F/u with GI in 6-8 weeks for MRI/MRCP. Discharge instructions given to patient.  All needs met at this time   Problem: Patient Centered Care  Goal: Patient preferences are identified and integrated in the patient's plan of care  Description: Interventions:  - What would you like us to know as we care for you?   - Provide timely, complete, and accurate information to patient/family  - Incorporate patient and family knowledge, values, beliefs, and cultural backgrounds into the planning and delivery of care  - Encourage patient/family to participate in care and decision-making at the level they choose  - Honor patient and family perspectives and choices  Outcome: Adequate for Discharge     Problem: Diabetes/Glucose Control  Goal: Glucose maintained within prescribed range  Description: INTERVENTIONS:  - Monitor Blood Glucose as ordered  - Assess for signs and symptoms of hyperglycemia and hypoglycemia  - Administer ordered medications to maintain glucose within target range  - Assess barriers to adequate nutritional intake and initiate nutrition consult as needed  - Instruct patient on self management of diabetes  Outcome: Adequate for Discharge     Problem: Patient/Family Goals  Goal: Patient/Family Long Term Goal  Description: Patient's Long Term Goal:     Interventions:  -   - See additional Care Plan goals for specific interventions  Outcome: Adequate for Discharge  Goal: Patient/Family Short Term Goal  Description: Patient's Short Term Goal:     Interventions:   -   - See additional Care Plan goals for specific interventions  Outcome: Adequate for Discharge     Problem: PAIN - ADULT  Goal: Verbalizes/displays adequate comfort level or patient's stated pain goal  Description: INTERVENTIONS:  - Encourage pt to monitor pain and request assistance  - Assess pain using appropriate pain scale  - Administer analgesics based on type and severity of pain and evaluate response  - Implement non-pharmacological measures as appropriate and evaluate response  - Consider cultural and social influences on pain and pain management  - Manage/alleviate anxiety  - Utilize distraction and/or relaxation techniques  - Monitor for opioid side effects  - Notify MD/LIP if interventions unsuccessful or patient reports new pain  - Anticipate increased pain with activity and pre-medicate as appropriate  Outcome: Adequate for Discharge     Problem: RISK FOR INFECTION - ADULT  Goal: Absence of fever/infection during anticipated neutropenic period  Description: INTERVENTIONS  - Monitor WBC  - Administer growth factors as ordered  - Implement neutropenic guidelines  Outcome: Adequate for Discharge     Problem: SAFETY ADULT - FALL  Goal: Free from fall injury  Description: INTERVENTIONS:  - Assess pt frequently for physical needs  - Identify cognitive and physical deficits and behaviors that affect risk of falls.   - [place] fall precautions as indicated by assessment.  - Educate pt/family on patient safety including physical limitations  - Instruct pt to call for assistance with activity based on assessment  - Modify environment to reduce risk of injury  - Provide assistive devices as appropriate  - Consider OT/PT consult to assist with strengthening/mobility  - Encourage toileting schedule  Outcome: Adequate for Discharge

## 2023-09-14 NOTE — PLAN OF CARE
Patient alert and oriented x4. RA. Cpap at nights. Acuchecks achs. Tolerating general diet. No complains of pain or nausea. Saline locked. Stand by assist. Voiding freely. Plan to discharge home today. Call light within reach. Bed in lowest position. Safety measures in place.       Problem: Diabetes/Glucose Control  Goal: Glucose maintained within prescribed range  Description: INTERVENTIONS:  - Monitor Blood Glucose as ordered  - Assess for signs and symptoms of hyperglycemia and hypoglycemia  - Administer ordered medications to maintain glucose within target range  - Assess barriers to adequate nutritional intake and initiate nutrition consult as needed  - Instruct patient on self management of diabetes  Outcome: Progressing     Problem: PAIN - ADULT  Goal: Verbalizes/displays adequate comfort level or patient's stated pain goal  Description: INTERVENTIONS:  - Encourage pt to monitor pain and request assistance  - Assess pain using appropriate pain scale  - Administer analgesics based on type and severity of pain and evaluate response  - Implement non-pharmacological measures as appropriate and evaluate response  - Consider cultural and social influences on pain and pain management  - Manage/alleviate anxiety  - Utilize distraction and/or relaxation techniques  - Monitor for opioid side effects  - Notify MD/LIP if interventions unsuccessful or patient reports new pain  - Anticipate increased pain with activity and pre-medicate as appropriate  Outcome: Progressing     Problem: RISK FOR INFECTION - ADULT  Goal: Absence of fever/infection during anticipated neutropenic period  Description: INTERVENTIONS  - Monitor WBC  - Administer growth factors as ordered  - Implement neutropenic guidelines  Outcome: Progressing     Problem: SAFETY ADULT - FALL  Goal: Free from fall injury  Description: INTERVENTIONS:  - Assess pt frequently for physical needs  - Identify cognitive and physical deficits and behaviors that affect risk of falls.   - [place] fall precautions as indicated by assessment.  - Educate pt/family on patient safety including physical limitations  - Instruct pt to call for assistance with activity based on assessment  - Modify environment to reduce risk of injury  - Provide assistive devices as appropriate  - Consider OT/PT consult to assist with strengthening/mobility  - Encourage toileting schedule  Outcome: Progressing

## 2023-10-11 NOTE — TELEPHONE ENCOUNTER
Refill passed per [place] [employer], [facility] protocol.   Requested Prescriptions   Pending Prescriptions Disp Refills    PANTOPRAZOLE 40 MG Oral Tab EC [Pharmacy Med Name: PANTOPRAZOLE SOD DR 40 MG TAB] 30 tablet 0     Sig: TAKE 1 TABLET BY MOUTH EVERY DAY IN THE MORNING BEFORE BREAKFAST       Gastrointestional Medication Protocol Passed - 10/10/2023 10:47 AM        Passed - In person appointment or virtual visit in the past 12 mos or appointment in next 3 mos     Recent Outpatient Visits              2 weeks [facility]    1 month ago [facility]igastric [facility]    [facility]    3 months ago Nils Ji Siegfried Abbe, MD    Office Visit    3 months ago Type 2 diabetes mellitus without retinopathy [facility])    [address], Marlo Irene MD    Office Visit    4 months ago Type 2 diabetes mellitus without complication, without long-term current use of insulin ([facility].)    [address] [facility] Juan Antonio, Alyson Medina MD    Office Visit          Future Appointments         Provider Department Appt Notes    Tomorrow Jessica Guzmán MD [address], [address], [place] MEDICARE  NON SX F/U    In 9 months Susan Ramos MD [address], [address], [place] EP DM EE                         Recent Outpatient Visits              2 weeks ago [facility]ized abdominal pain    [facility]    [facility]    1 month ago Epigastric pain    [facility]    [facility]    3 months ago Ramon Ji MD    Office Visit 3 months ago Type 2 diabetes mellitus without retinopathy ([facility])    [address], [address], Esther Mckeon MD    Office Visit    4 months ago Type 2 diabetes mellitus without complication, without long-term current use of insulin [facility]    Brooklynn Irene, Natacha Sal MD    Office Visit          Future Appointments         Provider Department Appt Notes    Tomorrow Tate Orantes MD [facility], [address], [place] MEDICARE  NON SX F/U    In 9 months Per Vera MD [address], [address], [facility] DM EE

## 2023-11-02 NOTE — TELEPHONE ENCOUNTER
Current Outpatient Medications:       pantoprazole 40 MG Oral Tab EC, Take 1 tablet (40 mg total) by mouth before breakfast., Disp: 30 tablet, Rfl: 0

## 2023-11-03 NOTE — TELEPHONE ENCOUNTER
Refill passed per [employer], [facility] protocol.   Requested Prescriptions   Pending Prescriptions Disp Refills    pantoprazole 40 MG Oral Tab EC 30 tablet 0     Sig: Take 1 tablet (40 mg total) by mouth before breakfast.       Gastrointestional Medication Protocol Passed - 11/3/2023 11:23 AM        Passed - In person appointment or virtual visit in the past 12 mos or appointment in next 3 mos     Recent Outpatient Visits              3 weeks ago Essential hypertension    Ute Escalante MD    Office Visit    1 month ago Generalized abdominal pain    Nelma Eisenmenger, [place] Zonia Boyden, MD    Office Visit    1 month ago Epigastric pain    Nelma Eisenmenger, Helga Bras, MD    Office Visit    4 months [facility] Moss [facility]    Office Visit    4 months ago Type 2 diabetes mellitus without retinopathy [facility])    Gui Shankar, [address] [facility] [address], Curt Knowles MD    Office Visit          Future Appointments         Provider Department Appt Notes    In 1 month 300 [facility] MRI RM1 (1.5T WIDE) [facility] [id number] per TYSTBERGA    In 4 months MD Gui Arias, [facility]     In 8 months MD Gui Early, [address], [facility] EP DM EE                         Recent Outpatient Visits              3 weeks ago Essential hypertension    O[facility]ia [facility] MD Lindsay    Office Visit    1 month ago Generalized abdominal pain    Nelma Eisenmenger, [place] Zonia Boyden, MD    Office Visit    1 month ago Epigastric pain    Fabian Tello MD    Office Visit    4 months ago [id number] [facility] [address] Brie Cobb MD    Office Visit    4 months ago Type 2 diabetes mellitus without retinopathy [facility])    [address], [address], Marlo Irene MD    Office Visit          Future Appointments         Provider Department Appt Notes    In 1 month 300 [facility] MRI RM1 (1.5T WIDE) [address] [facility]    In 4 months Jessica Guzmán MD [address], [address] [facility]     In 8 months Susan Ramos MD [address], [address], [facility] EP JESSI SETHI

## 2023-12-07 NOTE — TELEPHONE ENCOUNTER
Pt states that he is scheduled for an MRI and he is claustrophobic. Pt is requesting Medication to help him with the claustrophobia for MRI. Pt is requesting a call back.   Please call

## 2023-12-08 NOTE — TELEPHONE ENCOUNTER
I will send ativan/lorazepam tablets. Please let him know he should not drive with this medication.     Thanks    Pearl Munguia MD  [address] - Gastroenterology  12/8/2023  9:43 AM

## 2024-01-10 NOTE — TELEPHONE ENCOUNTER
Verified name and .    Patient reports he tested positive for COVID-19 today 1/10/24 (external results console updated.    Patient reports nasal congestion- denies any difficulty breathing or chest pain at this time.    He is requesting Paxlovid consideration.    Virtual visit scheduled:  Future Appointments   Date Time Provider Department Center   1/10/2024  2:45 PM Raman Shah MD [facility]   3/4/2024 12:45 PM [id number]   2024  8:45 AM Shon Capone MD [facility]

## 2024-01-10 NOTE — PATIENT INSTRUCTIONS
COVID-19 Guidelines - 2022      [facility] is committed to the safety and well-being of our patients, members, employees, and communities. As the COVID-19 pandemic continues to evolve, [facility] is here to provide community members with reliable answers to any questions they may have.     Please review this entire document. It includes information related to exposure, pending tests, positive results and aftercare.     Reliable, evidence-based treatment options for COVID-19 are extremely limited. People with COVID-19 should receive supportive care to help relieve symptoms.       Patients with pending COVID-19 test results should follow all care and home isolation instructions. An [facility] representative will call with your test results. Results will also be available on bitmovin.     Home Isolation  If you have tested positive for COVID-19, you should remain under home isolation and follow the below guidelines:  Stay home for five days.  If you have no symptoms or your symptoms are resolving after five days, you can leave your house.  Continue to wear a mask around others for five additional days.  If you have a fever, continue to stay home until your fever resolves.  If you have a fever with cough or shortness of breath but have not been exposed to someone with COVID-19 and have not tested positive for COVID-19, you should also stay home and away from others for at least 24 hours after your fever is gone and symptoms are improving.    Talk to Your Healthcare Provider    If you test positive for COVID-19, notify your healthcare provider to discuss potential treatment options. Patients at high-risk for severe illness due to advanced age or chronic health conditions may be candidates for outpatient antiviral treatment or monoclonal antibody therapy. These treatments, when available and appropriate, should be given as soon as possible after diagnosis.      Seek Further Care      If  you are awaiting test results or are confirmed positive for COVID-19, and your symptoms worsen at home with symptoms such as: extreme weakness, difficult breathing or unrelenting fevers greater than 100.4° F, you should contact your healthcare provider or the emergency department before arriving for care. Measures to keep everyone safe in this difficult time are changing frequently. Your healthcare provider can help direct you on next steps.      If you have not been exposed or are not aware of an exposure to COVID-19 and are concerned about your symptoms, please contact your healthcare provider with any questions.     10 Ways to Manage Your Health at Home       Stay home from work, school, and other public places. If you must go out, avoid using any kind of public transportation, ridesharing, or taxis.   Carefully monitor your symptoms. If your symptoms get worse, call your healthcare provider immediately.  Get rest and stay hydrated.   If you have a medical appointment, call the healthcare provider ahead of time and tell them that you have or may have COVID-19.  For medical emergencies, call 911 and notify the dispatch personnel that you have or may have COVID-19.   Cover your cough and sneezes.   Wash your hands often with soap and water for at least 20 seconds, or clean your hands with an alcohol-based hand  that contains at least 60% alcohol.   As much as possible, stay in a specific room and away from other people in your home. You should use a separate bathroom, if available. If you need to be around other people in or outside of the home, wear a facemask.   Avoid sharing personal items with other people in your household, like dishes, towels, and bedding.   Clean all surfaces that are touched often, like counters, tabletops, and doorknobs. Use household cleaning sprays or wipes according to the label instructions.         Post-Discharge Follow-up  Please call your primary care provider within two  days of your discharge to arrange for a telehealth follow-up. CDC does not recommend repeat testing after a positive test.    For Those in Close Contact with Someone with COVID-19  Anyone who has been in close contact with someone who has COVID-19 should follow CDC guidelines for quarantine (below).     What counts as close contact?    * You were within six feet of someone who has COVID-19 for at least 15 minutes.  * You provided care at home to someone who is sick with COVID-19.  * You had direct physical contact with the person (touched, hugged, or kissed them).  * You shared eating or drinking utensils.  * They sneezed, coughed, or somehow got respiratory droplets on you.    CDC Guidelines: If you were exposed to someone with COVID-19 (Quarantine)   If you:  Have been boosted  OR  Completed the primary series of Pfizer or Moderna vaccine within the last six months  OR   Completed the primary series of J&J vaccine within the last two months   Wear a mask around others for 10 days    Test on day five, if possible    If you develop symptoms get a test and stay home.   If you:  Completed the primary series of Pfizer or Moderna vaccine over six months ago and are not boosted  OR  Completed the primary series of J&J over two months ago and are not boosted  OR   Are unvaccinated   Stay home for five days. Wear a mask around others for five additional days    Test on day five if possible    If you develop symptoms get a test and stay home.     Additional Information      You can also get more information at the following websites:   Centers for Disease Control & Prevention (CDC)  What to do if you are sick with coronavirus disease 2019, https://www.cdc.gov/coronavirus/2019-ncov/downloads/sick-with-2019-nCoV-fact-sheet.pdf  Centers for Disease Control & Prevention (CDC)  10 things you can do to manage your health at home,  https://www.cdc.gov/coronavirus/2019-ncov/downloads/10Things.pdf  http://www.[facility]/topics-services/diseases-and-conditions/diseases-a-z-list/coronavirus/symptoms-treatment  http://www.[facility].[place]/topics-services/diseases-and-conditions/diseases-a-z-list/coronavirus  https://www.cdc.gov/coronavirus/2019-ncov/   Dupixent Counseling: I discussed with the patient the risks of dupilumab including but not limited to eye infection and irritation, cold sores, injection site reactions, worsening of asthma, allergic reactions and increased risk of parasitic infection.  Live vaccines should be avoided while taking dupilumab. Dupilumab will also interact with certain medications such as warfarin and cyclosporine. The patient understands that monitoring is required and they must alert us or the primary physician if symptoms of infection or other concerning signs are noted.

## 2024-01-10 NOTE — PROGRESS NOTES
Patient ID: José Lennon is a 68 year old male.  Chief Complaint   Patient presents with    Covid          HISTORY OF PRESENT ILLNESS:   Patient presents for above.  This visit is conducted using Telemedicine with live, interactive video and audio.  Patient diagnosed with COVID today after being exposed from his wife who contracted approximately 10 days ago and now developing symptoms of congestion and bodyaches with loss of taste and smell.  He is older than 60 and is diabetic.    Review of Systems   Constitutional:  Positive for fatigue.   HENT:  Positive for congestion.    Eyes: Negative.    Respiratory:  Positive for cough.    Cardiovascular: Negative.    Gastrointestinal: Negative.    Endocrine: Negative.    Genitourinary: Negative.    Musculoskeletal: Negative.    Skin: Negative.    Allergic/Immunologic: Negative.    Neurological:  Positive for headaches.   Hematological: Negative.    Psychiatric/Behavioral: Negative.        MEDICAL HISTORY:     Past Medical History:   Diagnosis Date    Arthritis 01/01/2017    Back problem     strain and discomfort    COPD (chronic obstructive pulmonary disease) ([facility])     Deep vein thrombosis ([facility]) 2013    Diabetes ([facility])     DM2 (diabetes mellitus, type 2) ([facility])     Ear problems     High blood pressure     High cholesterol     Hyperlipidemia 2013    Morbid obesity with BMI of 45.0-49.9, adult ([facility])     Obesity, unspecified     Osteoarthritis     Pulmonary emphysema ([facility]) MILD    Sleep apnea     does not tolerate machine       Past Surgical History:   Procedure Laterality Date    ARTHROSCOPY OF JOINT UNLISTED Left 2013    ARTHROSCOPY OF JOINT UNLISTED Right     COLONOSCOPY      last colonoscopy 10 yrs ago    COLONOSCOPY N/A 11/29/2017    Procedure: COLONOSCOPY;  Surgeon: Rigo Rossi MD;  Location: [facility] ENDOSCOPY    COLONOSCOPY N/A 2/28/2023    Procedure: COLONOSCOPY;  Surgeon: Rigo Rossi MD;  Location: [facility] ENDOSCOPY    KNEE REPLACEMENT SURGERY       TOTAL KNEE REPLACEMENT      R Total Knee june 12, 2017         Current Outpatient Medications:     LORazepam 0.5 MG Oral Tab, Take 1 tablet (0.5 mg total) by mouth every 30 (thirty) minutes as needed for Anxiety (anxiety prior to MRI)., Disp: 2 tablet, Rfl: 0    pantoprazole 40 MG Oral Tab EC, Take 1 tablet (40 mg total) by mouth before breakfast., Disp: 90 tablet, Rfl: 3    semaglutide (OZEMPIC, 0.25 OR 0.5 MG/DOSE,) 2 MG/3ML Subcutaneous Solution Pen-injector, Inject 0.5 mg into the skin once a week., Disp: 9 mL, Rfl: 3    Pitavastatin Calcium (LIVALO) 4 MG Oral Tab, TAKE 1 TABLET BY MOUTH EVERY DAY IN THE EVENING, Disp: 90 tablet, Rfl: 3    metFORMIN 500 MG Oral Tab, Take 1 tablet (500 mg total) by mouth 2 (two) times daily with meals., Disp: 180 tablet, Rfl: 3    Glucose Blood (ONETOUCH VERIO) In Vitro Strip, Test once daily as needed., Disp: 100 strip, Rfl: 3    Empagliflozin (JARDIANCE) 25 MG Oral Tab, Take 1 tablet by mouth daily., Disp: 90 tablet, Rfl: 3    ergocalciferol 1.25 MG (76109 UT) Oral Cap, Take 1 capsule (50,000 Units total) by mouth once a week., Disp: 12 capsule, Rfl: 2    METOPROLOL SUCCINATE ER 25 MG Oral Tablet 24 Hr, TAKE 1 TABLET BY MOUTH EVERY DAY, Disp: 90 tablet, Rfl: 3    OneTouch Delica Lancets 33G Does not apply Misc, TEST TWICE DAILY AS NEEDED, Disp: 1 Box, Rfl: 3    Allergies:  Allergies   Allergen Reactions    Lamisil [Terbinafine] SWELLING       Social History     Socioeconomic History    Marital status:      Spouse name: Not on file    Number of children: Not on file    Years of education: Not on file    Highest education level: Not on file   Occupational History    Not on file   Tobacco Use    Smoking status: Some Days     Packs/day: 0.00     Years: 30.00     Additional pack years: 0.00     Total pack years: 0.00     Types: Cigarettes     Passive exposure: Never    Smokeless tobacco: Former   Vaping Use    Vaping Use: Never used   Substance and Sexual Activity    Alcohol  use: Yes     Comment: Once in a while, drink    Drug use: Never    Sexual activity: Not on file   Other Topics Concern     Service Not Asked    Blood Transfusions Not Asked    Caffeine Concern Yes     Comment: Coffee, 2 cups daily;     Occupational Exposure Not Asked    Hobby Hazards Not Asked    Sleep Concern Not Asked    Stress Concern Not Asked    Weight Concern Not Asked    Special Diet Not Asked    Back Care Not Asked    Exercise Not Asked    Bike Helmet Not Asked    Seat Belt Not Asked    Self-Exams Not Asked   Social History Narrative    Not on file     Social Determinants of Health     Financial Resource Strain: Low Risk  (9/15/2023)    Financial Resource Strain     Difficulty of Paying Living Expenses: Not very hard     Med Affordability: No   Food Insecurity: Not on file   Transportation Needs: No Transportation Needs (9/15/2023)    Transportation Needs     Lack of Transportation: No   Physical Activity: Not on file   Stress: Not on file   Social Connections: Not on file   Housing Stability: Not on file       PHYSICAL EXAM:   Unable to perform vitals or do physical exam as this is a virtual video visit.  Patient appears alert.  No conversational dyspnea or distress.    ASSESSMENT/PLAN:   1. COVID-19  Qualifies for Paxlovid.  Did discuss the interactions with a number of his medications.  Patient prefers not to start medication at this time.  States he will reassess in 1 to 2 days and if symptoms worsen then will let the office know.  Did explain that there is an alternative to Paxlovid called Lagevrio.  Symptomatic treatment discussed.  Quarantine guidelines discussed.    Return if symptoms worsen or fail to improve.    Time spent on encounter  11 minutes   Video time 6 minutes   Documentation time 5 minutes     José Lennon understands video evaluation is not a substitute for face-to-face examination or emergency care. Patient advised to go to ER or call 911 for worsening symptoms or acute  distress.     [facility]  Telehealth Verbal Consent   I conducted a telehealth visit with José Lennon today, 01/10/24, which was completed using two-way, real-time interactive audio and video communication. This has been done in good osmin to provide continuity of care in the best interest of the provider-patient relationship, due to the COVID -19 public health crisis/national emergency where restrictions of face-to-face office visits are ongoing. Every conscious effort was taken to allow for sufficient and adequate time to complete the visit.  The patient was made aware of the limitations of the telehealth visit, including treatment limitations as no physical exam could be performed.  The patient was advised to call 911 or to go to the ER in case there was an emergency.  The patient was also advised of the potential privacy & security concerns related to the telehealth platform.   The patient was made aware of where to find [facility]'s notice of privacy practices, telehealth consent form and other related consent forms and documents.  which are located on the [facility] website. The patient verbally agreed to telehealth consent form, related consents and the risks discussed.    Lastly, the patient confirmed that they were in [place].   Included in this visit, time may have been spent reviewing labs, medications, radiology tests and decision making. Appropriate medical decision-making and tests are ordered as detailed in the plan of care above.  Coding/billing information is submitted for this visit based on complexity of care and/or time spent for the visit.    This note was prepared using Dragon Medical voice recognition dictation software. As a result errors may occur. When identified these errors have been corrected. While every attempt is made to correct errors during dictation discrepancies may still exist.    Raman Shah MD  1/10/2024

## 2024-03-04 NOTE — PROGRESS NOTES
[facility], [place], [place]  [address], [facility] [address]  [facility] [address]  Dept: [phone]         Patient:  José Lennon  :      1955  MRN:      [id number]    Chief Complaint:    Chief Complaint   Patient presents with    Follow - Up    Weight Management       SUBJECTIVE     History of Present Illness:  Sumanth is being seen today for a follow-up for weight management    Past Medical History:   Past Medical History:   Diagnosis Date    Arthritis 2017    Back problem     strain and discomfort    COPD (chronic obstructive pulmonary disease) ([facility])     Deep vein thrombosis ([facility])     Diabetes ([facility])     DM2 (diabetes mellitus, type 2) ([facility])     Ear problems     High blood pressure     High cholesterol     Hyperlipidemia     Morbid obesity with BMI of 45.0-49.9, adult ([facility])     Obesity, unspecified     Osteoarthritis     Pulmonary emphysema ([facility]) MILD    Sleep apnea     does not tolerate machine        Comorbidities:  Diabetes-Improvement?  no, Hypertension-Improvement?  no and Hyperlipidemia-Improvement?  no    OBJECTIVE     Vitals: /80 (BP Location: Right arm, Patient Position: Sitting, Cuff Size: adult)   Pulse 80   Ht 5' 8\" (1.727 m)   Wt (!) 304 lb (137.9 kg)   SpO2 94%   BMI 46.22 kg/m²     Initial weight loss: +15   Total weight loss: -24   Start weight: 328    Wt Readings from Last 3 Encounters:   24 (!) 304 lb (137.9 kg)   10/12/23 289 lb (131.1 kg)   23 287 lb (130.2 kg)         Patient Medications:    Current Outpatient Medications   Medication Sig Dispense Refill    LORazepam 0.5 MG Oral Tab Take 1 tablet (0.5 mg total) by mouth every 30 (thirty) minutes as needed for Anxiety (anxiety prior to MRI). 2 tablet 0    pantoprazole 40 MG Oral Tab EC Take 1 tablet (40 mg total) by mouth before breakfast. 90 tablet 3    semaglutide (OZEMPIC, 0.25 OR 0.5 MG/DOSE,) 2 MG/3ML Subcutaneous Solution Pen-injector  Inject 0.5 mg into the skin once a week. 9 mL 3    Pitavastatin Calcium (LIVALO) 4 MG Oral Tab TAKE 1 TABLET BY MOUTH EVERY DAY IN THE EVENING 90 tablet 3    metFORMIN 500 MG Oral Tab Take 1 tablet (500 mg total) by mouth 2 (two) times daily with meals. 180 tablet 3    Glucose Blood (ONETOUCH VERIO) In Vitro Strip Test once daily as needed. 100 strip 3    Empagliflozin (JARDIANCE) 25 MG Oral Tab Take 1 tablet by mouth daily. 90 tablet 3    ergocalciferol 1.25 MG (74430 UT) Oral Cap Take 1 capsule (50,000 Units total) by mouth once a week. 12 capsule 2    METOPROLOL SUCCINATE ER 25 MG Oral Tablet 24 Hr TAKE 1 TABLET BY MOUTH EVERY DAY 90 tablet 3    OneTouch Delica Lancets 33G Does not apply Misc TEST TWICE DAILY AS NEEDED 1 Box 3     Allergies:  Lamisil [terbinafine]     Social History:    Social History     Socioeconomic History    Marital status:      Spouse name: Not on file    Number of children: Not on file    Years of education: Not on file    Highest education level: Not on file   Occupational History    Not on file   Tobacco Use    Smoking status: Some Days     Packs/day: 0.00     Years: 30.00     Additional pack years: 0.00     Total pack years: 0.00     Types: Cigarettes     Passive exposure: Never    Smokeless tobacco: Former   Vaping Use    Vaping Use: Never used   Substance and Sexual Activity    Alcohol use: Yes     Comment: Once in a while, drink    Drug use: Never    Sexual activity: Not on file   Other Topics Concern     Service Not Asked    Blood Transfusions Not Asked    Caffeine Concern Yes     Comment: Coffee, 2 cups daily;     Occupational Exposure Not Asked    Hobby Hazards Not Asked    Sleep Concern Not Asked    Stress Concern Not Asked    Weight Concern Not Asked    Special Diet Not Asked    Back Care Not Asked    Exercise Not Asked    Bike Helmet Not Asked    Seat Belt Not Asked    Self-Exams Not Asked   Social History Narrative    Not on file     Social Determinants of  Health     Financial Resource Strain: Low Risk  (9/15/2023)    Financial Resource Strain     Difficulty of Paying Living Expenses: Not very hard     Med Affordability: No   Food Insecurity: Not on file   Transportation Needs: No Transportation Needs (9/15/2023)    Transportation Needs     Lack of Transportation: No   Physical Activity: Not on file   Stress: Not on file   Social Connections: Not on file   Housing Stability: Not on file     Surgical History:    Past Surgical History:   Procedure Laterality Date    ARTHROSCOPY OF JOINT UNLISTED Left 2013    ARTHROSCOPY OF JOINT UNLISTED Right     COLONOSCOPY      last colonoscopy 10 yrs ago    COLONOSCOPY N/A 11/29/2017    Procedure: COLONOSCOPY;  Surgeon: Rigo Rossi MD;  Location: [facility] ENDOSCOPY    COLONOSCOPY N/A 2/28/2023    Procedure: COLONOSCOPY;  Surgeon: Rigo Rossi MD;  Location: [facility] ENDOSCOPY    KNEE REPLACEMENT SURGERY      TOTAL KNEE REPLACEMENT      R Total Knee june 12, 2017     Family History:    Family History   Problem Relation Age of Onset    Cancer Mother         Lung cancer    Hypertension Mother     Heart Disease Father     Diabetes Brother     Lipids Other         Family H/o: Hyperlipidemia    Glaucoma Neg     Macular degeneration Neg        Food Journal  Reviewed and Discussed:       Patient has a Food Journal?: no   Patient is reading nutrition labels?  yes  Average Caloric Intake:     Average CHO Intake: >100  Is patient exercising? yes  Type of exercise? Has membership to [employer]    Eating Habits  Patient states the following:  Eats 3 meal(s) per day  Length of time it takes to consume a meal:  15 min  # of snacks per day: 1-2 Type of snacks:  nuts  Amount of soda consumption per day:  diet  Amount of water (in ounces) per day:  <60 oz  Drinking between meals only:  no  Toughest challenge:  exercise    Nutritional Goals  Limit carbohydrates to 100 gms per day, Eat 100-200 calories within 1 hour of waking  and Eat 3-4 cups  of fresh fruits or vegetables daily    Behavior Modifications Reviewed and Discussed  Eat breakfast, Eat 3 meals per day, Plan meals in advance, Read nutrition labels, Drink 64 oz of water per day, Maintain a daily food journal, No drinking 30 minutes before or after meals, Utlize portion control strategies to reduce calorie intake, Identify triggers for eating and manage cues and Eat slowly and take 20 to 30 minutes to complete each meal    Exercise Goals Reviewed and Discussed    Chair exercises for  30 Minutes    ROS:    Review of Systems   Constitutional: Negative.    Respiratory: Negative.     Cardiovascular: Negative.    Gastrointestinal: Negative.    Genitourinary: Negative.    Skin: Negative.    Neurological: Negative.    Psychiatric/Behavioral: Negative.         Physical Exam:   Physical Exam  Vitals reviewed.   Constitutional:       General: He is not in acute distress.     Appearance: He is well-developed.   HENT:      Head: Normocephalic and atraumatic.   Pulmonary:      Effort: Pulmonary effort is normal. No respiratory distress.   Abdominal:      Palpations: Abdomen is soft.   Musculoskeletal:         General: Normal range of motion.      Cervical back: Neck supple.   Skin:     General: Skin is warm and dry.   Neurological:      Mental Status: He is alert and oriented to person, place, and time.   Psychiatric:         Behavior: Behavior normal.         Thought Content: Thought content normal.         Judgment: Judgment normal.       ASSESSMENT     Encounter Diagnosis(ses):   Encounter Diagnoses   Name Primary?    Type 2 diabetes mellitus without complication, without long-term current use of insulin ([facility]) Yes    Dyslipidemia     Encounter for therapeutic drug monitoring     Binge eating     BRYCE (obstructive sleep apnea)     Morbid obesity with BMI of 45.0-49.9, adult ([facility])        PLAN   Patient is not interested in bariatric surgery. Patient desires to pursue traditional weight loss at this time.        HYPERTENSION: Blood pressure stable on the above medications. No interval change in antihypertensive medication;      DYSLIPIDEMIA: Stable on the above prescribed meal plan and medication. Liver function stable.        Lab Results  Component Value Date/Time   CHOLEST 167 08/07/2017 09:06 AM   LDL 99 08/07/2017 09:06 AM   HDL 39 08/07/2017 09:06 AM   TRIG 143 08/07/2017 09:06 AM       DIABETES: Continue current medications.       Patient was instructed to continue wearing their CPaP as recommended.      Bilateral knee pain: has seen Ortho    Increase water intake    Limit/avoid starchy carbs    Goals for next month:  1. Keep a food log using [employer]  2. Drink 48-64 ounces of water per day. No fruit juices or regular soda.  3. Increase aerobic exercises (goal is 150 minutes per week)  4. Increase fruit and vegetable servings/day  5. Keep carbs at or below 100g/day  6. Avoid skipping meals  7. Prepare meals and snacks in advance    Counseled Patient on comprehensive weight loss plan including attention to nutrition, exercise and behavior/stress management for in order to succeed and reach goals.    Reviewed the importance of quality sleep and stress management.     Tolerating Ozempic  Increase dose to 1 mg    SLOW BURNER    Would like to stay off Vyvanse    PPI for GERD given by GI team     Topiramate: discontinue (recent kidney stone)  Hx of kidney stone        Sam Montiel MD

## 2024-03-15 NOTE — TELEPHONE ENCOUNTER
PT CAME INTO  TO RENEW PLACARD THAT WILL  AT THE END OF THE MONTH PLEASE CALL WHEN READY TO PICKUP

## 2024-03-27 NOTE — PROGRESS NOTES
Subjective:   José Lennon is a 69 year old male who presents for a Medicare Subsequent Annual Wellness visit (Pt already had Initial Annual Wellness) and scheduled follow up of multiple significant but stable problems.     Patient presents for above.  Here for his Medicare annual wellness exam.     History of high blood pressure on monotherapy.  Typically well controlled.  No chest pain or shortness of breath with activities although this is limited due to his arthritic conditions.  Compliant with his medication.     History of hypercholesterolemia.  Currently taking Livalo.    History of obstructive sleep apnea on CPAP.  States he is not 100% compliant with his mask.  Does not offer good reason as to why this is the case.     History of type 2 diabetes.  Most recent A1c was less than 6..  On multiple medications currently.  Seeing a weight loss specialist.  Typically has his yearly eye exams in August.     Has arthritis and spinal stenosis.  Needs to follow-up with his orthopedic surgeon.  Does not want surgery.     History of morbid obesity.  Follows with a weight loss specialist      Had colonoscopy in 2023 with repeat to be done in 2033.  Urinates 0-1 times nightly.    History/Other:   Fall Risk Assessment:   He has been screened for Falls and is High Risk. Fall Prevention information provided to patient in After Visit Summary.    Do you feel unsteady when standing or walking?: Yes  Do you worry about falling?: No  Have you fallen in the past year?: No     Cognitive Assessment:   He had a completely normal cognitive assessment - see flowsheet entries     Functional Ability/Status:   José Lennon has some abnormal functions as listed below:  He has Hearing problems based on screening of functional status.He has Walking problems based on screening of functional status.       Depression Screening (PHQ-2/PHQ-9): PHQ-9 TOTAL SCORE: 3  , done 3/27/2024   Trouble falling or staying asleep, or sleeping too  much: 1     Feeling tired or having little energy: 1    Poor appetite or overeatin    If you checked off any problems, how difficult have these problems made it for you to do your work, take care of things at home, or get along with other people?: Not difficult at all    Last [place] Suicide Screening on 3/27/2024 was No Risk.    Advanced Directives:   He does have a Living Will but we do NOT have it on file in Epic.    He does have a POA but we do NOT have it on file in Epic.    Discussed Advance Care Planning with patient (and family/surrogate if present). Standard forms made available to patient in After Visit Summary.      Patient Active Problem List   Diagnosis    Type 2 diabetes mellitus without complication, without long-term current use of insulin (HCC)    Senile cataract    Astigmatism with presbyopia    Osteoarthritis of knees, bilateral    Essential hypertension    Primary osteoarthritis of both knees    Primary osteoarthritis of right knee    Dyslipidemia    Degenerative disc disease, lumbar    Spinal stenosis of lumbar region at multiple levels    Low testosterone in male    Encounter for therapeutic drug monitoring    Binge eating    BRYCE (obstructive sleep apnea)    Facet arthropathy    Age-related nuclear cataract of both eyes    Morbid obesity with BMI of 45.0-49.9, adult (HCC)    Vitreous floaters of left eye    Pancreatic abnormality    Abnormal magnetic resonance cholangiopancreatography (MRCP)    Holbrook's esophagus with dysplasia     Allergies:  He is allergic to lamisil [[id number]].    Current Medications:  Outpatient Medications Marked as Taking for the 3/27/24 encounter (Office Visit) with Raman Shah MD   Medication Sig    semaglutide 4 MG/3ML Subcutaneous Solution Pen-injector Inject 1 mg into the skin once a week.    LORazepam 0.5 MG Oral Tab Take 1 tablet (0.5 mg total) by mouth every 30 (thirty) minutes as needed for Anxiety (anxiety prior to MRI).    pantoprazole 40 MG Oral Tab  EC Take 1 tablet (40 mg total) by mouth before breakfast.    Pitavastatin Calcium (LIVALO) 4 MG Oral Tab TAKE 1 TABLET BY MOUTH EVERY DAY IN THE EVENING    metFORMIN 500 MG Oral Tab Take 1 tablet (500 mg total) by mouth 2 (two) times daily with meals.    Glucose Blood (ONETOUCH VERIO) In Vitro Strip Test once daily as needed.    Empagliflozin (JARDIANCE) 25 MG Oral Tab Take 1 tablet by mouth daily.    ergocalciferol 1.25 MG (87289 UT) Oral Cap Take 1 capsule (50,000 Units total) by mouth once a week.    METOPROLOL SUCCINATE ER 25 MG Oral Tablet 24 Hr TAKE 1 TABLET BY MOUTH EVERY DAY    OneTouch Delica Lancets 33G Does not apply Misc TEST TWICE DAILY AS NEEDED       Medical History:  He  has a past medical history of Arthritis (01/01/2017), Back problem, COPD (chronic obstructive pulmonary disease) ([facility]), Deep vein thrombosis ([facility]) (2013), Diabetes ([facility]), DM2 (diabetes mellitus, type 2) ([facility]), Ear problems, High blood pressure, High cholesterol, Hyperlipidemia (2013), Morbid obesity with BMI of 45.0-49.9, adult ([facility]), Obesity, unspecified, Osteoarthritis, Pulmonary emphysema ([facility]) (MILD), and Sleep apnea.  Surgical History:  He  has a past surgical history that includes arthroscopy of joint unlisted (Left, 2013); arthroscopy of joint unlisted (Right); knee replacement surgery; colonoscopy; total knee replacement; colonoscopy (N/A, 11/29/2017); and colonoscopy (N/A, 2/28/2023).   Family History:  His family history includes Cancer in his mother; Diabetes in his brother; Heart Disease in his father; Hypertension in his mother; Lipids in an other family member.  Social History:  He  reports that he has been smoking cigarettes. He has never been exposed to tobacco smoke. He has quit using smokeless tobacco. He reports current alcohol use. He reports that he does not use drugs.    Tobacco:  He currently smokes tobacco.  Social History    Tobacco Use      Smoking status: Some Days        Packs/day: 0.00        Years:  30.00        Additional pack years: 0.00        Total pack years: 0.00        Types: Cigarettes        Passive exposure: Never      Smokeless tobacco: Former      Tobacco comments: 4 cigs daily     Tobacco Cessation Documentation (Smoking and Smokeless included):  I had an in depth therapy session with José Lennon about his tobacco use risks and options using the USPSTF's Five A's approach:    Ask: José is using tobacco products.  Assess: We asked about/assessed behavioral health risk and factors affecting choice of behavior change goals/methods.  Specifically I asked about readiness to quit tobacco.  Advise: We gave clear, specific, and personalized behavior change advice, including information about personal health harms and benefits. Specifically, he was told that Quitting tobacco is one of the best things he can do for his health. I strongly encouraged him to quit.      Agree: We collaboratively selected appropriate treatment goals and methods based on the patient’s interest in and willingness to change the behavior.   Assist: We used behavior change techniques (self-help and/or counseling), to aid José in achieving agreed-upon goals by acquiring the skills, confidence, and social/environmental supports for behavior change, supplemented with adjunctive medical treatments when appropriate. Additionally, national quitting tobacco aides were discussed.   Arrange: Follow up at next visit- see specific follow up below.    Time Counseled: <1 minutes       CAGE Alcohol Screen:   CAGE screening score of 0 on 3/27/2024, showing low risk of alcohol abuse.      Patient Care Team:  Raman Shah MD as PCP - General (Internal Medicine)  Benito Garcia MD (Anesthesiology)  Johann Lopez MD as Consulting Physician (SURGERY, ORTHOPEDIC)  Van Roberts OD as Consulting Physician (OPTOMETRY)  Rigo Rossi MD as Consulting Physician (GASTROENTEROLOGY)  Lyndsey Drummond MD as Consulting Physician  (ENDOCRINOLOGY)  Sam Montiel MD as Consulting Physician (BARIATRICS)  Ned Aguilar MD (PULMONARY DISEASES)  Ricco Milner MD (SURGERY, ORTHOPEDIC)    Review of Systems   Constitutional: Negative.    HENT: Negative.     Eyes: Negative.    Respiratory: Negative.     Cardiovascular: Negative.    Gastrointestinal: Negative.    Endocrine: Negative.    Genitourinary: Negative.    Musculoskeletal:  Positive for arthralgias.   Skin: Negative.    Allergic/Immunologic: Negative.    Neurological: Negative.    Hematological: Negative.    Psychiatric/Behavioral: Negative.       Objective:   Physical Exam  Constitutional:       Appearance: Normal appearance. He is well-developed.   HENT:      Head: Normocephalic.      Right Ear: Tympanic membrane, ear canal and external ear normal. There is no impacted cerumen.      Left Ear: Tympanic membrane, ear canal and external ear normal. There is no impacted cerumen.   Eyes:      General: No scleral icterus.     Pupils: Pupils are equal, round, and reactive to light.   Neck:      Vascular: No JVD.   Cardiovascular:      Rate and Rhythm: Normal rate and regular rhythm.      Pulses: Normal pulses.      Heart sounds: Normal heart sounds. No murmur heard.  Pulmonary:      Effort: Pulmonary effort is normal. No respiratory distress.      Breath sounds: Normal breath sounds. No stridor. No wheezing, rhonchi or rales.   Chest:      Chest wall: No tenderness.   Abdominal:      General: Abdomen is flat. Bowel sounds are normal. There is no distension.      Palpations: Abdomen is soft.      Tenderness: There is no abdominal tenderness. There is no guarding or rebound.   Musculoskeletal:         General: Normal range of motion.      Cervical back: Normal range of motion.   Lymphadenopathy:      Cervical: No cervical adenopathy.   Skin:     General: Skin is warm.   Neurological:      General: No focal deficit present.      Mental Status: He is alert.      Cranial Nerves: No cranial nerve  deficit.   Psychiatric:         Mood and Affect: Mood normal.         Behavior: Behavior normal.       /72 (BP Location: Right arm, Patient Position: Sitting, Cuff Size: large)   Pulse 77   Temp 98.2 °F (36.8 °C) (Temporal)   Resp 18   Ht 5' 8\" (1.727 m)   Wt 298 lb 3.2 oz (135.3 kg)   SpO2 94%   BMI 45.34 kg/m²  Estimated body mass index is 45.34 kg/m² as calculated from the following:    Height as of this encounter: 5' 8\" (1.727 m).    Weight as of this encounter: 298 lb 3.2 oz (135.3 kg).    Medicare Hearing Assessment:   Hearing Screening    Screening Method: Finger Rub  Finger Rub Result: Pass               Assessment & Plan:   José Lennon is a 69 year old male who presents for a Medicare Assessment.     1. Encounter for annual health examination (Primary)  -     CBC With Differential With Platelet; Future; Expected date: 03/27/2024  -     Comp Metabolic Panel (14); Future; Expected date: 03/27/2024  -     Lipid Panel; Future; Expected date: 03/27/2024  -     TSH W Reflex To Free T4; Future; Expected date: 03/27/2024  -     PSA Total, Diagnostic; Future; Expected date: 03/27/2024    2. Essential hypertension  -     Detailed, Mod Complex (99214)  -     Microalb/Creat Ratio, Random Urine; Future; Expected date: 03/27/2024  - Well controlled.    3. Dyslipidemia  -     Detailed, Mod Complex (99214)  -     Comp Metabolic Panel (14); Future; Expected date: 03/27/2024  -     Lipid Panel; Future; Expected date: 03/27/2024    4. Type 2 diabetes mellitus without complication, without long-term current use of insulin (HCC)  -     Detailed, Mod Complex (99214)  -     Microalb/Creat Ratio, Random Urine; Future; Expected date: 03/27/2024  -     Hemoglobin A1C; Future; Expected date: 03/27/2024  - Has appointment scheduled with eye doctor for later this year.    5. Morbid obesity with BMI of 45.0-49.9, adult (HCC)  -     Detailed, Mod Complex (83334)  - Follow-up with weight loss specialist.    6. Low  testosterone in male  -     Vandana, Mod Complex (99214)  -     Testosterone, Total And Free; Future; Expected date: 03/27/2024    7. Spinal stenosis of lumbar region at multiple levels  -     Vandana, Mod Complex (99214)  - Pain control.    8. Holbrook's esophagus with dysplasia  -     Detailed, Mod Complex (99214)  - Continue PPI.  - Repeat EGD in 2026.    9. Primary osteoarthritis of both knees  -     Detailed, Mod Complex (99214)  - Follow-up with orthopedic surgery.    10. Age-related nuclear cataract of both eyes  -     Detailed, Mod Complex (99214)    11. Astigmatism of both eyes with presbyopia  -     Detailed, Mod Complex (99214)  - Has appointment scheduled with eye doctor for later this year.    12. Vitreous floaters of left eye  -     Detailed, Mod Complex (99214)  - Has appointment scheduled with eye doctor for later this year.    13. BRYCE (obstructive sleep apnea)  -     Vandana, Mod Complex (99214)  - Continue weight loss.    14. Colon cancer screening  - Repeat colonoscopy in 2033.    The patient indicates understanding of these issues and agrees to the plan.  Reinforced healthy diet, lifestyle, and exercise.      Return in about 6 months (around 9/27/2024) for Routine Follow-Up.     Raman Shah MD, 3/27/2024     Supplementary Documentation:   General Health:  In the past six months, have you lost more than 10 pounds without trying?: 2 - No  Has your appetite been poor?: No  Type of Diet: Other  How does the patient maintain a good energy level?: Other  How would you describe your daily physical activity?: Light  How would you describe your current health state?: Fair  How do you maintain positive mental well-being?: Social Interaction;Visiting Friends  On a scale of 0 to 10, with 0 being no pain and 10 being severe pain, what is your pain level?: 0 - (None)  In the past six months, have you experienced urine leakage?: 0-No  At any time do you feel concerned for the safety/well-being of yourself  and/or your children, in your home or elsewhere?: No  Have you had any immunizations at another office such as Influenza, Hepatitis B, Tetanus, or Pneumococcal?: No          José Lennon's SCREENING SCHEDULE   Tests on this list are recommended by your physician but may not be covered, or covered at this frequency, by your insurer.   Please check with your insurance carrier before scheduling to verify coverage.   PREVENTATIVE SERVICES FREQUENCY &  COVERAGE DETAILS LAST COMPLETION DATE   Diabetes Screening    Fasting Blood Sugar / Glucose    One screening every 12 months if never tested or if previously tested but not diagnosed with pre-diabetes   One screening every 6 months if diagnosed with pre-diabetes Lab Results   Component Value Date     (H) 09/14/2023        Cardiovascular Disease Screening    Lipid Panel  Cholesterol  Lipoprotein (HDL)  Triglycerides Covered every 5 years for all Medicare beneficiaries without apparent signs or symptoms of cardiovascular disease Lab Results   Component Value Date    CHOLEST 175 03/24/2023    HDL 47 03/24/2023     (H) 03/24/2023    TRIG 161 (H) 03/24/2023         Electrocardiogram (EKG)   Covered if needed at Welcome to Medicare, and non-screening if indicated for medical reasons 09/12/2023      Ultrasound Screening for Abdominal Aortic Aneurysm (AAA) Covered once in a lifetime for one of the following risk factors    Men who are 65-75 years old and have ever smoked    Anyone with a family history -     Colorectal Cancer Screening  Covered for ages 50-85; only need ONE of the following:    Colonoscopy   Covered every 10 years    Covered every 2 years if patient is at high risk or previous colonoscopy was abnormal 02/28/2023    Health Maintenance   Topic Date Due    Colorectal Cancer Screening  02/28/2033       Flexible Sigmoidoscopy   Covered every 4 years -    Fecal Occult Blood Test Covered annually -   Prostate Cancer Screening    Prostate-Specific  Antigen (PSA) Annually Lab Results   Component Value Date    PSA 1.68 03/24/2023     Health Maintenance   Topic Date Due    PSA  03/24/2025      Immunizations    Influenza Covered once per flu season  Please get every year 09/26/2023  No recommendations at this time    Pneumococcal Each vaccine ([id number] & [id number]) covered once after 65 Prevnar 13: -    [id number]: 12/02/2019     No recommendations at this time    Hepatitis B One screening covered for patients with certain risk factors   -  No recommendations at this time    Tetanus Toxoid Not covered by Medicare Part B unless medically necessary (cut with metal); may be covered with your pharmacy prescription benefits -    Tetanus, Diptheria and Pertusis TD and TDaP Not covered by Medicare Part B -  No recommendations at this time    Zoster Not covered by Medicare Part B; may be covered with your pharmacy  prescription benefits -  No recommendations at this time     Diabetes      Hemoglobin A1C Annually; if last result is elevated, may be asked to retest more frequently.    Medicare covers every 3 months Lab Results   Component Value Date     09/11/2023    A1C 5.8 (H) 09/11/2023       Hemoglobin A1C Test due on 03/11/2024    Creat/alb ratio Annually Lab Results   Component Value Date    MICROALBCREA 19.5 03/24/2023       LDL Annually Lab Results   Component Value Date     (H) 03/24/2023       Dilated Eye Exam Annually Last Diabetic Eye Exam:  Last Dilated Eye Exam: 07/05/23  No data recorded

## 2024-03-27 NOTE — PATIENT INSTRUCTIONS
José Lennon's SCREENING SCHEDULE   Tests on this list are recommended by your physician but may not be covered, or covered at this frequency, by your insurer.   Please check with your insurance carrier before scheduling to verify coverage.   PREVENTATIVE SERVICES FREQUENCY &  COVERAGE DETAILS LAST COMPLETION DATE   Diabetes Screening    Fasting Blood Sugar / Glucose    One screening every 12 months if never tested or if previously tested but not diagnosed with pre-diabetes   One screening every 6 months if diagnosed with pre-diabetes Lab Results   Component Value Date     (H) 09/14/2023        Cardiovascular Disease Screening    Lipid Panel  Cholesterol  Lipoprotein (HDL)  Triglycerides Covered every 5 years for all Medicare beneficiaries without apparent signs or symptoms of cardiovascular disease Lab Results   Component Value Date    CHOLEST 175 03/24/2023    HDL 47 03/24/2023     (H) 03/24/2023    TRIG 161 (H) 03/24/2023         Electrocardiogram (EKG)   Covered if needed at Welcome to Medicare, and non-screening if indicated for medical reasons 09/12/2023      Ultrasound Screening for Abdominal Aortic Aneurysm (AAA) Covered once in a lifetime for one of the following risk factors   • Men who are 65-75 years old and have ever smoked   • Anyone with a family history -     Colorectal Cancer Screening  Covered for ages 50-85; only need ONE of the following:    Colonoscopy   Covered every 10 years    Covered every 2 years if patient is at high risk or previous colonoscopy was abnormal 02/28/2023    Health Maintenance   Topic Date Due   • Colorectal Cancer Screening  02/28/2033       Flexible Sigmoidoscopy   Covered every 4 years -    Fecal Occult Blood Test Covered annually -   Prostate Cancer Screening    Prostate-Specific Antigen (PSA) Annually Lab Results   Component Value Date    PSA 1.68 03/24/2023     Health Maintenance   Topic Date Due   • PSA  03/24/2025      Immunizations    Influenza  Covered once per flu season  Please get every year 09/26/2023  No recommendations at this time    Pneumococcal Each vaccine ([id number] & [id number]) covered once after 65 Prevnar 13: -    Roofljpft52: 12/02/2019     No recommendations at this time    Hepatitis B One screening covered for patients with certain risk factors   -  No recommendations at this time    Tetanus Toxoid Not covered by Medicare Part B unless medically necessary (cut with metal); may be covered with your pharmacy prescription benefits -    Tetanus, Diptheria and Pertusis TD and TDaP Not covered by Medicare Part B -  No recommendations at this time    Zoster Not covered by Medicare Part B; may be covered with your pharmacy  prescription benefits -  No recommendations at this time     Diabetes      Hemoglobin A1C Annually; if last result is elevated, may be asked to retest more frequently.    Medicare covers every 3 months Lab Results   Component Value Date     09/11/2023    A1C 5.8 (H) 09/11/2023       Hemoglobin A1C Test due on 03/11/2024    Creat/alb ratio Annually Lab Results   Component Value Date    MICROALBCREA 19.5 03/24/2023       LDL Annually Lab Results   Component Value Date     (H) 03/24/2023       Dilated Eye Exam Annually Last Diabetic Eye Exam:  Last Dilated Eye Exam: 07/05/23  No data recorded

## 2024-05-01 NOTE — PROGRESS NOTES
Subjective:   Patient ID: José Lennon is a 69 year old male.    MATY Kumar returns in follow-up today along with his wife.  He was last seen by myself at colonoscopy in February 2023 and by Bertram Richter and Carl in September 2023.    As per previous notes the patient has a history of uncomplicated diverticulitis and a personal history of a solitary subcentimeter tubular adenoma removed in 2017.  His last colonoscopy in February 2023 revealed no metachronous polyps.  Uncomplicated diverticulosis was present.  Observation was advised.    José was hospitalized in September 2023 for epigastric abdominal pain.  Laboratory testing was unremarkable, however, a CT scan revealed fat stranding surrounding the pancreatic head, uncinate process and duodenum.  There were also nodular areas of enhancement in the gallbladder wall suggestive of adenomyomatosis (similar to previous).  An MRI/MRCP revealed fullness and a questionable mass in the uncinate process of the pancreas measuring 2.7 cm in size.  No gallstones were seen.  An EUS revealed no pancreatic mass.  Nondysplastic short segment Holbrook's esophagus was present.  Gastric fundic gland polyps were also noted.  A follow-up MRI/MRCP in December 2023 revealed no pancreatic abnormality.  Smoking cessation, PPI maintenance and a 3-year surveillance endoscopy for the short segment Holbrook's was advised.    Current history:  José has been well since hospital discharge.  He has had no recurrent episodes of abdominal pain, however, he has avoided fatty foods as a fatty meal precipitated his symptoms in the fall.  He has lost weight with the assistance of Ozempic.  He denies nausea, vomiting, dysphagia or odynophagia.  He has occasional heartburn for which he takes Rolaids.  He does not believe he is taking pantoprazole.  He denies abdominal pain.  Stools are typically solid although occasionally can be loose on the Ozempic.  He has noted no bleeding.    Unfortunately José  continues to smoke about a half a pack of cigarettes weekly.    History/Other:   Review of Systems  See above    Wt Readings from Last 7 Encounters:   05/01/24 293 lb (132.9 kg)   03/27/24 298 lb 3.2 oz (135.3 kg)   03/04/24 (!) 304 lb (137.9 kg)   10/12/23 289 lb (131.1 kg)   09/26/23 287 lb (130.2 kg)   09/11/23 286 lb (129.7 kg)   09/11/23 283 lb 3.2 oz (128.5 kg)       Current Outpatient Medications   Medication Sig Dispense Refill    pantoprazole 20 MG Oral Tab EC Take 1 tablet (20 mg total) by mouth every morning before breakfast. 90 tablet 3    semaglutide 4 MG/3ML Subcutaneous Solution Pen-injector Inject 1 mg into the skin once a week. 9 mL 1    LORazepam 0.5 MG Oral Tab Take 1 tablet (0.5 mg total) by mouth every 30 (thirty) minutes as needed for Anxiety (anxiety prior to MRI). 2 tablet 0    pantoprazole 40 MG Oral Tab EC Take 1 tablet (40 mg total) by mouth before breakfast. 90 tablet 3    Pitavastatin Calcium (LIVALO) 4 MG Oral Tab TAKE 1 TABLET BY MOUTH EVERY DAY IN THE EVENING 90 tablet 3    metFORMIN 500 MG Oral Tab Take 1 tablet (500 mg total) by mouth 2 (two) times daily with meals. 180 tablet 3    Glucose Blood (ONETOUCH VERIO) In Vitro Strip Test once daily as needed. 100 strip 3    Empagliflozin (JARDIANCE) 25 MG Oral Tab Take 1 tablet by mouth daily. 90 tablet 3    ergocalciferol 1.25 MG (52883 UT) Oral Cap Take 1 capsule (50,000 Units total) by mouth once a week. 12 capsule 2    METOPROLOL SUCCINATE ER 25 MG Oral Tablet 24 Hr TAKE 1 TABLET BY MOUTH EVERY DAY 90 tablet 3    OneTouch Delica Lancets 33G Does not apply Misc TEST TWICE DAILY AS NEEDED 1 Box 3     Allergies:  Allergies   Allergen Reactions    Lamisil [Terbinafine] SWELLING       Objective:   Physical Exam  Vitals and nursing note reviewed.   Constitutional:       General: He is not in acute distress.     Appearance: He is well-developed. He is not ill-appearing, toxic-appearing or diaphoretic.   HENT:      Mouth/Throat:      Pharynx:  No oropharyngeal exudate.   Eyes:      General: No scleral icterus.     Conjunctiva/sclera: Conjunctivae normal.   Neck:      Thyroid: No thyromegaly.   Cardiovascular:      Rate and Rhythm: Normal rate and regular rhythm.      Heart sounds: Normal heart sounds. No murmur heard.  Pulmonary:      Effort: Pulmonary effort is normal. No respiratory distress.      Breath sounds: Normal breath sounds. No wheezing or rales.   Abdominal:      General: Bowel sounds are normal. There is no distension.      Palpations: Abdomen is soft. There is no mass.      Tenderness: There is no abdominal tenderness. There is no guarding or rebound.      Comments: Central adiposity   Musculoskeletal:      Cervical back: Neck supple.   Lymphadenopathy:      Cervical: No cervical adenopathy.   Neurological:      Mental Status: He is alert and oriented to person, place, and time.   Psychiatric:         Behavior: Behavior normal.       PROCEDURE: MRI ABDOMEN&MRCP W/3D (W+WO)(CPT=74183/68583)  MRCP       COMPARISON: [facility], MRI ABDOMEN AND MRCP W/3D (W+W0)(CPT=74183/51532), 9/12/2023, 11:22 AM.     INDICATIONS: Abnromal mrcp and abnromal cat scan follow up     TECHNIQUE: A comprehensive examination was performed utilizing a variety of imaging planes and imaging parameters to optimize visualization of suspected pathology.  Images were obtained before and after intravenous gadolinium infusion.     Magnetic resonance cholangiopancreatography was also performed.       Findings:     Pancreatic head large mint and surrounding pancreatitis have resolved.  Decreased T2 hyperintense signal in the pancreatic head.  No mass or restricted diffusion.  Normal slender pancreatic duct     Normal gallbladder with small foci adenomyomatosis.  No calculi.  Normal biliary tree     Severely fatty liver.  No cirrhotic features or signs of portal hypertension     Stable mild periportal adenopathy which may be due to chronic liver disease or recent  pancreatitis     Normal spleen and adrenals     Normal kidneys with few bilateral simple cysts which require no follow-up     Normal aorta     No ascites              Impression  CONCLUSION:     Resolving pancreatitis at the pancreatic head     No evidence of mass           Dictated by (CST): Van Cr MD on 12/11/2023 at 9:15 AM      Finalized by (CST): Van Cr MD on 12/11/2023 at 9:33 AM        PROCEDURE: MRI ABDOMEN&MRCP W/3D (W+WO)(CPT=74183/00883)  MRCP       COMPARISON: [facility], CT ABDOMEN PELVIS IV CONTRAST NO ORAL (ER), 9/11/2023, 6:31 PM.     INDICATIONS: pancreatic mass     TECHNIQUE: A comprehensive examination was performed utilizing a variety of imaging planes and imaging parameters to optimize visualization of suspected pathology.  Images were obtained before and after intravenous gadolinium infusion.     Magnetic resonance cholangiopancreatography was also performed.       FINDINGS:  LOWER THORAX: No effusions. Cardiac size is normal.  LIVER: Morphology is normal.  Borderline hepatomegaly measuring 19 cm.  Marked loss of signal on out of phase imaging is consistent with hepatic steatosis.  No suspicious focal lesion.    GALLBLADDER: Normal.    BILIARY TREE: No intra or extrahepatic biliary ductal dilatation.    PANCREAS: There is fullness of the pancreatic uncinate process with questionable pancreatic mass measuring 2.7 x 1.8 cm.  There is no pancreatic ductal dilatation.  The head, body, and tail the pancreas has normal parenchymal signal.  No cystic lesions  are identified.  There is mild surrounding fat stranding at the pancreatic uncinate process.  SPLEEN: Normal.  No enlargement or focal lesion.    ADRENALS: Normal.    KIDNEYS: No hydronephrosis.  There are nonenhancing cysts in the kidneys.  RETROPERITONEUM: No enlarged lymph nodes.  PERITONEUM: No free fluid. No enlarged lymph nodes.  GI TRACT: No evidence of bowel obstruction.  There is colonic  diverticulosis.  AORTA & MAJOR BRANCHES: Normal.  VENOUS SYSTEM: Normal.  Portal vein, IVC, splenic, hepatic, renal veins, and mesenteric veins are patent.    ABDOMINAL WALL: Normal.    BONES: Normal.  No bony lesion or fracture.                    Impression   CONCLUSION:     Fullness and questionable mass in the uncinate process of the pancreas in an area measuring up to 2.7 cm.  Consider further evaluation with endoscopic ultrasound, or at a minimum short-term imaging follow-up with MRI/MRCP in 3 months.     Borderline hepatomegaly and hepatic steatosis.           Dictated by (CST): Berto Fernando MD on 9/12/2023 at 12:24 PM      Finalized by (CST): Berto Fernando MD on 9/12/2023 at 12:39 PM           PROCEDURE: CT ABDOMEN PELVIS IV CONTRAST NO ORAL (ER)     COMPARISON: [facility], CT ABDOMEN PELVIS IV CONTRAST NO ORAL (ER), 3/10/2023, 7:24 PM.     INDICATIONS: abd pain     TECHNIQUE: CT images of the abdomen and pelvis were obtained with non-ionic intravenous contrast material.  Automated exposure control for dose reduction was used. Adjustment of the mA and/or kV was done based on the patient's size. Use of iterative  reconstruction technique for dose reduction was used.  Dose information is transmitted to the ACR (American College of Radiology) NRDR (National Radiology Data Registry) which includes the Dose Index Registry.     FINDINGS:  LOWER CHEST: The heart is within normal limits of size.  There is partially visualized coronary artery calcification as well as calcification of the aortic valve and mitral valve annulus.  Minimal bibasilar atelectasis.     HEPATOBILIARY:   There is hepatic steatosis.  There are unchanged small areas of nodular enhancement in gallbladder wall thickening noted near the fundus (2:50, 55, 58), which may reflect adenomyomatosis.  No radiopaque gallstone.  No intrahepatic or  extrahepatic biliary ductal dilatation.     SPLEEN:   No enlargement or focal lesion.        PANCREAS:   There is fat stranding adjacent to the pancreatic head and uncinate process (2:68).  There is a 1.9 x 2.0 cm region of focal fullness in the region of the pancreatic head/uncinate process (2:67).  This lesion also closely abuts the descending   component of the diaphragm.  No pancreatic ductal dilatation.     ADRENALS:   No mass or enlargement.       GENITOURINARY:   No hydronephrosis or urinary calculus.  No needs a renal mass.  There are scattered bilateral renal cysts with the largest in the left midpole measuring 3.4 cm (2:69).  The bladder is incompletely distended, which limits evaluation  although it appears unremarkable.     GI TRACT:   The abnormality adjacent to the duodenum is described above.  No bowel obstruction.  No abnormal bowel wall thickening.  The appendix is not clearly identified, although there is no evidence of acute appendicitis in the right lower quadrant  of the abdomen.  There is colonic diverticulosis without evidence of acute diverticulitis.     PELVIC ORGANS: The prostate is unchanged in size.  There are punctate prosthetic calcifications.     AORTA/VASCULAR:  No aneurysm or dissection.  Mild-to-moderate atherosclerotic calcification of the abdominal aorta and its branching vessels.     RETROPERITONEUM:  No mass or enlarged adenopathy.       PERITONEUM: No pneumoperitoneum or ascites.  There is an unchanged right anterolateral peritoneal calcification (2:84).     LYMPH NODES: There is no significant change in the 1.0 cm periportal lymph node (2:47).  There is a stable 1.5 cm pericaval lymph node (2:55).  No new or enlarging lymph node.     BONES: No acute fracture. No aggressive osseous lesion.  There are bridging ventral disc osteophytes involving the visualized thoracic spine.  There is multilevel degenerative change of the thoracic and lumbar spine with a lower lumbar predominance.    There is additional degenerative change of the right greater than left sacroiliac  joints, bilateral hips, and symphysis pubis.     OTHER:   Negative.                 Impression   CONCLUSION:     1. Fat stranding adjacent to the pancreatic head/uncinate process and 2nd portion of the duodenum.  This finding is nonspecific and may be secondary to acute interstitial pancreatitis, peptic ulcer disease, or possibly be related to underlying  malignancy.  2. A 2.0 cm area of focal fullness involving the pancreatic head/uncinate process, which may be sequela of acute pancreatitis, reflect a pancreatic lesion, or possibly reflect a duodenal ulcer.  Consider further evaluation with direct visualization or an   MRI of the abdomen with without contrast.    3. No bowel obstruction, enteritis/colitis, or acute diverticulitis.  4. No significant change in the scattered areas of gallbladder wall thickening and enhancement, which likely reflects adenomyomatosis.  No acute cholecystitis.  5. Hepatic steatosis.  No focal hepatic lesion.    6. Lesser incidental findings described above.          Dictated by (CST): Edin Garza MD on 9/11/2023 at 7:12 PM      Finalized by (CST): Edin Garza MD on 9/11/2023 at 7:35 PM       Date of procedure: 09/13/23     Procedure: EGD w/ biopsy and EUS esophagus, stomach, duodenum     Pre-operative diagnosis: abdominal pain, abnormal CT abdomen, abnormal MRCP      Post-operative diagnosis: see impression     Sedation: Monitored anesthesia care (MAC)     Consent: We discussed the risks/benefits and alternatives to this procedure, as well as the planned sedation.  Informed consent was obtained from the patient after the risks of the procedure were discussed, including but not limited to bleeding, perforation, aspiration, infection, or possibility of a missed lesion as well as the risks of anesthesia including but not limited to cardiopulmonary complications. The patient signed informed consent and elected to proceed with EGD/EUS with intervention [i.e. Biopsy, control of  bleeding, dilatation, FNA, polypectomy, endoscopic mucosal resection, etc.] as indicated.      EGD & EUS procedure: The lubricated tip of the [id number] diagnostic video upper endoscope was carefully inserted and advanced using direct visualization into the posterior pharynx and ultimately into the esophagus. After the EGD was performed, the gastroscope was removed and the echoendoscope was then carefully inserted through the oropharynx, esophagus intubated, then advanced to the stomach and descending duodenum.    Air was then withdrawn and the echoendoscope was removed. The patient tolerated the procedure well. There were no immediate postoperative complications. The patient’s vital signs were monitored throughout the procedure and remained stable.     Estimated blood loss: insignificant     Specimens collected:  esophageal biopsies, gastric polyp biopsies     Complications: none     EGD findings:    1. Esophagus: The squamocolumnar junction was noted at 44 cm with an overall regular Z line though with a 1 cm segment/tongue of salmon colored mucosa possibly representing Holbrook's esophagus. Multiple cold forceps biopsies were obtained. The esophageal mucosa appeared unremarkable otherwise.  2. Stomach: The stomach distended normally. Normal rugal folds were seen. The pylorus was patent. There were several small and diminutive benign appearing polyps in the stomach. Multiple cold forceps biopsies were obtained. The gastric mucosa appeared unremarkable otherwise. Retroflexion revealed a normal fundus and cardia.   3. Duodenum: The duodenal mucosa appeared normal in the 1st and 2nd portion of the duodenum.      EUS findings:  A linear echoendoscope was used and passed to the duodenum where the head and uncinate process of the pancreas were viewed. The parenchyma overall was unremarkable in appearance without a visualized mass. The bile duct was normal in size at 3.5 mm. The ampulla was not well visualized with  the oblique view of the linear echoendoscope. It was removed and a side viewing duodenoscope was advanced in standard fashion to the 2nd portion of the duodenum with the ampulla visualized and normal in appearance.     Impression:  No visualized pancreas head/uncinate mass  Possible short segment Holbrook's esophagus  Several small/diminutive benign gastric polyps     Recommend:  Await pathology results  Diet as tolerated  Follow up MRI/MRCP in approximately 6-8 weeks     >>>If biopsies were performed and you have not received your pathology results either by phone or letter within 2 weeks, please call our office at [phone].     Dakota Henry MD  [facility] - Gastroenterology  9/13/2023               Electronically signed by Dakota Henry MD at 9/13/2023  2:10 PM      Component      Latest Ref Rng 9/11/2023 9/12/2023 9/13/2023 4/15/2024   WBC      4.0 - 11.0 x10(3) uL 17.0 (H)  14.3 (H)  11.1 (H)  9.3    RBC      3.80 - 5.80 x10(6)uL 5.64  5.09  4.85  5.27    Hemoglobin      13.0 - 17.5 g/dL 17.8 (H)  15.7  15.3  16.8    Hematocrit      39.0 - 53.0 % 51.9  46.2  44.4  48.1    MCV      80.0 - 100.0 fL 92.0  90.8  91.5  91.3    MCH      26.0 - 34.0 pg 31.6  30.8  31.5  31.9    MCHC      31.0 - 37.0 g/dL 34.3  34.0  34.5  34.9    RDW-SD      35.1 - 46.3 fL 42.6  41.7  42.6  43.1    RDW      11.0 - 15.0 % 12.5  12.5  12.7  12.8    Platelet Count      150.0 - 450.0 10(3)uL 274.0  242.0  212.0  276.0    Prelim Neutrophil Abs      1.50 - 7.70 x10 (3) uL 14.16 (H)  10.48 (H)  7.64  5.62    Neutrophils Absolute      1.50 - 7.70 x10(3) uL 14.16 (H)  10.48 (H)  7.64  5.62    Lymphocytes Absolute      1.00 - 4.00 x10(3) uL 1.51  2.14  2.03  2.54    Monocytes Absolute      0.10 - 1.00 x10(3) uL 1.19 (H)  1.49 (H)  1.06 (H)  0.78    Eosinophils Absolute      0.00 - 0.70 x10(3) uL 0.03  0.10  0.24  0.25    Basophils Absolute      0.00 - 0.20 x10(3) uL 0.05  0.04  0.06  0.07     Immature Granulocyte Absolute      0.00 - 1.00 x10(3) uL 0.08  0.04  0.04  0.03    Neutrophils %      % 83.1  73.3  69.0  60.5    Lymphocytes %      % 8.9  15.0  18.3  27.3    Monocytes %      % 7.0  10.4  9.6  8.4    Eosinophils %      % 0.2  0.7  2.2  2.7    Basophils %      % 0.3  0.3  0.5  0.8    Immature Granulocyte %      % 0.5  0.3  0.4  0.3    Glucose      70 - 99 mg/dL 118 (H)  126 (H)   101 (H)    Sodium      136 - 145 mmol/L 136  138   139    Potassium      3.5 - 5.1 mmol/L 4.1  4.0   4.1    Chloride      98 - 112 mmol/L 104  107   109    Carbon Dioxide, Total      21.0 - 32.0 mmol/L 23.0  25.0   26.0    ANION GAP      0 - 18 mmol/L 9  6   4    BUN      9 - 23 mg/dL 13  11   18    CREATININE      0.70 - 1.30 mg/dL 1.02  0.91   1.01    BUN/CREATININE RATIO      10.0 - 20.0  12.7  12.1   17.8    CALCIUM      8.7 - 10.4 mg/dL 9.2  8.8   9.0    CALCULATED OSMOLALITY      275 - 295 mOsm/kg 283  287   290    EGFR      >=60 mL/min/1.73m2 81  92   81    ALT (SGPT)      10 - 49 U/L 26  18   28    AST (SGOT)      <=34 U/L 11 (L)  11 (L)   18    ALKALINE PHOSPHATASE      45 - 117 U/L 92  82   80    Total Bilirubin      0.2 - 1.1 mg/dL 1.1  0.7   0.6    PROTEIN, TOTAL      5.7 - 8.2 g/dL 9.0 (H)  7.8   7.9    Albumin      3.2 - 4.8 g/dL 3.8  3.3 (L)   4.4    Globulin      2.8 - 4.4 g/dL 5.2 (H)  4.5 (H)   3.5    A/G Ratio      1.0 - 2.0  0.7 (L)  0.7 (L)   1.3    Patient Fasting for CMP? Yes    Yes    Lipase      13 - 75 U/L 50  49         Legend:  (H) High  (L) Low    Assessment & Plan:   1. History of pancreatitis  The patient was hospitalized in September 2023 for abdominal pain.  His history and imaging findings were consistent with pancreatitis.  Pancreatic enzymes, however, were normal.  The etiology is unclear.  No definite gallstones were seen by MRI.  Follow-up imaging with both EUS and MRI/MRCP revealed no mass or ductal abnormality.  Based on improvement I would recommend cautious observation.  If the  patient has any recurrent episodes he was asked to contact us.  I would recommend repeat laboratory testing, a gallbladder ultrasound and possibly repeat cross-sectional imaging if this is the case.    2. Holbrook's esophagus without dysplasia  The patient has short segment nondysplastic Holbrook's esophagus on endoscopy in September 2023.  He has occasional reflux symptoms.  It does not appear that he is taking acid suppression.  I have discussed gastroesophageal reflux (including dietary and lifestyle modification) and short segment Holbrook's esophagus.  We discussed that this can increase the risk of esophageal cancer, however, most patients with Holbrook's esophagus do not develop cancer.  I have recommended smoking cessation as smoking is an independent risk factor for multiple cancers including esophageal and pancreatic cancer.  I would recommend at least 20 mg daily of a PPI (will send in an Rx for pantoprazole).  A surveillance endoscopy would be advised in September 2026.    José will contact me with any changes.          Meds This Visit:  Requested Prescriptions     Signed Prescriptions Disp Refills    pantoprazole 20 MG Oral Tab EC 90 tablet 3     Sig: Take 1 tablet (20 mg total) by mouth every morning before breakfast.       Imaging & Referrals:  None

## 2024-05-02 NOTE — PATIENT INSTRUCTIONS
1.  Stop smoking.  2.  Take at least 20 mg daily of pantoprazole.  3.  You are due for a surveillance endoscopy in September 2026.  4.  Please contact us with any recurrent abdominal pain.

## 2024-06-12 NOTE — TELEPHONE ENCOUNTER
Please review. Protocol Failed; No Protocol    Requested Prescriptions   Pending Prescriptions Disp Refills    METOPROLOL SUCCINATE ER 25 MG Oral Tablet 24 Hr [Pharmacy Med Name: METOPROLOL SUCC ER 25 MG TAB] 90 tablet 3     Sig: TAKE 1 TABLET BY MOUTH EVERY DAY       Hypertension Medications Protocol Failed - 6/8/2024 12:28 AM        Failed - Last BP reading less than 140/90     BP Readings from Last 1 Encounters:   05/01/24 153/89               Passed - CMP or BMP in past 12 months        Passed - In person appointment or virtual visit in the past 12 mos or appointment in next 3 mos     Recent Outpatient Visits              1 month ago [facility]    [facility]    2 months ago [facility] for annual health examination    [facility]    Office Visit    3 months ago Type 2 diabetes mellitus without complication, without long-term current use of insulin (HCC)    [facility]    Office Visit    5 months ago COVID-19    [facility]    [facility]    8 months ago Essential hypertension    [facility]    Office Visit          Future Appointments         Provider Department Appt Notes    In 1 month [facility]    In 2 months [facility]                     Passed - EGFRCR or GFRNAA > 50     GFR Evaluation  EGFRCR: 81 , resulted on 4/15/2024                 Future Appointments         Provider Department Appt Notes    In 1 month Shon Capone MD [facility] ep dm ee    In 2 months Sam Montiel MD [facility],  [facility]           Recent Outpatient Visits              1 month ago [facility] of pancreatitis    [facility]    [facility]    2 months ago [facility] for annual health examination    [facility]    [facility] Visit    3 months ago Type 2 diabetes mellitus without complication, without long-term current use of insulin (HCC)    [facility]    [facility] Visit    5 months ago COVID-19    [facility]    [facility]    8 months ago Essential hypertension    [facility]    [facility] Visit

## 2024-07-23 NOTE — TELEPHONE ENCOUNTER
Refill Per Protocol     Requested Prescriptions   Pending Prescriptions Disp Refills    JARDIANCE 25 MG Oral Tab [Pharmacy Med Name: JARDIANCE 25 MG TABLET] 90 tablet 3     Sig: TAKE 1 TABLET BY MOUTH EVERY DAY       Diabetes Medication Protocol Passed - 7/19/2024 12:12 AM        Passed - Last A1C < 7.5 and within past 6 months     Lab Results   Component Value Date    A1C 5.8 (H) 04/15/2024             Passed - In person appointment or virtual visit in the past 6 mos or appointment in next 3 mos     Recent Outpatient Visits              2 months ago [facility]    [facility]    3 months ago [facility] for annual health examination    [facility]    [facility] Visit    4 months ago Type 2 diabetes mellitus without complication, without long-term current use of insulin (HCC)    [facility] Sam Montiel MD    Office Visit    6 months ago COVID-19    [facility]    Telemedicine    9 months ago Essential hypertension    [facility] Sam Montiel MD    Office Visit          Future Appointments         Provider Department Appt Notes    In 1 month Sam Montiel MD [facility]                     Passed - Microalbumin procedure in past 12 months or taking ACE/ARB        Passed - EGFRCR or GFRNAA > 50     GFR Evaluation  EGFRCR: 81 , resulted on 4/15/2024          Passed - GFR in the past 12 months               Future Appointments         Provider Department Appt Notes    In 1 month Sam Montiel MD [facility]           Recent Outpatient Visits              2 months ago History of pancreatitis    [facility]  Rigo Rossi MD    Office Visit    3 months ago Encounter for annual health examination    [facility]    [facility] Visit    4 months ago Type 2 diabetes mellitus without complication, without long-term current use of insulin (HCC)    [facility] [place]    [facility]    6 months ago COVID-19    [facility] Raman Miller [facility]    Telemedicine    9 months ago Essential hypertension    [facility]    [facility]

## 2024-08-26 NOTE — PROGRESS NOTES
[facility], [place], A[place]  [address], [facility] [address]  [facility] [address]  Dept: [phone]         Patient:  José Lennon  :      1955  MRN:      [id number]    Chief Complaint:    Chief Complaint   Patient presents with    Follow - Up    Weight Management       SUBJECTIVE     History of Present Illness:  Sumanth is being seen today for a follow-up for weight management    Past Medical History:   Past Medical History:    Arthritis    Back problem    strain and discomfort    COPD (chronic obstructive pulmonary disease) (HCC)    Deep vein thrombosis (HCC)    Diabetes (HCC)    DM2 (diabetes mellitus, type 2) (HCC)    Ear problems    High blood pressure    High cholesterol    Hyperlipidemia    Morbid obesity with BMI of 45.0-49.9, adult (HCC)    Obesity, unspecified    Osteoarthritis    Pulmonary emphysema (HCC)    Sleep apnea    does not tolerate machine        Comorbidities:  Diabetes-Improvement?  no, Hypertension-Improvement?  no and Hyperlipidemia-Improvement?  no    OBJECTIVE     Vitals: /70 (BP Location: Right arm, Patient Position: Sitting, Cuff Size: adult)   Pulse 81   Ht 5' 8\" (1.727 m)   Wt 295 lb (133.8 kg)   SpO2 95%   BMI 44.85 kg/m²     Initial weight loss: -09   Total weight loss: -33   Start weight: 328    Wt Readings from Last 3 Encounters:   24 295 lb (133.8 kg)   24 293 lb (132.9 kg)   24 298 lb 3.2 oz (135.3 kg)         Patient Medications:    Current Outpatient Medications   Medication Sig Dispense Refill    Empagliflozin (JARDIANCE) 25 MG Oral Tab Take 1 tablet by mouth daily. 90 tablet 3    metoprolol succinate ER 25 MG Oral Tablet 24 Hr Take 1 tablet (25 mg total) by mouth daily. 90 tablet 3    pantoprazole 20 MG Oral Tab EC Take 1 tablet (20 mg total) by mouth every morning before breakfast. 90 tablet 3    LORazepam 0.5 MG Oral Tab Take 1 tablet (0.5 mg total) by mouth every 30 (thirty) minutes  as needed for Anxiety (anxiety prior to MRI). 2 tablet 0    pantoprazole 40 MG Oral Tab EC Take 1 tablet (40 mg total) by mouth before breakfast. 90 tablet 3    Pitavastatin Calcium (LIVALO) 4 MG Oral Tab TAKE 1 TABLET BY MOUTH EVERY DAY IN THE EVENING 90 tablet 3    metFORMIN 500 MG Oral Tab Take 1 tablet (500 mg total) by mouth 2 (two) times daily with meals. 180 tablet 3    Glucose Blood (ONETOUCH VERIO) In Vitro Strip Test once daily as needed. 100 strip 3    ergocalciferol 1.25 MG (10748 UT) Oral Cap Take 1 capsule (50,000 Units total) by mouth once a week. 12 capsule 2    OneTouch Delica Lancets 33G Does not apply Misc TEST TWICE DAILY AS NEEDED 1 Box 3    semaglutide 4 MG/3ML Subcutaneous Solution Pen-injector Inject 1 mg into the skin once a week. 9 mL 1     Allergies:  Lamisil [terbinafine]     Social History:    Social History     Socioeconomic History    Marital status:      Spouse name: Not on file    Number of children: Not on file    Years of education: Not on file    Highest education level: Not on file   Occupational History    Not on file   Tobacco Use    Smoking status: Some Days     Current packs/day: 0.00     Types: Cigarettes     Passive exposure: Never    Smokeless tobacco: Former    Tobacco comments:     4 cigs daily   Vaping Use    Vaping status: Never Used   Substance and Sexual Activity    Alcohol use: Yes     Comment: Once in a while, drink    Drug use: Never    Sexual activity: Yes     Partners: Female   Other Topics Concern     Service Not Asked    Blood Transfusions Not Asked    Caffeine Concern Yes     Comment: Coffee, 2 cups daily;     Occupational Exposure Not Asked    Hobby Hazards Not Asked    Sleep Concern Not Asked    Stress Concern Not Asked    Weight Concern Not Asked    Special Diet Not Asked    Back Care Not Asked    Exercise Not Asked    Bike Helmet Not Asked    Seat Belt Not Asked    Self-Exams Not Asked   Social History Narrative    Not on file     Social  Determinants of Health     Financial Resource Strain: Low Risk  (9/15/2023)    Financial Resource Strain     Difficulty of Paying Living Expenses: Not very hard     Med Affordability: No   Food Insecurity: Not on file   Transportation Needs: No Transportation Needs (9/15/2023)    Transportation Needs     Lack of Transportation: No   Physical Activity: Not on file   Stress: Not on file   Social Connections: Not on file   Housing Stability: Not on file     Surgical History:    Past Surgical History:   Procedure Laterality Date    Arthroscopy of joint unlisted Left 2013    Arthroscopy of joint unlisted Right     Colonoscopy      last colonoscopy 10 yrs ago    Colonoscopy N/A 11/29/2017    Procedure: COLONOSCOPY;  Surgeon: Rigo Rossi MD;  Location: [facility] ENDOSCOPY    Colonoscopy N/A 2/28/2023    Procedure: COLONOSCOPY;  Surgeon: Rigo Rossi MD;  Location: [facility] ENDOSCOPY    Knee replacement surgery      Total knee replacement      R Total Knee june 12, 2017     Family History:    Family History   Problem Relation Age of Onset    Cancer Mother         Lung cancer    Hypertension Mother     Heart Disease Father     Diabetes Brother     Lipids Other         Family H/o: Hyperlipidemia    Glaucoma Neg     Macular degeneration Neg        Food Journal  Reviewed and Discussed:       Patient has a Food Journal?: no   Patient is reading nutrition labels?  yes  Average Caloric Intake:     Average CHO Intake: >100  Is patient exercising? yes  Type of exercise? Has membership to [employer]    Eating Habits  Patient states the following:  Eats 3 meal(s) per day  Length of time it takes to consume a meal:  15 min  # of snacks per day: 1-2 Type of snacks:  nuts  Amount of soda consumption per day:  diet  Amount of water (in ounces) per day:  <60 oz  Drinking between meals only:  no  Toughest challenge:  exercise    Nutritional Goals  Limit carbohydrates to 100 gms per day, Eat 100-200 calories within 1 hour of waking   and Eat 3-4 cups of fresh fruits or vegetables daily    Behavior Modifications Reviewed and Discussed  Eat breakfast, Eat 3 meals per day, Plan meals in advance, Read nutrition labels, Drink 64 oz of water per day, Maintain a daily food journal, No drinking 30 minutes before or after meals, Utlize portion control strategies to reduce calorie intake, Identify triggers for eating and manage cues and Eat slowly and take 20 to 30 minutes to complete each meal    Exercise Goals Reviewed and Discussed    Chair exercises for  30 Minutes    ROS:    Review of Systems   Constitutional: Negative.    Respiratory: Negative.     Cardiovascular: Negative.    Gastrointestinal: Negative.    Genitourinary: Negative.    Skin: Negative.    Neurological: Negative.    Psychiatric/Behavioral: Negative.         Physical Exam:   Physical Exam  Vitals reviewed.   Constitutional:       General: He is not in acute distress.     Appearance: He is well-developed.   HENT:      Head: Normocephalic and atraumatic.   Pulmonary:      Effort: Pulmonary effort is normal. No respiratory distress.   Abdominal:      Palpations: Abdomen is soft.   Musculoskeletal:         General: Normal range of motion.      Cervical back: Neck supple.   Skin:     General: Skin is warm and dry.   Neurological:      Mental Status: He is alert and oriented to person, place, and time.   Psychiatric:         Behavior: Behavior normal.         Thought Content: Thought content normal.         Judgment: Judgment normal.       ASSESSMENT     Encounter Diagnosis(ses):   Encounter Diagnoses   Name Primary?    Type 2 diabetes mellitus without complication, without long-term current use of insulin (HCC) Yes    Dyslipidemia     Encounter for therapeutic drug monitoring     Binge eating     BRYCE (obstructive sleep apnea)     Morbid obesity with BMI of 40.0-44.9, adult (HCC)     Essential hypertension        PLAN   Patient is not interested in bariatric surgery. Patient desires to pursue  traditional weight loss at this time.       HYPERTENSION: Blood pressure stable on the above medications. No interval change in antihypertensive medication;      DYSLIPIDEMIA: Stable on the above prescribed meal plan and medication. Liver function stable.        Lab Results  Component Value Date/Time   CHOLEST 167 08/07/2017 09:06 AM   LDL 99 08/07/2017 09:06 AM   HDL 39 08/07/2017 09:06 AM   TRIG 143 08/07/2017 09:06 AM       DIABETES: Continue current medications.       Patient was instructed to continue wearing their CPaP as recommended.      Bilateral knee pain: has seen Ortho    Increase water intake    Limit/avoid starchy carbs    Goals for next month:  1. Keep a food log using [employer]  2. Drink 48-64 ounces of water per day. No fruit juices or regular soda.  3. Increase aerobic exercises (goal is 150 minutes per week)  4. Increase fruit and vegetable servings/day  5. Keep carbs at or below 100g/day  6. Avoid skipping meals  7. Prepare meals and snacks in advance    Counseled Patient on comprehensive weight loss plan including attention to nutrition, exercise and behavior/stress management for in order to succeed and reach goals.    Reviewed the importance of quality sleep and stress management.     Ozempic causing heartburn    Switch to Mounjaro    SLOW BURNER    Would like to stay off Vyvanse    Will recheck vitamin d and b12    PPI for GERD given by GI team     Topiramate: discontinue (recent kidney stone)  Hx of kidney stone        Sam Montiel MD

## 2024-09-16 NOTE — TELEPHONE ENCOUNTER
Refill Per Protocol     Requested Prescriptions   Pending Prescriptions Disp Refills    ONETOUCH VERIO In Vitro Strip [Pharmacy Med Name: ONE TOUCH VERIO TEST STRIP] 100 strip 3     Sig: Test once daily as needed.       Diabetic Supplies Protocol Passed - 9/12/2024  9:39 AM        Passed - In person appointment or virtual visit in the past 12 mos or appointment in next 3 mos     Recent Outpatient Visits              3 weeks ago Type 2 diabetes mellitus without complication, without long-term current use of insulin ([facility]    [facility]    4 months ago History of pancreatitis    [facility]    [facility] Visit    5 months ago Encounter for annual health examination    [facility]    Office Visit    6 months ago Type 2 diabetes mellitus without complication, without long-term current use of insulin ([facility])    [facility]    [facility] Visit    8 months ago COVID-19    [facility]    [facility]          Future Appointments         Provider Department Appt Notes    In 4 months Sam Montiel MD [facility]                            Future Appointments         Provider Department Appt Notes    In 4 months Sam Montiel MD [facility]           Recent Outpatient Visits              3 weeks ago Type 2 diabetes mellitus without complication, without long-term current use of insulin ([facility])    [facility]    Office Visit    4 months ago History of pancreatitis    [facility]    Office Visit    5 months ago Encounter for  annual health examination    [facility]    Office Visit    6 months ago Type 2 diabetes mellitus without complication, without long-term current use of insulin (HCC)    [facility]    [facility]    8 months ago COVID-19    [facility], Raman Miller MD    Telemedicine

## 2024-10-17 NOTE — TELEPHONE ENCOUNTER
Please review. Protocol Failed; No Protocol    Requested Prescriptions   Pending Prescriptions Disp Refills    METFORMIN 500 MG Oral Tab [Pharmacy Med Name: METFORMIN  MG TABLET] 180 tablet 3     Sig: TAKE 1 TABLET BY MOUTH TWICE A DAY WITH MEALS       Diabetes Medication Protocol Failed - 10/17/2024  4:31 PM        Failed - Last A1C < 7.5 and within past 6 months     Lab Results   Component Value Date    A1C 5.8 (H) 04/15/2024             Failed - In person appointment or virtual visit in the past 6 mos or appointment in next 3 mos     Recent Outpatient Visits              1 month ago Type 2 diabetes mellitus without complication, without long-term current use of insulin ([facility])    [facility] Sam Montiel MD    Office Visit    5 months ago [facility] of pancreatitis    [facility]    [facility] Visit    6 months ago Encounter for annual health examination    [facility] Raman Shah MD    Office Visit    7 months ago Type 2 diabetes mellitus without complication, without long-term current use of insulin ([facility])    [facility]    Office Visit    9 months ago COVID-19    [facility]    [facility]medicine          Future Appointments         Provider Department Appt Notes    In 2 months Sam Montiel MD [facility]                     Passed - Microalbumin procedure in past 12 months or taking ACE/ARB        Passed - EGFRCR or GFRNAA > 50     GFR Evaluation  EGFRCR: 81 , resulted on 4/15/2024          Passed - GFR in the past 12 months               Future Appointments         Provider Department Appt Notes    In 2 months Sam Montiel MD [facility]           Recent Outpatient  Visits              1 month ago Type 2 diabetes mellitus without complication, without long-term current use of insulin ([facility])    [facility]    [facility] Visit    5 months ago History of pancreatitis    [facility]    Office Visit    6 months ago Encounter for annual health examination    [facility]    Office Visit    7 months ago Type 2 diabetes mellitus without complication, without long-term current use of insulin ([facility])    [facility]    Office Visit    9 months ago COVID-19    [facility]

## 2024-11-19 NOTE — PROGRESS NOTES
Patient ID: José Lennon is a 69 year old male.  Chief Complaint   Patient presents with    Follow - Up        HISTORY OF PRESENT ILLNESS:   HPI  Patient presents for above.  Here for 6-month follow-up.    History of type 2 diabetes.  A1c 6 months ago was 5.8.  Since then has been placed on Mounjaro for weight loss purposes.  Has lost 12 pounds in the past few months.    History of morbid obesity.  Following a weight loss specialist.  Has been placed on Mounjaro and has lost 12 pounds.  Feels much better.  Arthritic conditions have improved.    History of hypertension.  Currently on monotherapy.  This is well-controlled.    Review of Systems  Ten point review of systems otherwise negative with the exception of HPI and assessment and plan.    MEDICAL HISTORY:     Past Medical History:    Arthritis    Back problem    strain and discomfort    COPD (chronic obstructive pulmonary disease) (HCC)    Deep vein thrombosis (HCC)    Diabetes (HCC)    DM2 (diabetes mellitus, type 2) ([facility])    Ear problems    High blood pressure    High cholesterol    Hyperlipidemia    Morbid obesity with BMI of 45.0-49.9, adult (HCC)    Obesity, unspecified    Osteoarthritis    Pulmonary emphysema (HCC)    Sleep apnea    does not tolerate machine       Past Surgical History:   Procedure Laterality Date    Arthroscopy of joint unlisted Left 2013    Arthroscopy of joint unlisted Right     Colonoscopy      last colonoscopy 10 yrs ago    Colonoscopy N/A 11/29/2017    Procedure: COLONOSCOPY;  Surgeon: Rigo Rossi MD;  Location: [facility] ENDOSCOPY    Colonoscopy N/A 2/28/2023    Procedure: COLONOSCOPY;  Surgeon: Rigo Rossi MD;  Location: [facility] ENDOSCOPY    Knee replacement surgery      Total knee replacement      R Total Knee june 12, 2017         Current Outpatient Medications:     MOUNJARO 7.5 MG/0.5ML Subcutaneous Solution Auto-injector, Inject 7.5 mg into the skin once a week., Disp: , Rfl:     metFORMIN 500 MG Oral Tab, Take 1  tablet (500 mg total) by mouth 2 (two) times daily with meals., Disp: 180 tablet, Rfl: 3    Glucose Blood (ONETOUCH VERIO) In Vitro Strip, Test once daily as needed., Disp: 100 strip, Rfl: 3    Tirzepatide (MOUNJARO) 7.5 MG/0.5ML Subcutaneous Solution Pen-injector, Inject 7.5 mg into the skin once a week., Disp: 2 mL, Rfl: 3    Empagliflozin (JARDIANCE) 25 MG Oral Tab, Take 1 tablet by mouth daily., Disp: 90 tablet, Rfl: 3    metoprolol succinate ER 25 MG Oral Tablet 24 Hr, Take 1 tablet (25 mg total) by mouth daily., Disp: 90 tablet, Rfl: 3    pantoprazole 20 MG Oral Tab EC, Take 1 tablet (20 mg total) by mouth every morning before breakfast., Disp: 90 tablet, Rfl: 3    LORazepam 0.5 MG Oral Tab, Take 1 tablet (0.5 mg total) by mouth every 30 (thirty) minutes as needed for Anxiety (anxiety prior to MRI)., Disp: 2 tablet, Rfl: 0    pantoprazole 40 MG Oral Tab EC, Take 1 tablet (40 mg total) by mouth before breakfast., Disp: 90 tablet, Rfl: 3    Pitavastatin Calcium (LIVALO) 4 MG Oral Tab, TAKE 1 TABLET BY MOUTH EVERY DAY IN THE EVENING, Disp: 90 tablet, Rfl: 3    ergocalciferol 1.25 MG (15122 UT) Oral Cap, Take 1 capsule (50,000 Units total) by mouth once a week., Disp: 12 capsule, Rfl: 2    OneTouch Delica Lancets 33G Does not apply Misc, TEST TWICE DAILY AS NEEDED, Disp: 1 Box, Rfl: 3    Allergies:Allergies[1]    Social History     Socioeconomic History    Marital status:      Spouse name: Not on file    Number of children: Not on file    Years of education: Not on file    Highest education level: Not on file   Occupational History    Not on file   Tobacco Use    Smoking status: Some Days     Current packs/day: 0.00     Types: Cigarettes     Passive exposure: Never    Smokeless tobacco: Former    Tobacco comments:     4 cigs daily   Vaping Use    Vaping status: Never Used   Substance and Sexual Activity    Alcohol use: Yes     Comment: Once in a while, drink    Drug use: Never    Sexual activity: Yes      Partners: Female   Other Topics Concern     Service Not Asked    Blood Transfusions Not Asked    Caffeine Concern Yes     Comment: Coffee, 2 cups daily;     Occupational Exposure Not Asked    Hobby Hazards Not Asked    Sleep Concern Not Asked    Stress Concern Not Asked    Weight Concern Not Asked    Special Diet Not Asked    Back Care Not Asked    Exercise Not Asked    Bike Helmet Not Asked    Seat Belt Not Asked    Self-Exams Not Asked   Social History Narrative    Not on file     Social Drivers of Health     Financial Resource Strain: Low Risk  (9/15/2023)    Financial Resource Strain     Difficulty of Paying Living Expenses: Not very hard     Med Affordability: No   Food Insecurity: Not on file   Transportation Needs: No Transportation Needs (9/15/2023)    Transportation Needs     Lack of Transportation: No   Physical Activity: Not on file   Stress: Not on file   Social Connections: Not on file   Housing Stability: Not on file           PHYSICAL EXAM:     Vitals:    11/19/24 1715   BP: 122/78   Pulse: 87   Resp: 16   Temp: 97.9 °F (36.6 °C)   TempSrc: Temporal   SpO2: 97%   Weight: 282 lb (127.9 kg)   Height: 5' 8\" (1.727 m)       Body mass index is 42.88 kg/m².    Physical Exam  Constitutional:       Appearance: Normal appearance.   Eyes:      General: No scleral icterus.  Cardiovascular:      Rate and Rhythm: Normal rate and regular rhythm.      Pulses: Normal pulses.      Heart sounds: Normal heart sounds. No murmur heard.  Pulmonary:      Effort: Pulmonary effort is normal. No respiratory distress.      Breath sounds: Normal breath sounds. No stridor. No wheezing or rhonchi.   Neurological:      Mental Status: He is alert.   Psychiatric:         Mood and Affect: Mood normal.         Behavior: Behavior normal.       A1c - 5.6      ASSESSMENT/PLAN:   1. Type 2 diabetes mellitus without complication, without long-term current use of insulin ([facility])  POC Glycohemoglobin [94878]  Very well-controlled now  that on Mounjaro.  Will decrease metformin to 500 mg once a day.  Diabetic diet.    2. Morbid obesity with BMI of 40.0-44.9, adult (HCC)  Continue Mounjaro.  Follow-up with weight loss specialist.    3. Essential hypertension  Well-controlled.  Continue monotherapy.  Low-salt diet.    Return in about 5 months (around 4/19/2025) for Complete physical.    This note was prepared using [employer] voice recognition dictation software. As a result errors may occur. When identified these errors have been corrected. While every attempt is made to correct errors during dictation discrepancies may still exist.    Raman Shah MD  11/19/2024         [1]   Allergies  Allergen Reactions    Lamisil [Terbinafine] SWELLING

## 2025-01-14 NOTE — PROGRESS NOTES
[facility], [place], AIDAN  [address], [facility] [address]  [facility] [address]  Dept: [phone]         Patient:  José Lennon  :      1955  MRN:      [id number]    Chief Complaint:    Chief Complaint   Patient presents with    Follow - Up     Down 24       SUBJECTIVE     History of Present Illness:  Sumanth is being seen today for a follow-up for weight management    Past Medical History:   Past Medical History:    Arthritis    Back problem    strain and discomfort    COPD (chronic obstructive pulmonary disease) (HCC)    Deep vein thrombosis (HCC)    Diabetes (HCC)    DM2 (diabetes mellitus, type 2) (HCC)    Ear problems    High blood pressure    High cholesterol    Hyperlipidemia    Morbid obesity with BMI of 45.0-49.9, adult (HCC)    Obesity, unspecified    Osteoarthritis    Pulmonary emphysema (HCC)    Sleep apnea    does not tolerate machine        Comorbidities:  Diabetes-Improvement?  no, Hypertension-Improvement?  no and Hyperlipidemia-Improvement?  no    OBJECTIVE     Vitals: /78   Pulse 110   Resp 16   Ht 5' 8\" (1.727 m)   Wt 271 lb (122.9 kg)   SpO2 95%   BMI 41.21 kg/m²     Initial weight loss: -24   Total weight loss: -57   Start weight: 328    Wt Readings from Last 3 Encounters:   25 271 lb (122.9 kg)   24 282 lb (127.9 kg)   24 295 lb (133.8 kg)         Patient Medications:    Current Outpatient Medications   Medication Sig Dispense Refill    Tirzepatide (MOUNJARO) 7.5 MG/0.5ML Subcutaneous Solution Auto-injector Inject 7.5 mg into the skin once a week. 3 mL 5    metFORMIN 500 MG Oral Tab Take 1 tablet (500 mg total) by mouth 2 (two) times daily with meals. (Patient taking differently: Take 1 tablet (500 mg total) by mouth daily.) 180 tablet 3    Glucose Blood (ONETOUCH VERIO) In Vitro Strip Test once daily as needed. 100 strip 3    Empagliflozin (JARDIANCE) 25 MG Oral Tab Take 1 tablet by mouth daily. 90  tablet 3    metoprolol succinate ER 25 MG Oral Tablet 24 Hr Take 1 tablet (25 mg total) by mouth daily. 90 tablet 3    pantoprazole 20 MG Oral Tab EC Take 1 tablet (20 mg total) by mouth every morning before breakfast. 90 tablet 3    LORazepam 0.5 MG Oral Tab Take 1 tablet (0.5 mg total) by mouth every 30 (thirty) minutes as needed for Anxiety (anxiety prior to MRI). 2 tablet 0    pantoprazole 40 MG Oral Tab EC Take 1 tablet (40 mg total) by mouth before breakfast. 90 tablet 3    Pitavastatin Calcium (LIVALO) 4 MG Oral Tab TAKE 1 TABLET BY MOUTH EVERY DAY IN THE EVENING 90 tablet 3    ergocalciferol 1.25 MG (95379 UT) Oral Cap Take 1 capsule (50,000 Units total) by mouth once a week. 12 capsule 2    OneTouch Delica Lancets 33G Does not apply Misc TEST TWICE DAILY AS NEEDED 1 Box 3     Allergies:  Lamisil [terbinafine]     Social History:    Social History     Socioeconomic History    Marital status:      Spouse name: Not on file    Number of children: Not on file    Years of education: Not on file    Highest education level: Not on file   Occupational History    Not on file   Tobacco Use    Smoking status: Some Days     Current packs/day: 0.00     Types: Cigarettes     Passive exposure: Never    Smokeless tobacco: Former    Tobacco comments:     4 cigs daily   Vaping Use    Vaping status: Never Used   Substance and Sexual Activity    Alcohol use: Yes     Comment: Once in a while, drink    Drug use: Never    Sexual activity: Yes     Partners: Female   Other Topics Concern     Service Not Asked    Blood Transfusions Not Asked    Caffeine Concern Yes     Comment: Coffee, 2 cups daily;     Occupational Exposure Not Asked    Hobby Hazards Not Asked    Sleep Concern Not Asked    Stress Concern Not Asked    Weight Concern Not Asked    Special Diet Not Asked    Back Care Not Asked    Exercise Not Asked    Bike Helmet Not Asked    Seat Belt Not Asked    Self-Exams Not Asked   Social History Narrative    Not on  file     Social Drivers of Health     Financial Resource Strain: Low Risk  (9/15/2023)    Financial Resource Strain     Difficulty of Paying Living Expenses: Not very hard     Med Affordability: No   Food Insecurity: Not on file   Transportation Needs: No Transportation Needs (9/15/2023)    Transportation Needs     Lack of Transportation: No   Physical Activity: Not on file   Stress: Not on file   Social Connections: Not on file   Housing Stability: Not on file     Surgical History:    Past Surgical History:   Procedure Laterality Date    Arthroscopy of joint unlisted Left 2013    Arthroscopy of joint unlisted Right     Colonoscopy      last colonoscopy 10 yrs ago    Colonoscopy N/A 11/29/2017    Procedure: COLONOSCOPY;  Surgeon: Rigo Rossi MD;  Location: [facility] ENDOSCOPY    Colonoscopy N/A 2/28/2023    Procedure: COLONOSCOPY;  Surgeon: Rigo Rossi MD;  Location: [facility] ENDOSCOPY    Knee replacement surgery      Total knee replacement      R Total Knee june 12, 2017     Family History:    Family History   Problem Relation Age of Onset    Cancer Mother         Lung cancer    Hypertension Mother     Heart Disease Father     Diabetes Brother     Lipids Other         Family H/o: Hyperlipidemia    Glaucoma Neg     Macular degeneration Neg        Food Journal  Reviewed and Discussed:       Patient has a Food Journal?: no   Patient is reading nutrition labels?  yes  Average Caloric Intake:     Average CHO Intake: >100  Is patient exercising? yes  Type of exercise? Has membership to [employer]    Eating Habits  Patient states the following:  Eats 3 meal(s) per day  Length of time it takes to consume a meal:  15 min  # of snacks per day: 1-2 Type of snacks:  nuts  Amount of soda consumption per day:  diet  Amount of water (in ounces) per day:  <60 oz  Drinking between meals only:  no  Toughest challenge:  exercise    Nutritional Goals  Limit carbohydrates to 100 gms per day, Eat 100-200 calories within 1 hour  of waking  and Eat 3-4 cups of fresh fruits or vegetables daily    Behavior Modifications Reviewed and Discussed  Eat breakfast, Eat 3 meals per day, Plan meals in advance, Read nutrition labels, Drink 64 oz of water per day, Maintain a daily food journal, No drinking 30 minutes before or after meals, Utlize portion control strategies to reduce calorie intake, Identify triggers for eating and manage cues and Eat slowly and take 20 to 30 minutes to complete each meal    Exercise Goals Reviewed and Discussed    Chair exercises for  30 Minutes    ROS:    Review of Systems   Constitutional: Negative.    Respiratory: Negative.     Cardiovascular: Negative.    Gastrointestinal: Negative.    Genitourinary: Negative.    Skin: Negative.    Neurological: Negative.    Psychiatric/Behavioral: Negative.         Physical Exam:   Physical Exam  Vitals reviewed.   Constitutional:       General: He is not in acute distress.     Appearance: He is well-developed.   HENT:      Head: Normocephalic and atraumatic.   Pulmonary:      Effort: Pulmonary effort is normal. No respiratory distress.   Abdominal:      Palpations: Abdomen is soft.   Musculoskeletal:         General: Normal range of motion.      Cervical back: Neck supple.   Skin:     General: Skin is warm and dry.   Neurological:      Mental Status: He is alert and oriented to person, place, and time.   Psychiatric:         Behavior: Behavior normal.         Thought Content: Thought content normal.         Judgment: Judgment normal.       ASSESSMENT     Encounter Diagnosis(ses):   Encounter Diagnoses   Name Primary?    Type 2 diabetes mellitus without complication, without long-term current use of insulin ([facility]) Yes    Dyslipidemia     Binge eating     Encounter for therapeutic drug monitoring     Morbid obesity with BMI of 40.0-44.9, adult (HCC)        PLAN   Patient is not interested in bariatric surgery. Patient desires to pursue traditional weight loss at this time.        HYPERTENSION: Blood pressure stable on the above medications. No interval change in antihypertensive medication;      DYSLIPIDEMIA: Stable on the above prescribed meal plan and medication. Liver function stable.        Lab Results  Component Value Date/Time   CHOLEST 167 08/07/2017 09:06 AM   LDL 99 08/07/2017 09:06 AM   HDL 39 08/07/2017 09:06 AM   TRIG 143 08/07/2017 09:06 AM       DIABETES: Continue current medications.       Patient was instructed to continue wearing their CPaP as recommended.      Increase water intake    Limit/avoid starchy carbs    Goals for next month:  1. Keep a food log using [employer]  2. Drink 48-64 ounces of water per day. No fruit juices or regular soda.  3. Increase aerobic exercises (goal is 150 minutes per week)  4. Increase fruit and vegetable servings/day  5. Keep carbs at or below 100g/day  6. Avoid skipping meals  7. Prepare meals and snacks in advance    Counseled Patient on comprehensive weight loss plan including attention to nutrition, exercise and behavior/stress management for in order to succeed and reach goals.    Reviewed the importance of quality sleep and stress management.     Ozempic caused heartburn  Mounjaro: tolerating well  Helping with diabetes    SLOW BURNER      PPI for GERD given by GI team     Topiramate: discontinue (recent kidney stone)  Hx of kidney stone        Sam Montiel MD

## 2025-04-25 NOTE — TELEPHONE ENCOUNTER
Called Pt no answer , left message notifying Pt of elevator not working . Instructed to call back our office if assistance is needed for appointment .

## 2025-04-29 NOTE — PATIENT INSTRUCTIONS
Prevention Guidelines, Men Ages 65 and Older  Screening tests and vaccines are an important part of managing your health.A screening test is done to find possible disorders or diseases in people who don't have any symptoms. The goal is to find a disease early so lifestyle changes can be made and you can be watched more closely to reduce the risk of disease, or to detect it early enough to treat it most effectively. Screening tests are not considered diagnostic, but are used to determine if more testing is needed.  Health counseling is essential, too. Below are guidelines for these, for men ages 65 and older. Talk with your healthcare provider to make sure you’re up-to-date on what you need.  Screening Who needs it How often   Abdominal aortic aneurysm Men ages 65 to 75 who have ever smoked 1 ultrasound   Alcohol misuse All men in this age group At routine exams   Blood pressure All men in this age group Yearly checkup if your blood pressure is normal  Normal blood pressure is less than 120/80 mm Hg  If your blood pressure reading is higher than normal, follow the advice of your healthcare provider   Colorectal cancer All men in this age group Flexible sigmoidoscopy every 5 years, or colonoscopy every 10 years, or double-contrast barium enema every 5 years; yearly fecal occult blood test or fecal immunochemical test; or a stool DNA test as often as your healthcare provider advises; talk with your healthcare provider about which tests are best for you and when you no longer need colonoscopies (generally after age 75)   Depression All men in this age group At routine exams   Type 2 diabetes or prediabetes All men beginning at age 45 and men without symptoms at any age who are overweight or obese and have 1 or more other risk factors for diabetes At least every 3 years (yearly if your blood sugar has already begun to rise)   Type 2 diabetes All men with prediabetes Every year   Hepatitis C Men at increased risk for  infection - talk with your healthcare provider At routine exams   High cholesterol or triglycerides All men in this age group At least every 5 years   HIV Men at increased risk for infection - talk with your healthcare provider At routine exams   Lung cancer Adults ages 55 to 80 who have smoked Yearly screening in smokers with 30 pack-year history of smoking or who quit within 15 years   Obesity All men in this age group At routine exams   Prostate cancer All men in this age group, talk to healthcare provider about risks and benefits of digital rectal exam (KAPIL) and prostate-specific antigen (PSA) screening1 At routine exams   Syphilis Men at increased risk for infection - talk with your healthcare provider At routine exams   Tuberculosis Men at increased risk for infection - talk with your healthcare provider Ask your healthcare provider   Vision All men in this age group Every 1 to 2 years; if you have a chronic health condition, ask your healthcare provider if you needs exams more often   Vaccine Who needs it How often   Chickenpox (varicella) All men in this age group who have no record of this infection or vaccine 2 doses; second dose should be given at least 4 weeks after the first dose   Hepatitis A Men at increased risk for infection - talk with your healthcare provider 2 doses given at least 6 months apart   Hepatitis B Men at increased risk for infection - talk with your healthcare provider 3 doses over 6 months; second dose should be given 1 month after the first dose; the third dose should be given at least 2 months after the second dose and at least 4 months after the first dose   Haemophilus influenzae Type B (HIB) Men at increased risk for infection - talk with your healthcare provider 1 to 3 doses   Influenza (flu) All men in this age group  Once a year   Meningococcal Men at increased risk for infection - talk with your healthcare provider 1 or more doses   Pneumococcal conjugate vaccine (PCV13) and  pneumococcal polysaccharide vaccine (PPSV23) All men in this age group 1 dose of each vaccine   Tetanus/diphtheria/  pertussis (Td/Tdap) booster All men in this age group Td every 10 years, or Tdap if you will have contact with a child younger than 12 months old   Zoster All men in this age group 1 dose   Counseling Who needs it How often   Diet and exercise Men who are overweight or obese When diagnosed, and then at routine exams   Fall prevention (exercise, vitamin D supplements) All men in this age group At routine exams   Sexually transmitted infection Men at increased risk for infection - talk with your healthcare provider At routine exams   Use of daily aspirin Men ages 45 to 79 at risk for cardiovascular health problems At routine exams   Use of tobacco and the health effects it can cause All men in this age group Every visit   [address] [place] [address] [facility]   Date Last Reviewed: 2/1/2017  © 5870-6623 [employer]. [address] [place] [id number]. All rights reserved. This information is not intended as a substitute for professional medical care. Always follow your healthcare professional's instructions.

## 2025-04-29 NOTE — PROGRESS NOTES
The following individual(s) verbally consented to be recorded using ambient AI listening technology and understand that they can each withdraw their consent to this listening technology at any point by asking the clinician to turn off or pause the recording:     Patient name: José Quintanillapetra  Additional names: Laura Saji

## 2025-04-29 NOTE — PROGRESS NOTES
Subjective:   José Lennon is a 70 year old male who presents for a Medicare Subsequent Annual Wellness visit (Pt already had Initial Annual Wellness) and scheduled follow up of multiple significant but stable problems.   History of Present Illness    Patient presents for above.  Here for his Medicare annual wellness exam.     History of high blood pressure on monotherapy.  Typically well controlled.  No chest pain or shortness of breath with activities although this is limited due to his arthritic conditions.  Compliant with his medication.  Has lost a significant amount of weight as he is on a GLP-1 medication.    History of hypercholesterolemia.  Currently taking Livalo.  Needs levels rechecked.    History of type 2 diabetes.  Most recent A1c was less than 6..  Currently on triple therapy.  Metformin dosage was reduced 6 months ago due to his A1c being normal.  He does see an ophthalmologist on a yearly basis.    History of obstructive sleep apnea on CPAP.  States he is not 100% compliant with his mask.  Does not offer good reason as to why this is the case.     Has arthritis and spinal stenosis.  Needs to follow-up with his orthopedic surgeon.  Does not want surgery.     History of morbid obesity.  Follows with a weight loss specialist   Has lost 18 pounds since his last visit.  Currently taking Mounjaro.     Had colonoscopy in 2023 with repeat to be done in 2033.  Urinates 0-1 times nightly.    History/Other:   Fall Risk Assessment:   He has been screened for Falls and is low risk.      Cognitive Assessment:   He had a completely normal cognitive assessment - see flowsheet entries     Functional Ability/Status:   José Lennon has some abnormal functions as listed below:  He has Hearing problems based on screening of functional status.      Depression Screening (PHQ):  PHQ-2 SCORE: 0  , done 4/29/2025     Advanced Directives:   He does have a Living Will but we do NOT have it on file in Epic.    He  does have a POA but we do NOT have it on file in Epic.    Discussed Advance Care Planning with patient (and family/surrogate if present). Standard forms made available to patient in After Visit Summary.      Patient Active Problem List   Diagnosis    Type 2 diabetes mellitus without complication, without long-term current use of insulin (HCC)    Senile cataract    Astigmatism with presbyopia    Osteoarthritis of knees, bilateral    Essential hypertension    Primary osteoarthritis of both knees    Primary osteoarthritis of right knee    Dyslipidemia    Degenerative disc disease, lumbar    Spinal stenosis of lumbar region at multiple levels    Low testosterone in male    Encounter for therapeutic drug monitoring    Binge eating    BRYCE (obstructive sleep apnea)    Facet arthropathy    Age-related nuclear cataract of both eyes    Morbid obesity with BMI of 40.0-44.9, adult (HCC)    Vitreous floaters of left eye    Pancreatic abnormality    Abnormal magnetic resonance cholangiopancreatography (MRCP)    Holbrook's esophagus with dysplasia     Allergies:  He is allergic to lamisil [terbinafine] and seasonal.    Current Medications:  Outpatient Medications Marked as Taking for the 4/29/25 encounter (Office Visit) with Raman Shah MD   Medication Sig    Tirzepatide (MOUNJARO) 10 MG/0.5ML Subcutaneous Solution Auto-injector Inject 10 mg into the skin once a week.    metFORMIN 500 MG Oral Tab Take 1 tablet (500 mg total) by mouth 2 (two) times daily with meals. (Patient taking differently: Take 1 tablet (500 mg total) by mouth in the morning.)    Glucose Blood (ONETOUCH VERIO) In Vitro Strip Test once daily as needed.    Empagliflozin (JARDIANCE) 25 MG Oral Tab Take 1 tablet by mouth daily.    metoprolol succinate ER 25 MG Oral Tablet 24 Hr Take 1 tablet (25 mg total) by mouth daily.    LORazepam 0.5 MG Oral Tab Take 1 tablet (0.5 mg total) by mouth every 30 (thirty) minutes as needed for Anxiety (anxiety prior to MRI).     Pitavastatin Calcium (LIVALO) 4 MG Oral Tab TAKE 1 TABLET BY MOUTH EVERY DAY IN THE EVENING    ergocalciferol 1.25 MG (31907 UT) Oral Cap Take 1 capsule (50,000 Units total) by mouth once a week.    OneTouch Delica Lancets 33G Does not apply Misc TEST TWICE DAILY AS NEEDED       Medical History:  He  has a past medical history of Arthritis (01/01/2017), Back problem, COPD (chronic obstructive pulmonary disease) ([facility]), Deep vein thrombosis (HCC) (2013), Diabetes ([facility]), DM2 (diabetes mellitus, type 2) ([facility]), Ear problems, High blood pressure, High cholesterol, Hyperlipidemia (2013), Morbid obesity with BMI of 45.0-49.9, adult ([facility]), Obesity, unspecified, Osteoarthritis, Pulmonary emphysema ([facility]) (MILD), and Sleep apnea.  Surgical History:  He  has a past surgical history that includes arthroscopy of joint unlisted (Left, 2013); arthroscopy of joint unlisted (Right); knee replacement surgery; colonoscopy; total knee replacement; colonoscopy (N/A, 11/29/2017); and colonoscopy (N/A, 2/28/2023).   Family History:  His family history includes Cancer in his mother; Diabetes in his brother; Heart Disease in his father; Hypertension in his mother; Lipids in an other family member.  Social History:  He  reports that he has been smoking cigarettes. He has never been exposed to tobacco smoke. He has quit using smokeless tobacco. He reports current alcohol use. He reports that he does not use drugs.    Tobacco:  Social History     Tobacco Use   Smoking Status Some Days    Current packs/day: 0.00    Types: Cigarettes    Passive exposure: Never   Smokeless Tobacco Former   Tobacco Comments    4 cigs daily     E-Cigarettes/Vaping       Questions Responses    E-Cigarette Use Never User    Passive Exposure No          E-Cigarette/Vaping Substances       Questions Responses    Nicotine No    THC No    CBD No    Flavoring No          E-Cigarette/Vaping Devices       Questions Responses    Disposable No    Pre-filled or Refillable  Cartridge No    Refillable Tank No    Pre-filled Pod No           Tobacco cessation counseling for 3-10 minutes (add E/M code #32523).      CAGE Alcohol Screen:   He has been screened for alcohol abuse and his score is not 0:  Cut: Have you ever felt you should Cut down on your drinking?: Yes  Annoyed: Have people Annoyed you by criticizing your drinking?: No  Guilty: Have you ever felt bad or Guilty about your drinking?: No  Eye Opener: Have you ever had a drink first thing in the morning to steady your nerves or to get rid of a hangover (Eye opener)?: No  Total Score: 1      Patient Care Team:  Raman Shah MD as PCP - General (Internal Medicine)  Johann Lopez MD as Consulting Physician (SURGERY, ORTHOPEDIC)  Rigo Rossi MD as Consulting Physician (GASTROENTEROLOGY)  Lyndsey Drummond MD as Consulting Physician (ENDOCRINOLOGY)  Sam Montiel MD as Consulting Physician (BARIATRICS)  Ned Aguilar MD (PULMONARY DISEASES)  Donal Shah MD (OPHTHALMOLOGY)    Review of Systems   Constitutional: Negative.    HENT: Negative.     Eyes: Negative.    Respiratory: Negative.     Cardiovascular: Negative.    Gastrointestinal: Negative.    Endocrine: Negative.    Genitourinary: Negative.    Musculoskeletal:  Positive for arthralgias.   Skin: Negative.    Allergic/Immunologic: Negative.    Neurological: Negative.    Hematological: Negative.    Psychiatric/Behavioral: Negative.       Objective:   Physical Exam  Constitutional:       Appearance: Normal appearance. He is well-developed.   HENT:      Head: Normocephalic.      Right Ear: Tympanic membrane, ear canal and external ear normal. There is no impacted cerumen.      Left Ear: Tympanic membrane, ear canal and external ear normal. There is no impacted cerumen.   Eyes:      General: No scleral icterus.     Pupils: Pupils are equal, round, and reactive to light.   Neck:      Vascular: No JVD.   Cardiovascular:      Rate and Rhythm: Normal rate and regular  rhythm.      Pulses: Normal pulses.      Heart sounds: Normal heart sounds. No murmur heard.  Pulmonary:      Effort: Pulmonary effort is normal. No respiratory distress.      Breath sounds: Normal breath sounds. No stridor. No wheezing, rhonchi or rales.   Chest:      Chest wall: No tenderness.   Abdominal:      General: Abdomen is flat. Bowel sounds are normal. There is no distension.      Palpations: Abdomen is soft.      Tenderness: There is no abdominal tenderness. There is no guarding or rebound.   Musculoskeletal:         General: Normal range of motion.      Cervical back: Normal range of motion.   Lymphadenopathy:      Cervical: No cervical adenopathy.   Skin:     General: Skin is warm.   Neurological:      General: No focal deficit present.      Mental Status: He is alert.      Cranial Nerves: No cranial nerve deficit.   Psychiatric:         Mood and Affect: Mood normal.         Behavior: Behavior normal.       /62 (BP Location: Right arm, Patient Position: Sitting, Cuff Size: large)   Pulse 90   Temp 97.9 °F (36.6 °C) (Temporal)   Resp 20   Ht 5' 8\" (1.727 m)   Wt 264 lb 12.8 oz (120.1 kg)   SpO2 96%   BMI 40.26 kg/m²  Estimated body mass index is 40.26 kg/m² as calculated from the following:    Height as of this encounter: 5' 8\" (1.727 m).    Weight as of this encounter: 264 lb 12.8 oz (120.1 kg).    Medicare Hearing Assessment:   Hearing Screening    Screening Method: Questionnaire  I have a problem hearing over the telephone: Yes I have trouble following the conversations when two or more people are talking at the same time: No   I have trouble understanding things on the TV: No I have to strain to understand conversations: Sometimes   I have to worry about missing the telephone ring or doorbell: Sometimes I have trouble hearing conversations in a noisy background such as a crowded room or restaurant: Sometimes   I get confused about where sounds come from: No I misunderstand some words in  a sentence and need to ask people to repeat themselves: Sometimes   I especially have trouble understanding the speech of women and children: No I have trouble understanding the speaker in a large room such as at a meeting or place of Congregation: Sometimes   Many people I talk to seem to mumble (or don't speak clearly): Sometimes People get annoyed because I misunderstand what they say: No   I misunderstand what others are saying and make inappropriate responses: Sometimes I avoid social activities because I cannot hear well and fear I will reply improperly: No   Family members and friends have told me they think I may have hearing loss: Sometimes                   Assessment & Plan:   José Lennon is a 70 year old male who presents for a Medicare Assessment.     1. Encounter for annual health examination (Primary)  -     CBC With Differential With Platelet; Future; Expected date: 04/29/2025  -     Comp Metabolic Panel (14); Future; Expected date: 04/29/2025  -     Lipid Panel; Future; Expected date: 04/29/2025  -     TSH W Reflex To Free T4; Future; Expected date: 04/29/2025  -     PSA Total, Diagnostic; Future; Expected date: 04/29/2025    2. Essential hypertension  -     Expanded, Low Complexity (34215)  -     Microalb/Creat Ratio, Random Urine; Future; Expected date: 04/29/2025  - Well controlled.    3. Dyslipidemia  -     Expanded, Low Complexity (15944)  -     Comp Metabolic Panel (14); Future; Expected date: 04/29/2025  -     Lipid Panel; Future; Expected date: 04/29/2025    4. Type 2 diabetes mellitus without complication, without long-term current use of insulin (HCC)  -     Expanded, Low Complexity (36842)  -     Microalb/Creat Ratio, Random Urine; Future; Expected date: 04/29/2025  -     Hemoglobin A1C; Future; Expected date: 04/29/2025  - Yearly eye exam.    5. Morbid obesity with BMI of 40.0-44.9, adult (HCC)  -     Expanded, Low Complexity (27543)  - Follow-up with weight loss specialist.  - Continue  Mounjaro.    6. Low testosterone in male  -     Expanded, Low Complexity (82288)  -     Testosterone, Total And Free; Future; Expected date: 04/29/2025    7. Spinal stenosis of lumbar region at multiple levels  -     Expanded, Low Complexity (67367)  - Stable.    8. Primary osteoarthritis of both knees  -     Expanded, Low Complexity (92121)  - Follow-up with orthopedic surgery.  - Last had injections approximately 3 months ago.    9. BRYCE (obstructive sleep apnea)  -     Expanded, Low Complexity (47755)  - Continue CPAP on a regular basis.    10. Colon cancer screening  - Repeat colonoscopy in 2033.  Assessment & Plan      The patient indicates understanding of these issues and agrees to the plan.  Reinforced healthy diet, lifestyle, and exercise.      Return in about 6 months (around 10/29/2025) for Routine Follow-Up.     Raman Shah MD, 4/29/2025     Supplementary Documentation:   General Health:  In the past six months, have you lost more than 10 pounds without trying?: 1 - Yes  Has your appetite been poor?: No  Type of Diet: Diabetic  How does the patient maintain a good energy level?: Other  How would you describe your daily physical activity?: Light  How would you describe your current health state?: Fair  How do you maintain positive mental well-being?: Visiting Family  On a scale of 0 to 10, with 0 being no pain and 10 being severe pain, what is your pain level?: 4 - (Moderate)  In the past six months, have you experienced urine leakage?: 0-No  At any time do you feel concerned for the safety/well-being of yourself and/or your children, in your home or elsewhere?: No  Have you had any immunizations at another office such as Influenza, Hepatitis B, Tetanus, or Pneumococcal?: No    Health Maintenance   Topic Date Due    Zoster Vaccines (2 of 2) 01/27/2020    Annual Depression Screening  01/01/2025    Fall Risk Screening (Annual)  01/01/2025    Diabetes Care: Foot Exam (Annual)  01/01/2025    Tobacco Cessation  Counseling  01/01/2025    Diabetes Care: Microalb/Creat Ratio (Annual)  01/01/2025    Annual Physical  03/27/2025    Diabetes Care: GFR  04/15/2025    Diabetes Care A1C  05/19/2025    COVID-19 Vaccine (6 - 2024-25 season) 05/29/2025 (Originally 4/11/2025)    Diabetes Care Dilated Eye Exam  10/15/2025    PSA  04/15/2026    Colorectal Cancer Screening  02/28/2033    Influenza Vaccine  Completed    Pneumococcal Vaccine: 50+ Years  Completed    Meningococcal B Vaccine  Aged Out

## 2025-07-03 NOTE — PROGRESS NOTES
[facility], [place], [place]  [address], [facility], [place] [address]  [facility] [address]  Dept: [phone]         Patient:  José Lennon  :      1955  MRN:      [id number]    Chief Complaint:    Chief Complaint   Patient presents with    Follow - Up    Weight Management       SUBJECTIVE     History of Present Illness:  Sumanth is being seen today for a follow-up for weight management    Past Medical History:   Past Medical History:    Arthritis    Back problem    strain and discomfort    COPD (chronic obstructive pulmonary disease) (HCC)    Deep vein thrombosis (HCC)    Diabetes (HCC)    DM2 (diabetes mellitus, type 2) (HCC)    Ear problems    High blood pressure    High cholesterol    Hyperlipidemia    Morbid obesity with BMI of 45.0-49.9, adult (HCC)    Obesity, unspecified    Osteoarthritis    Pulmonary emphysema (HCC)    Sleep apnea    does not tolerate machine        Comorbidities:  Diabetes-Improvement?  no, Hypertension-Improvement?  no and Hyperlipidemia-Improvement?  no    OBJECTIVE     Vitals: /66   Pulse 84   Ht 5' 8\" (1.727 m)   Wt 260 lb (117.9 kg)   SpO2 96%   BMI 39.53 kg/m²     Initial weight loss: -11   Total weight loss: -68   Start weight: 328    Wt Readings from Last 3 Encounters:   25 260 lb (117.9 kg)   25 264 lb 12.8 oz (120.1 kg)   25 271 lb (122.9 kg)         Patient Medications:    Current Outpatient Medications   Medication Sig Dispense Refill    Tirzepatide (MOUNJARO) 10 MG/0.5ML Subcutaneous Solution Auto-injector Inject 10 mg into the skin once a week. 2 mL 5    metFORMIN 500 MG Oral Tab Take 1 tablet (500 mg total) by mouth 2 (two) times daily with meals. (Patient taking differently: Take 1 tablet (500 mg total) by mouth in the morning.) 180 tablet 3    Glucose Blood (ONETOUCH VERIO) In Vitro Strip Test once daily as needed. 100 strip 3    Empagliflozin (JARDIANCE) 25 MG Oral Tab Take 1 tablet by mouth  daily. 90 tablet 3    metoprolol succinate ER 25 MG Oral Tablet 24 Hr Take 1 tablet (25 mg total) by mouth daily. 90 tablet 3    pantoprazole 20 MG Oral Tab EC Take 1 tablet (20 mg total) by mouth every morning before breakfast. 90 tablet 3    LORazepam 0.5 MG Oral Tab Take 1 tablet (0.5 mg total) by mouth every 30 (thirty) minutes as needed for Anxiety (anxiety prior to MRI). 2 tablet 0    pantoprazole 40 MG Oral Tab EC Take 1 tablet (40 mg total) by mouth before breakfast. 90 tablet 3    Pitavastatin Calcium (LIVALO) 4 MG Oral Tab TAKE 1 TABLET BY MOUTH EVERY DAY IN THE EVENING 90 tablet 3    ergocalciferol 1.25 MG (99151 UT) Oral Cap Take 1 capsule (50,000 Units total) by mouth once a week. 12 capsule 2    OneTouch Delica Lancets 33G Does not apply Misc TEST TWICE DAILY AS NEEDED 1 Box 3     Allergies:  Lamisil [terbinafine] and Seasonal     Social History:    Social History     Socioeconomic History    Marital status:      Spouse name: Not on file    Number of children: Not on file    Years of education: Not on file    Highest education level: Not on file   Occupational History    Not on file   Tobacco Use    Smoking status: Some Days     Current packs/day: 0.00     Types: Cigarettes     Passive exposure: Never    Smokeless tobacco: Former    Tobacco comments:     4 cigs daily   Vaping Use    Vaping status: Never Used   Substance and Sexual Activity    Alcohol use: Yes     Comment: Once in a while, drink    Drug use: Never    Sexual activity: Yes     Partners: Female   Other Topics Concern     Service Not Asked    Blood Transfusions Not Asked    Caffeine Concern Yes     Comment: Coffee, 2 cups daily;     Occupational Exposure Not Asked    Hobby Hazards Not Asked    Sleep Concern Not Asked    Stress Concern Not Asked    Weight Concern Not Asked    Special Diet Not Asked    Back Care Not Asked    Exercise Not Asked    Bike Helmet Not Asked    Seat Belt Not Asked    Self-Exams Not Asked   Social  History Narrative    Not on file     Social Drivers of Health     Food Insecurity: Not on file   Transportation Needs: No Transportation Needs (9/15/2023)    Transportation Needs     Lack of Transportation: No   Stress: Not on file   Housing Stability: Not on file     Surgical History:    Past Surgical History:   Procedure Laterality Date    Arthroscopy of joint unlisted Left 2013    Arthroscopy of joint unlisted Right     Colonoscopy      last colonoscopy 10 yrs ago    Colonoscopy N/A 11/29/2017    Procedure: COLONOSCOPY;  Surgeon: Rigo Rossi MD;  Location: [facility] ENDOSCOPY    Colonoscopy N/A 2/28/2023    Procedure: COLONOSCOPY;  Surgeon: Rigo Rossi MD;  Location: [facility] ENDOSCOPY    Knee replacement surgery      Total knee replacement      R Total Knee june 12, 2017     Family History:    Family History   Problem Relation Age of Onset    Cancer Mother         Lung cancer    Hypertension Mother     Heart Disease Father     Diabetes Brother     Lipids Other         Family H/o: Hyperlipidemia    Glaucoma Neg     Macular degeneration Neg        Food Journal  Reviewed and Discussed:       Patient has a Food Journal?: no   Patient is reading nutrition labels?  yes  Average Caloric Intake:     Average CHO Intake: >100  Is patient exercising? yes  Type of exercise? Has membership to [employer]    Eating Habits  Patient states the following:  Eats 3 meal(s) per day  Length of time it takes to consume a meal:  15 min  # of snacks per day: 1-2 Type of snacks:  nuts  Amount of soda consumption per day:  diet  Amount of water (in ounces) per day:  <60 oz  Drinking between meals only:  no  Toughest challenge:  exercise    Nutritional Goals  Limit carbohydrates to 100 gms per day, Eat 100-200 calories within 1 hour of waking  and Eat 3-4 cups of fresh fruits or vegetables daily    Behavior Modifications Reviewed and Discussed  Eat breakfast, Eat 3 meals per day, Plan meals in advance, Read nutrition labels, Drink  64 oz of water per day, Maintain a daily food journal, No drinking 30 minutes before or after meals, Utlize portion control strategies to reduce calorie intake, Identify triggers for eating and manage cues and Eat slowly and take 20 to 30 minutes to complete each meal    Exercise Goals Reviewed and Discussed    Chair exercises for  30 Minutes    ROS:    Review of Systems   Constitutional: Negative.    Respiratory: Negative.     Cardiovascular: Negative.    Gastrointestinal: Negative.    Genitourinary: Negative.    Skin: Negative.    Neurological: Negative.    Psychiatric/Behavioral: Negative.         Physical Exam:   Physical Exam  Vitals reviewed.   Constitutional:       General: He is not in acute distress.     Appearance: He is well-developed.   HENT:      Head: Normocephalic and atraumatic.   Pulmonary:      Effort: Pulmonary effort is normal. No respiratory distress.   Abdominal:      Palpations: Abdomen is soft.   Musculoskeletal:         General: Normal range of motion.      Cervical back: Neck supple.   Skin:     General: Skin is warm and dry.   Neurological:      Mental Status: He is alert and oriented to person, place, and time.   Psychiatric:         Behavior: Behavior normal.         Thought Content: Thought content normal.         Judgment: Judgment normal.       ASSESSMENT     Encounter Diagnosis(ses):   Encounter Diagnoses   Name Primary?    Type 2 diabetes mellitus without complication, without long-term current use of insulin (HCC) Yes    Dyslipidemia     Binge eating     Encounter for therapeutic drug monitoring     Essential hypertension     Obesity (BMI 30-39.9)        PLAN   Patient is not interested in bariatric surgery. Patient desires to pursue traditional weight loss at this time.       HYPERTENSION: Blood pressure stable on the above medications. No interval change in antihypertensive medication;      DYSLIPIDEMIA: Stable on the above prescribed meal plan and medication. Liver function  stable.        Lab Results  Component Value Date/Time   CHOLEST 167 08/07/2017 09:06 AM   LDL 99 08/07/2017 09:06 AM   HDL 39 08/07/2017 09:06 AM   TRIG 143 08/07/2017 09:06 AM       DIABETES: Continue current medications.       Patient was instructed to continue wearing their CPaP as recommended.      Increase water intake    Limit/avoid starchy carbs    Goals for next month:  1. Keep a food log using MFP  2. Drink 48-64 ounces of water per day. No fruit juices or regular soda.  3. Increase aerobic exercises (goal is 150 minutes per week)  4. Increase fruit and vegetable servings/day  5. Keep carbs at or below 100g/day  6. Avoid skipping meals  7. Prepare meals and snacks in advance    Counseled Patient on comprehensive weight loss plan including attention to nutrition, exercise and behavior/stress management for in order to succeed and reach goals.    Reviewed the importance of quality sleep and stress management.     Ozempic caused heartburn  Mounjaro: tolerating well  Increase dose to 12.5 mg  Helping with diabetes    SLOW BURNER    Needs to start working out    PPI for GERD given by GI team     Topiramate: discontinue (recent kidney stone)  Hx of kidney stone        Sam Montiel MD

## (undated) DEVICE — ADHESIVE MASTISOL 2/3CC VL

## (undated) DEVICE — COTTON ROLL: Brand: DEROYAL

## (undated) DEVICE — PSI PERSONA PS PIN GDE FEM-TIB

## (undated) DEVICE — SUTURE VICRYL 0 J340H

## (undated) DEVICE — SNARE OPTMZ PLPCTM TRP

## (undated) DEVICE — STERILE LATEX POWDER-FREE SURGICAL GLOVESWITH NITRILE COATING: Brand: PROTEXIS

## (undated) DEVICE — FAN SPRAY KIT: Brand: PULSAVAC®

## (undated) DEVICE — BATTERY

## (undated) DEVICE — MEDI-VAC NON-CONDUCTIVE SUCTION TUBING 6MM X 1.8M (6FT.) L: Brand: CARDINAL HEALTH

## (undated) DEVICE — PROXIMATE SKIN STAPLERS (35 WIDE) CONTAINS 35 STAINLESS STEEL STAPLES (FIXED HEAD): Brand: PROXIMATE

## (undated) DEVICE — DRESSING 10X4IN ANMC SAFETAC

## (undated) DEVICE — NEEDLE SPINAL 18X3-1/2 PINK.

## (undated) DEVICE — SOL  .9 1000ML BAG

## (undated) DEVICE — SUTURE VICRYL 2-0 FS-1

## (undated) DEVICE — KIT DRN 1/8IN PVC 3 SPRG EVAC

## (undated) DEVICE — Device: Brand: STABLECUT®

## (undated) DEVICE — ZIMMER® STERILE DISPOSABLE TOURNIQUET CUFF WITH PLC, DUAL PORT, SINGLE BLADDER, 42 IN. (107 CM)

## (undated) DEVICE — GAUZE SPONGES,12 PLY: Brand: CURITY

## (undated) DEVICE — SOL  .9 1000ML BTL

## (undated) DEVICE — 25MM TIBIAL FEMALE HEX

## (undated) DEVICE — 3M™ STERI-STRIP™ REINFORCED ADHESIVE SKIN CLOSURES, R1547, 1/2 IN X 4 IN (12 MM X 100 MM), 6 STRIPS/ENVELOPE: Brand: 3M™ STERI-STRIP™

## (undated) DEVICE — SOL H2O 1000ML BTL

## (undated) DEVICE — KIT ENDO ORCAPOD 160/180/190

## (undated) DEVICE — SUTURE ETHIBOND 1-0 CX30D

## (undated) DEVICE — SOL  .9 3000ML

## (undated) DEVICE — CHLORAPREP 26ML APPLICATOR

## (undated) DEVICE — Device

## (undated) DEVICE — CONTAINER SPEC STR 4OZ GRY LID

## (undated) DEVICE — POLAR CARE CUBE COOLING UNIT

## (undated) DEVICE — 48MM PIN

## (undated) DEVICE — T5 HOOD WITH PEEL AWAY FACE SHIELD

## (undated) DEVICE — Device: Brand: DUAL NARE NASAL CANNULAE FEMALE LUER CON 7FT O2 TUBE

## (undated) DEVICE — Device: Brand: POWER-FLO®

## (undated) DEVICE — BANDAGE FLXMSTR 11YDX6IN STRL

## (undated) DEVICE — SNARE CAPTIFLEX MICRO-OVL OLY

## (undated) DEVICE — COVER SGL STRL LGHT HNDL BLU

## (undated) DEVICE — KIT CLEAN ENDOKIT 1.1OZ GOWNX2

## (undated) DEVICE — GOWN SURG AERO BLUE PERF XLG

## (undated) DEVICE — TROCAR

## (undated) DEVICE — Device: Brand: DEFENDO AIR/WATER/SUCTION AND BIOPSY VALVE

## (undated) DEVICE — TOTAL KNEE: Brand: MEDLINE INDUSTRIES, INC.

## (undated) DEVICE — ENDOSCOPY PACK - LOWER: Brand: MEDLINE INDUSTRIES, INC.

## (undated) DEVICE — 60 ML SYRINGE REGULAR TIP: Brand: MONOJECT

## (undated) NOTE — LETTER
CAN DIAZ   • [address]  with Disabilities Certification for Parking Placard/License Plates  NOTE TO ALL DISABILITY LICENSE PLATE OWNERS:  If you have a disability license plate, you must execute this certification Eligibility Standards and Medical Professional Certification  As a licensed physician, advanced practice nurse, chiropractor, optometrist or physician’s assistant, I certify the individual named in Part 1 has a condition that constitutes him/her as a perso CLINIC, [place] [id number]  Dept: [phone]  Dept Fax: [phone]   Medical Professional’s [facility] Professional License Number*  [id number] Today’s Date*                  4/13/2021 cardiovascular or lung condition in which the degree of debilitation is so severe that it almost completely impedes the ability to walk. []    The patient is under 25years of age and incapable of driving.      Signature of Physician, Chiropractor, Advance following address. Permanent Disabled Parking Placard applications must be mailed to: , Persons with Disabilities License Plates/Placard Unit, [address], [facility], [address] [place] [address].      FOR  OFFICE USE ONLY

## (undated) NOTE — MR AVS SNAPSHOT
[place]  [address]  [phone]               Thank you for choosing us for your health care visit with [facility], OD.   We are glad to serve you and happy to provide you with this summary of Take 1 tablet (600 mg total) by mouth every 8 (eight) hours as needed for Pain.    Commonly known as:  MOTRIN           LIVALO 4 MG Tabs   Generic drug:  Pitavastatin Calcium   TAKE 1 TABLET BY MOUTH EVERY EVENING           MetFORMIN HCl 500 MG Tabs   TAKE

## (undated) NOTE — IP AVS SNAPSHOT
[address]  [address] [facility] ~ [phone]                Discharge Summary   6/12/2017    Alicia Hines           Admission Information        Provider Department    6/12/2017 Jj Coyle MD [facility] Commonly known as:  NEURONTIN   Next dose due: Tonight         Take 1 capsule (100 mg total) by mouth 2 (two) times daily.     Ricco Menjivar        9am             9pm             OxyCODONE HCl ER 10 MG T12a   Last time this was given:  10 mg on 6/14/2 STOP taking these medications     ibuprofen 600 MG Tabs   Commonly known as:  MOTRIN                Where to Get Your Medications      Please  your prescriptions at the location directed by your doctor or nurse     Bring a paper prescription for eac Recent Hematology Lab Results  (Last 3 results in the past 90 days)    WBC RBC Hemoglobin Hematocrit MCV MCH MCHC RDW Platelet MPV    (44/02/97)  12.4 (H) (06/14/17)  4.07 (L) (06/14/17)  12.9 (L) (06/14/17)  37.2 (L) (06/14/17)  91.4 -- -- -- (06/14/17) _____________________________________________________________________________    Medication Side Effects - Medications to be taken at home  As your caregivers, we want you to be aware of the medications you are prescribed to take and their potential SIDE E Narcotic Medications     hydrocodone-acetaminophen (NORCO) 7.5-325 MG Oral Tab    OxyCODONE HCl ER 10 MG Oral Tablet Extended Release 12 hour Abuse-Deterrent       Use:  Treat pain   Most common side effects: Constipation, drowsiness, dizziness, urinary re

## (undated) NOTE — LETTER
[address] [place] [address]  Authorization for Invasive Procedures  Date: ***           Time: ***    {[facility] ivs consent:99300}

## (undated) NOTE — LETTER
02/25/19        [address] [facility]  [address]      Dear Levy Pruitt records indicate that you have outstanding lab work and or testing that was ordered for you and has not yet been completed:  Orders Placed This Encounte

## (undated) NOTE — MR AVS SNAPSHOT
[address] Perez [address] [facility] [address] [address]  [phone]  [phone]               Thank you for choosing us for your health care visit with Jairon Beck PT.   We are glad to serve you and happy to provide you with

## (undated) NOTE — MR AVS SNAPSHOT
[facility] [place]  [address] [address] [phone]               Thank you for choosing us for your health care visit with Daisy Blandon MD.  We are glad to serve you and happy to provide you with this summary o Pitavastatin Calcium 4 MG Tabs   Take 1 tab by oral every evening   Commonly known as:  LIVALO                   MyChart                  Visit [facility] online at  [email or web address]

## (undated) NOTE — LETTER
Hospital Discharge Documentation  Please phone to schedule a hospital follow up appointment.     From: [address] Hospitalist's Office  Phone: [phone]    Patient discharged time/date: 6/14/2017  2:25 PM  Patient discharge disposition:  [address] Respiratory: Clear to auscultation bilaterally. No wheezes. No rhonchi. Cardiovascular: S1, S2. Regular rate and rhythm. No murmurs, rubs or gallops. Abdomen: Soft, nontender, nondistended. Positive bowel sounds. No rebound or guarding.   Neurologic: No Take 1 tablet (25 mg total) by mouth daily.     ONETOUCH LANCETS Does not apply Misc  Test twice daily prn              Discharge Diet: ADA Diabetic Diet     Discharge Activity: As tolerated       Discharge Medications      START taking these medications TAKE 1 TABLET (500 MG TOTAL) BY MOUTH 2 (TWO) TIMES DAILY WITH MEALS. Quantity:  180 tablet   Refills:  1       Metoprolol Succinate ER 25 MG Tb24   Last time this was given:  25 mg on 6/14/2017  7:56 AM   Commonly known as:   Toprol XL        Take 1 ta Take Norco every 4 hours as needed for breakthrough pain. Take Eliquis twice daily for blood clot prevention. Keep incision dry. Weightbear as tolerated with walker. Follow up with Dr. Kai Mata in 2 weeks [phone].           Time spent:  > 3

## (undated) NOTE — MR AVS SNAPSHOT
[facility] SPECIALTY \[facility] [place] [facility]\"" - [place] [address] [address]  [phone]               Thank you for choosing us for your health care visit with Peggy Gonzalez MD.  We are glad to serve you and happy to provide you with this summary o Take 1 tablet (600 mg total) by mouth every 8 (eight) hours as needed for Pain. Commonly known as:  MOTRIN           MetFORMIN HCl 500 MG Tabs   Take 1 tablet (500 mg total) by mouth 2 (two) times daily with meals.    Commonly known as:  GLUCOPHAGE Support Staff. Remember, MyChart is NOT to be used for urgent needs. For medical emergencies, dial 911.            Visit [facility] online at  [email or web address]

## (undated) NOTE — LETTER
9/29/2022    [address] [place] [address]            Dear Ricki Rueda,      Our records indicate that you are due for an appointment for a Colonoscopy with Shwetha Kapadia MD. Our doctors are booking out about 3-5 months for procedures. Please call our office to schedule this appointment. Your medical well-being is important to us. If your insurance requires a referral, please call your primary care office to request one.       Thank you,      The Physicians and Staff at [facility]

## (undated) NOTE — LETTER
July 5, 2023      No Recipients     Patient: Nolvia Csaey   YOB: 1955   Date of Visit: 7/5/2023       Dear Dr. Corrinne Maillard Recipients: Thank you for referring Nolvia Casey to me for evaluation. Here is my assessment and plan of care:    Nolvia Casey is a 80year old male. HPI:     HPI    Pt was last seen by [facility] 11/23/21. Pt has been a diabetic for 10 years       Pt's diabetes is currently controlled by pills and injection  Pt checks BS 1x a day  Pt's last blood sugar was 99 this morning  Last HA1C was 5.2 on 3/24/23  Endocrinologist: none    Pt denies any blurred vision at distance and is happy with his OTC reading glasses. Last edited by Samara Dai OT on 7/5/2023  2:57 PM.        Patient History:  Past Medical History:   Diagnosis Date    Arthritis 01/01/2017    Back problem     strain and discomfort    COPD (chronic obstructive pulmonary disease) ([address]     Deep vein thrombosis ([address] 2013    Diabetes ([address]     DM2 (diabetes mellitus, type 2) ([address]     Ear problems     High blood pressure     High cholesterol     Hyperlipidemia 2013    Morbid obesity with BMI of 45.0-49.9, adult ([address]     Obesity, unspecified     Osteoarthritis     Pulmonary emphysema ([address] MILD    Sleep apnea     does not tolerate machine       Surgical History: Nolvia Casey has a past surgical history that includes arthroscopy of joint unlisted (Left, 2013); arthroscopy of joint unlisted (Right); knee replacement surgery; colonoscopy (last colonoscopy 10 yrs ago); total knee replacement (R Total Knee june 12, 2017); colonoscopy (N/A, 11/29/2017) (Procedure: COLONOSCOPY;  Surgeon: Mary Anne Gonzales MD;  Location: [address] [place] [address] ENDOSCOPY); and colonoscopy (N/A, 2/28/2023) (Procedure: COLONOSCOPY;  Surgeon: Mary Anne Gonzales MD;  Location: [facility]).     Family History   Problem Relation Age of Onset    Cancer Mother         Lung cancer    Hypertension Mother     Heart Disease Father Diabetes Brother     Lipids Other         Family H/o: Hyperlipidemia    Glaucoma Neg     Macular degeneration Neg        Social History:   Social History     Socioeconomic History    Marital status:    Tobacco Use    Smoking status: Some Days     Packs/day: 0.00     Years: 30.00     Pack years: 0.00     Types: Cigarettes     Passive exposure: Never    Smokeless tobacco: Former   Substance and Sexual Activity    Alcohol use: Yes     Comment: Once in a while, drink    Drug use: Never   Other Topics Concern    Caffeine Concern Yes     Comment: Coffee, 2 cups daily; Medications:  Current Outpatient Medications   Medication Sig Dispense Refill    metFORMIN 500 MG Oral Tab Take 1 tablet (500 mg total) by mouth 2 (two) times daily with meals. 180 tablet 3    semaglutide (OZEMPIC, 1 MG/DOSE,) 2 MG/1.5ML Subcutaneous Solution Pen-injector Inject 1 mg into the skin every 7 days. 9 mL 1    Glucose Blood (ONETOUCH VERIO) In Vitro Strip Test once daily as needed. 100 strip 3    Empagliflozin (JARDIANCE) 25 MG Oral Tab Take 1 tablet by mouth daily. 90 tablet 3    ergocalciferol 1.25 MG (64000 UT) Oral Cap Take 1 capsule (50,000 Units total) by mouth once a week. 12 capsule 2    METOPROLOL SUCCINATE ER 25 MG Oral Tablet 24 Hr TAKE 1 TABLET BY MOUTH EVERY DAY 90 tablet 3    LIVALO 4 MG Oral Tab TAKE 1 TABLET BY MOUTH EVERY DAY IN THE EVENING 90 tablet 3    OneTouch Delica Lancets 62T Does not apply Misc TEST TWICE DAILY AS NEEDED 1 Box 3    Naproxen Sodium 220 MG Oral Tab Take 1-2 tablets (220-440 mg total) by mouth 2 (two) times daily. HYDROcodone-acetaminophen 5-325 MG Oral Tab Take 1-2 tablets by mouth every 8 (eight) hours as needed for Pain.  12 tablet 0       Allergies:    Lamisil [Terbinafin*    SWELLING    ROS:     ROS    Positive for: Endocrine, Eyes  Negative for: Constitutional, Gastrointestinal, Neurological, Skin, Genitourinary, Musculoskeletal, HENT, Cardiovascular, Respiratory, Psychiatric, Allergic/Imm, Heme/Lymph  Last edited by Niko Fonseca O.T. on 7/5/2023  2:10 PM.          PHYSICAL EXAM:     Base Eye Exam       Visual Acuity (Snellen - Linear)         Right Left    Dist sc 20/25 -2 20/30 -2    Near cc 20/20 20/20   +2.50 lens checked for near vision  - Pt prefers +2.75 reading glasses at near              Tonometry (Icare, 2:19 PM)         Right Left    Pressure 20 20              Pupils         Pupils    Right PERRL    Left PERRL              Visual Fields         Left Right     Full Full              Extraocular Movement         Right Left     Full, Ortho Full, Ortho              Dilation       Both eyes: 1.0% Mydriacyl and 2.5% Logan Synephrine @ 2:16 PM                  Slit Lamp and Fundus Exam       Slit Lamp Exam         Right Left    Lids/Lashes Dermatochalasis, Meibomian gland dysfunction Dermatochalasis, Meibomian gland dysfunction    Conjunctiva/Sclera Nasal/temp pinguecula Nasal/temp pinguecula    Cornea Clear Clear    Anterior Chamber Deep and quiet Deep and quiet    Iris Normal Normal    Lens 1+ Nuclear sclerosis, Trace Posterior subcapsular cataract 1+ Nuclear sclerosis, Trace Posterior subcapsular cataract    Vitreous Clear Livingston ring              Fundus Exam         Right Left    Disc Good rim, Temporal crescent Good rim, Temporal crescent    C/D Ratio 0.3 0.3    Macula Normal-no BDR Normal-no BDR    Vessels Normal Normal    Periphery Normal Normal                  Refraction       Wearing Rx         Sphere Cylinder    Right +2.50 Sphere    Left +2.50 Sphere      Type: OTC reading only              Manifest Refraction (Auto)         Sphere Cylinder Axis    Right -1.00 +0.50 015    Left -1.00 +1.00 165   Pt does not want distance glasses. ASSESSMENT/PLAN:     Diagnoses and Plan:     Type 2 diabetes mellitus without retinopathy ([address]  Diabetes type II: no background of retinopathy, no signs of neovascularization noted.   Discussed ocular and systemic benefits of blood sugar control. Diagnosis and treatment discussed in detail with patient. Will see patient in 1 year for a diabetic exam    Age-related nuclear cataract of both eyes  Discussed early cataracts with patient. No treatment recommended at this time. Vitreous floaters of left eye   There is no evidence of retinal pathology. All signs and symptoms of retinal detachment/tears explained in detail. Patient instructed to call the office if they experience increase in floaters, increase in flashes of light, loss of vision or curtain or veil effect. No orders of the defined types were placed in this encounter. Meds This Visit:  Requested Prescriptions      No prescriptions requested or ordered in this encounter        Follow up instructions:  Return in about 1 year (around 7/5/2024) for Diabetic eye exam.    7/5/2023  Scribed by: Lucho Meyers MD        If you have questions, please do not hesitate to call me. I look forward to following Sarah Nunez along with you.     Sincerely,        Lucho Meyers MD        CC:   No Recipients    Document electronically generated by: Lucho Meyers MD

## (undated) NOTE — LETTER
2/8/2019              [address] [place] [address]         Dear Genevieve Duckworth records indicate that the tests ordered for you by Vikki Weber MD  have not been done.   If you have, in fact, already complete

## (undated) NOTE — ED AVS SNAPSHOT
Kiran Clements   MRN: [id number]    Department:  [facility]   Date of Visit:  1/25/2019           Disclosure     Insurance plans vary and the physician(s) referred by the ER may not be covered by your plan.  Please c CARE PHYSICIAN AT ONCE OR RETURN IMMEDIATELY TO THE EMERGENCY DEPARTMENT. If you have been prescribed any medication(s), please fill your prescription right away and begin taking the medication(s) as directed.   If you believe that any of the medications

## (undated) NOTE — LETTER
[address]  Authorization for Invasive Procedure                                                                                           1. I hereby authorize Madelaine Escalante MD, my physician and his/her assistants (if applicable), which may include medical students, residents, and/or fellows, to perform the following surgical operation/ procedure and administer such anesthesia as may be determined necessary by my physician: Operation/Procedure name (s) COLONOSCOPY on [address] [facility]   2. I recognize that during the surgical operation/procedure, unforeseen conditions may necessitate additional or different procedures than those listed above. I, therefore, further authorize and request that the above-named surgeon, assistants, or designees perform such procedures as are, in their judgment, necessary and desirable. 3.   My surgeon/physician has discussed prior to my surgery the potential benefits, risks and side effects of this procedure; the likelihood of achieving goals; and potential problems that might occur during recuperation. They also discussed reasonable alternatives to the procedure, including risks, benefits, and side effects related to the alternatives and risks related to not receiving this procedure. I have had all my questions answered and I acknowledge that no guarantee has been made as to the result that may be obtained. 4.   Should the need arise during my operation/procedure, which includes change of level of care prior to discharge, I also consent to the administration of blood and/or blood products. Further, I understand that despite careful testing and screening of blood or blood products by collecting agencies, I may still be subject to ill effects as a result of receiving a blood transfusion and/or blood products.   The following are some, but not all, of the potential risks that can occur: fever and allergic reactions, hemolytic reactions, transmission of diseases such as Hepatitis, AIDS and Cytomegalovirus (CMV) and fluid overload. In the event that I wish to have an autologous transfusion of my own blood, or a directed donor transfusion, I will discuss this with my physician. Check only if Refusing Blood or Blood Products  I understand refusal of blood or blood products as deemed necessary by my physician may have serious consequences to my condition to include possible death. I hereby assume responsibility for my refusal and release the hospital, its personnel, and my physicians from any responsibility for the consequences of my refusal.    o  Refuse   5. I authorize the use of any specimen, organs, tissues, body parts or foreign objects that may be removed from my body during the operation/procedure for diagnosis, research or teaching purposes and their subsequent disposal by hospital authorities. I also authorize the release of specimen test results and/or written reports to my treating physician on the hospital medical staff or other referring or consulting physicians involved in my care, at the discretion of the Pathologist or my treating physician. 6.   I consent to the photographing or videotaping of the operations or procedures to be performed, including appropriate portions of my body for medical, scientific, or educational purposes, provided my identity is not revealed by the pictures or by descriptive texts accompanying them. If the procedure has been photographed/videotaped, the surgeon will obtain the original picture, image, videotape or CD. The hospital will not be responsible for storage, release or maintenance of the picture, image, tape or CD.    7.   I consent to the presence of a  or observers in the operating room as deemed necessary by my physician or their designees.     8.   I recognize that in the event my procedure results in extended X-Ray/fluoroscopy time, I may develop a skin reaction. 9. If I have a Do Not Attempt Resuscitation (DNAR) order in place, that status will be suspended while in the operating room, procedural suite, and during the recovery period unless otherwise explicitly stated by me (or a person authorized to consent on my behalf). The surgeon or my attending physician will determine when the applicable recovery period ends for purposes of reinstating the DNAR order. 10. Patients having a sterilization procedure: I understand that if the procedure is successful the results will be permanent and it will therefore be impossible for me to inseminate, conceive, or bear children. I also understand that the procedure is intended to result in sterility, although the result has not been guaranteed. 11. I acknowledge that my physician has explained sedation/analgesia administration to me including the risk and benefits I consent to the administration of sedation/analgesia as may be necessary or desirable in the judgment of my physician. I CERTIFY THAT I HAVE READ AND FULLY UNDERSTAND THE ABOVE CONSENT TO OPERATION and/or OTHER PROCEDURE.     _________________________________________ _________________________________     ___________________________________  Signature of Patient     Signature of Responsible Person                   Printed Name of Responsible Person                              _________________________________________ ______________________________        ___________________________________  Signature of Witness         Date  Time         Relationship to Patient    STATEMENT OF PHYSICIAN My signature below affirms that prior to the time of the procedure; I have explained to the patient and/or his/her legal representative, the risks and benefits involved in the proposed treatment and any reasonable alternative to the proposed treatment.  I have also explained the risks and benefits involved in refusal of the proposed treatment and alternatives to the proposed treatment and have answered the patient's questions.  If I have a significant financial interest in a co-management agreement or a significant financial interest in any product or implant, or other significant relationship used in this procedure/surgery, I have disclosed this and had a discussion with my patient.     _______________________________________________________________ _____________________________  Kimberly Blanchard of Physician)                                                                                         (Date)                                   (Time)  Patient Name: Cathy Vogel    : 1955   Printed: 2023      Medical Record #: [id number]                                              Page 1 of 1

## (undated) NOTE — Clinical Note
Thank you for the consult. I saw Mr. Lennon in the endocrine/diabetes clinic today. Please see attached my note. Please feel free to contact me with any questions. Thanks!

## (undated) NOTE — MR AVS SNAPSHOT
[facility] [place] [address] [id number] [phone]               Thank you for choosing us for your health care visit with Giselle Brown MD.  We are glad to serve you and happy to provide you with this summary o ONETOUCH LANCETS Misc   Test twice daily prn           Phentermine HCl 15 MG Caps   Take 1 capsule (15 mg total) by mouth every morning.            Pitavastatin Calcium 4 MG Tabs   Take 1 tab by oral every evening   Commonly known as:  LIVALO Instructions:   To schedule a test at any [facility], call Central Scheduling at [phone], Monday through Friday between 7:30am to 6pm and on Saturday between 8am and 1pm. Evening and weekend appointments for your exam are a insurance company's guidelines for prior authorization for this test.  You may be held responsible for payment in full if proper authorization is not acquired.   Please contact the Patient Business Office at [phone] if you have any questions related to

## (undated) NOTE — LETTER
[address]  Authorization for Surgical Operation and Procedure                                                                                           1. I hereby authorize Conrad Edward MD, my physician and his/her assistants (if applicable), which may include medical students, residents, and/or fellows, to perform the following surgical operation/ procedure and administer such anesthesia as may be determined necessary by my physician: Operation/Procedure name (s) COLONOSCOPY on [facility] Code [facility]   2. I recognize that during the surgical operation/procedure, unforeseen conditions may necessitate additional or different procedures than those listed above. I, therefore, further authorize and request that the above-named surgeon, assistants, or designees perform such procedures as are, in their judgment, necessary and desirable. 3.   My surgeon/physician has discussed prior to my surgery the potential benefits, risks and side effects of this procedure; the likelihood of achieving goals; and potential problems that might occur during recuperation. They also discussed reasonable alternatives to the procedure, including risks, benefits, and side effects related to the alternatives and risks related to not receiving this procedure. I have had all my questions answered and I acknowledge that no guarantee has been made as to the result that may be obtained. 4.   Should the need arise during my operation/procedure, which includes change of level of care prior to discharge, I also consent to the administration of blood and/or blood products. Further, I understand that despite careful testing and screening of blood or blood products by collecting agencies, I may still be subject to ill effects as a result of receiving a blood transfusion and/or blood products.   The following are some, but not all, of the potential risks that can occur: fever and allergic reactions, hemolytic reactions, transmission of diseases such as Hepatitis, AIDS and Cytomegalovirus (CMV) and fluid overload. In the event that I wish to have an autologous transfusion of my own blood, or a directed donor transfusion, I will discuss this with my physician. Check only if Refusing Blood or Blood Products  I understand refusal of blood or blood products as deemed necessary by my physician may have serious consequences to my condition to include possible death. I hereby assume responsibility for my refusal and release the hospital, its personnel, and my physicians from any responsibility for the consequences of my refusal.    o  Refuse   5. I authorize the use of any specimen, organs, tissues, body parts or foreign objects that may be removed from my body during the operation/procedure for diagnosis, research or teaching purposes and their subsequent disposal by hospital authorities. I also authorize the release of specimen test results and/or written reports to my treating physician on the hospital medical staff or other referring or consulting physicians involved in my care, at the discretion of the Pathologist or my treating physician. 6.   I consent to the photographing or videotaping of the operations or procedures to be performed, including appropriate portions of my body for medical, scientific, or educational purposes, provided my identity is not revealed by the pictures or by descriptive texts accompanying them. If the procedure has been photographed/videotaped, the surgeon will obtain the original picture, image, videotape or CD. The hospital will not be responsible for storage, release or maintenance of the picture, image, tape or CD.    7.   I consent to the presence of a  or observers in the operating room as deemed necessary by my physician or their designees.     8.   I recognize that in the event my procedure results in extended X-Ray/fluoroscopy time, I may develop a skin reaction. 9. If I have a Do Not Attempt Resuscitation (DNAR) order in place, that status will be suspended while in the operating room, procedural suite, and during the recovery period unless otherwise explicitly stated by me (or a person authorized to consent on my behalf). The surgeon or my attending physician will determine when the applicable recovery period ends for purposes of reinstating the DNAR order. 10. Patients having a sterilization procedure: I understand that if the procedure is successful the results will be permanent and it will therefore be impossible for me to inseminate, conceive, or bear children. I also understand that the procedure is intended to result in sterility, although the result has not been guaranteed. 11. I acknowledge that my physician has explained sedation/analgesia administration to me including the risk and benefits I consent to the administration of sedation/analgesia as may be necessary or desirable in the judgment of my physician. I CERTIFY THAT I HAVE READ AND FULLY UNDERSTAND THE ABOVE CONSENT TO OPERATION and/or OTHER PROCEDURE.     _________________________________________ _________________________________     ___________________________________  Signature of Patient     Signature of Responsible Person                   Printed Name of Responsible Person                              _________________________________________ ______________________________        ___________________________________  Signature of Witness         Date  Time         Relationship to Patient    STATEMENT OF PHYSICIAN My signature below affirms that prior to the time of the procedure; I have explained to the patient and/or his/her legal representative, the risks and benefits involved in the proposed treatment and any reasonable alternative to the proposed treatment.  I have also explained the risks and benefits involved in refusal of the proposed treatment and alternatives to the proposed treatment and have answered the patient's questions.  If I have a significant financial interest in a co-management agreement or a significant financial interest in any product or implant, or other significant relationship used in this procedure/surgery, I have disclosed this and had a discussion with my patient.     _______________________________________________________________ _____________________________  Wil Fajardo  )                                                                                         (Date)                                   (Time)  Patient Name: Awilda Iglesias    : 1955   Printed: 2023      Medical Record #: [id number]                                              Page 1 of 1

## (undated) NOTE — LETTER
[address], [place]  Authorization for Invasive Procedures  Date: 9/12/2023           Time: 1527    I hereby authorize Dr. Kwadwo Paula, my physician and his/her assistants (if applicable), which may include medical students, residents, and/or fellows, to perform the following surgical operation/ procedure and administer such anesthesia as may be determined necessary by my physician: Esophagogastroduodenoscopy and endoscopic ultrasound on [facility]  2. I recognize that during the surgical operation/procedure, unforeseen conditions may necessitate additional or different procedures than those listed above. I, therefore, further authorize and request that the above-named surgeon, assistants, or designees perform such procedures as are, in their judgment, necessary and desirable. 3.   My surgeon/physician has discussed prior to my surgery the potential benefits, risks and side effects of this procedure; the likelihood of achieving goals; and potential problems that might occur during recuperation. They also discussed reasonable alternatives to the procedure, including risks, benefits, and side effects related to the alternatives and risks related to not receiving this procedure. I have had all my questions answered and I acknowledge that no guarantee has been made as to the result that may be obtained. 4.   Should the need arise during my operation/procedure, which includes change of level of care prior to discharge, I also consent to the administration of blood and/or blood products. Further, I understand that despite careful testing and screening of blood or blood products by collecting agencies, I may still be subject to ill effects as a result of receiving a blood transfusion and/or blood products.   The following are some, but not all, of the potential risks that can occur: fever and allergic reactions, hemolytic reactions, transmission of diseases such as Hepatitis, AIDS and Cytomegalovirus (CMV) and fluid overload. In the event that I wish to have an autologous transfusion of my own blood, or a directed donor transfusion, I will discuss this with my physician. Check only if Refusing Blood or Blood Products  I understand refusal of blood or blood products as deemed necessary by my physician may have serious consequences to my condition to include possible death. I hereby assume responsibility for my refusal and release the hospital, its personnel, and my physicians from any responsibility for the consequences of my refusal.         o  Refuse         5. I authorize the use of any specimen, organs, tissues, body parts or foreign objects that may be removed from my body during the operation/procedure for diagnosis, research or teaching purposes and their subsequent disposal by hospital authorities. I also authorize the release of specimen test results and/or written reports to my treating physician on the hospital medical staff or other referring or consulting physicians involved in my care, at the discretion of the Pathologist or my treating physician. 6.   I consent to the photographing or videotaping of the operations or procedures to be performed, including appropriate portions of my body for medical, scientific, or educational purposes, provided my identity is not revealed by the pictures or by descriptive texts accompanying them. If the procedure has been photographed/videotaped, the surgeon will obtain the original picture, image, videotape or CD. The hospital will not be responsible for storage, release or maintenance of the picture, image, tape or CD.    7.   I consent to the presence of a  or observers in the operating room as deemed necessary by my physician or their designees. 8.   I recognize that in the event my procedure results in extended X-Ray/fluoroscopy time, I may develop a skin reaction. 9.  If I have a Do Not Attempt Resuscitation (DNAR) order in place, that status will be suspended while in the operating room, procedural suite, and during the recovery period unless otherwise explicitly stated by me (or a person authorized to consent on my behalf). The surgeon or my attending physician will determine when the applicable recovery period ends for purposes of reinstating the DNAR order. 10. Patients having a sterilization procedure: I understand that if the procedure is successful the results will be permanent and it will therefore be impossible for me to inseminate, conceive, or bear children. I also understand that the procedure is intended to result in sterility, although the result has not been guaranteed. 11. I acknowledge that my physician has explained sedation/analgesia administration to me including the risk and benefits I consent to the administration of sedation/analgesia as may be necessary or desirable in the judgment of my physician.     I CERTIFY THAT I HAVE READ AND FULLY UNDERSTAND THE ABOVE CONSENT TO OPERATION and/or OTHER PROCEDURE.        ____________________________________       _________________________________      ______________________________  Signature of Patient         Signature of Responsible Person        Printed Name of Responsible Person        ____________________________________      _________________________________      ______________________________       Signature of Witness          Relationship to Patient                       Date                                       Time    Patient Name: Neida Vernon     : 1955                 Printed: 2023      Medical Record #: [id number]                      Page 1 of 2          [place] My signature below affirms that prior to the time of the procedure; I have explained to the patient and/or his/her legal representative, the risks and benefits involved in the proposed treatment and any reasonable alternative to the proposed treatment. I have also explained the risks and benefits involved in refusal of the proposed treatment and alternatives to the proposed treatment and have answered the patient's questions.  If I have a significant financial interest in a co-management agreement or a significant financial interest in any product or implant, or other significant relationship used in this procedure/surgery, I have disclosed this and had a discussion with my patient.     _______________________________________________________________ _____________________________  Lesli Dear of Physician)                                                                                         (Date)                                   (Time)    Patient Name: Shahid Amaro     : 1955                 Printed: 2023      Medical Record #: [id number]                      Page 2 of 2

## (undated) NOTE — Clinical Note
I gave him a sample of Ozempic  Might help with both sugars and appetite    Thanks  Alicia Birmingham

## (undated) NOTE — LETTER
01/25/22            [address] [address]         Dear Farshad Holder records indicate that the tests ordered for you by Minesh Bradley MD  have not been done.   If you have, in fact, already completed